# Patient Record
Sex: MALE | Race: WHITE | Employment: FULL TIME | URBAN - NONMETROPOLITAN AREA
[De-identification: names, ages, dates, MRNs, and addresses within clinical notes are randomized per-mention and may not be internally consistent; named-entity substitution may affect disease eponyms.]

---

## 2017-01-05 ENCOUNTER — TELEPHONE (OUTPATIENT)
Dept: PODIATRY | Age: 57
End: 2017-01-05

## 2017-01-06 ENCOUNTER — OFFICE VISIT (OUTPATIENT)
Dept: FAMILY MEDICINE CLINIC | Age: 57
End: 2017-01-06

## 2017-01-06 VITALS
OXYGEN SATURATION: 94 % | SYSTOLIC BLOOD PRESSURE: 110 MMHG | WEIGHT: 267 LBS | DIASTOLIC BLOOD PRESSURE: 60 MMHG | HEART RATE: 77 BPM | BODY MASS INDEX: 37.24 KG/M2

## 2017-01-06 DIAGNOSIS — D58.2 ELEVATED HEMOGLOBIN (HCC): Primary | ICD-10-CM

## 2017-01-06 DIAGNOSIS — I10 ESSENTIAL HYPERTENSION: ICD-10-CM

## 2017-01-06 DIAGNOSIS — E34.9 HYPOTESTOSTERONISM: ICD-10-CM

## 2017-01-06 DIAGNOSIS — G47.33 OSA (OBSTRUCTIVE SLEEP APNEA): ICD-10-CM

## 2017-01-06 PROCEDURE — 99214 OFFICE O/P EST MOD 30 MIN: CPT | Performed by: FAMILY MEDICINE

## 2017-01-06 RX ORDER — TESTOSTERONE GEL, 1% 10 MG/G
GEL TRANSDERMAL
Qty: 9 G | Refills: 3 | Status: SHIPPED | OUTPATIENT
Start: 2017-01-06 | End: 2017-03-30 | Stop reason: SDUPTHER

## 2017-01-09 ENCOUNTER — PROCEDURE VISIT (OUTPATIENT)
Dept: PODIATRY | Age: 57
End: 2017-01-09

## 2017-01-09 VITALS
WEIGHT: 272.2 LBS | BODY MASS INDEX: 38.11 KG/M2 | HEIGHT: 71 IN | SYSTOLIC BLOOD PRESSURE: 118 MMHG | DIASTOLIC BLOOD PRESSURE: 78 MMHG | HEART RATE: 70 BPM

## 2017-01-09 DIAGNOSIS — M20.61 DEFORMITY, TOE ACQUIRED, RIGHT: ICD-10-CM

## 2017-01-09 DIAGNOSIS — L84 CORNS AND CALLOSITIES: Primary | ICD-10-CM

## 2017-01-09 DIAGNOSIS — M79.674 PAIN IN TOE OF RIGHT FOOT: ICD-10-CM

## 2017-01-09 PROCEDURE — 99213 OFFICE O/P EST LOW 20 MIN: CPT | Performed by: PODIATRIST

## 2017-01-09 PROCEDURE — 11055 PARING/CUTG B9 HYPRKER LES 1: CPT | Performed by: PODIATRIST

## 2017-02-08 ENCOUNTER — OFFICE VISIT (OUTPATIENT)
Dept: PODIATRY | Age: 57
End: 2017-02-08

## 2017-02-08 VITALS
HEIGHT: 71 IN | BODY MASS INDEX: 38.64 KG/M2 | SYSTOLIC BLOOD PRESSURE: 132 MMHG | DIASTOLIC BLOOD PRESSURE: 74 MMHG | WEIGHT: 276 LBS | HEART RATE: 74 BPM

## 2017-02-08 DIAGNOSIS — B35.1 DERMATOPHYTOSIS OF NAIL: ICD-10-CM

## 2017-02-08 DIAGNOSIS — L84 CORNS AND CALLOSITIES: ICD-10-CM

## 2017-02-08 DIAGNOSIS — M20.41 HAMMER TOE OF RIGHT FOOT: Primary | ICD-10-CM

## 2017-02-08 PROCEDURE — 99213 OFFICE O/P EST LOW 20 MIN: CPT | Performed by: PODIATRIST

## 2017-02-23 DIAGNOSIS — M54.12 CERVICAL RADICULOPATHY: Primary | ICD-10-CM

## 2017-02-23 RX ORDER — GABAPENTIN 100 MG/1
CAPSULE ORAL
Qty: 270 CAPSULE | Refills: 1 | Status: SHIPPED | OUTPATIENT
Start: 2017-02-23 | End: 2018-02-02 | Stop reason: SINTOL

## 2017-02-28 ENCOUNTER — EVALUATION (OUTPATIENT)
Dept: PHYSICAL THERAPY | Age: 57
End: 2017-02-28

## 2017-02-28 DIAGNOSIS — G95.9 CERVICAL MYELOPATHY (HCC): Primary | ICD-10-CM

## 2017-02-28 PROCEDURE — 97110 THERAPEUTIC EXERCISES: CPT | Performed by: PHYSICAL THERAPIST

## 2017-02-28 PROCEDURE — 97014 ELECTRIC STIMULATION THERAPY: CPT | Performed by: PHYSICAL THERAPIST

## 2017-02-28 PROCEDURE — 97162 PT EVAL MOD COMPLEX 30 MIN: CPT | Performed by: PHYSICAL THERAPIST

## 2017-02-28 ASSESSMENT — PAIN DESCRIPTION - LOCATION: LOCATION: NECK;SHOULDER

## 2017-02-28 ASSESSMENT — PAIN DESCRIPTION - DESCRIPTORS: DESCRIPTORS: DULL;ACHING

## 2017-02-28 ASSESSMENT — PAIN DESCRIPTION - PROGRESSION: CLINICAL_PROGRESSION: GRADUALLY IMPROVING

## 2017-02-28 ASSESSMENT — PAIN DESCRIPTION - ORIENTATION: ORIENTATION: LEFT;RIGHT

## 2017-02-28 ASSESSMENT — PAIN DESCRIPTION - PAIN TYPE: TYPE: CHRONIC PAIN

## 2017-02-28 ASSESSMENT — PAIN DESCRIPTION - FREQUENCY: FREQUENCY: INTERMITTENT

## 2017-02-28 ASSESSMENT — PAIN SCALES - GENERAL: PAINLEVEL_OUTOF10: 3

## 2017-03-02 ENCOUNTER — TREATMENT (OUTPATIENT)
Dept: PHYSICAL THERAPY | Age: 57
End: 2017-03-02

## 2017-03-02 DIAGNOSIS — G95.9 CERVICAL MYELOPATHY (HCC): Primary | ICD-10-CM

## 2017-03-02 PROCEDURE — 97014 ELECTRIC STIMULATION THERAPY: CPT | Performed by: PHYSICAL THERAPIST

## 2017-03-02 PROCEDURE — 97012 MECHANICAL TRACTION THERAPY: CPT | Performed by: PHYSICAL THERAPIST

## 2017-03-07 ENCOUNTER — TREATMENT (OUTPATIENT)
Dept: PHYSICAL THERAPY | Age: 57
End: 2017-03-07

## 2017-03-07 DIAGNOSIS — G95.9 CERVICAL MYELOPATHY (HCC): Primary | ICD-10-CM

## 2017-03-07 PROCEDURE — 97110 THERAPEUTIC EXERCISES: CPT | Performed by: PHYSICAL THERAPIST

## 2017-03-07 PROCEDURE — 97012 MECHANICAL TRACTION THERAPY: CPT | Performed by: PHYSICAL THERAPIST

## 2017-03-07 PROCEDURE — 97014 ELECTRIC STIMULATION THERAPY: CPT | Performed by: PHYSICAL THERAPIST

## 2017-03-09 ENCOUNTER — TREATMENT (OUTPATIENT)
Dept: PHYSICAL THERAPY | Age: 57
End: 2017-03-09

## 2017-03-09 DIAGNOSIS — G95.9 CERVICAL MYELOPATHY (HCC): Primary | ICD-10-CM

## 2017-03-09 PROCEDURE — 97012 MECHANICAL TRACTION THERAPY: CPT | Performed by: PHYSICAL THERAPIST

## 2017-03-09 PROCEDURE — 97014 ELECTRIC STIMULATION THERAPY: CPT | Performed by: PHYSICAL THERAPIST

## 2017-03-09 PROCEDURE — 97110 THERAPEUTIC EXERCISES: CPT | Performed by: PHYSICAL THERAPIST

## 2017-03-14 ENCOUNTER — TREATMENT (OUTPATIENT)
Dept: PHYSICAL THERAPY | Age: 57
End: 2017-03-14

## 2017-03-14 DIAGNOSIS — G95.9 CERVICAL MYELOPATHY (HCC): Primary | ICD-10-CM

## 2017-03-16 ENCOUNTER — TREATMENT (OUTPATIENT)
Dept: PHYSICAL THERAPY | Age: 57
End: 2017-03-16
Payer: COMMERCIAL

## 2017-03-16 DIAGNOSIS — G95.9 CERVICAL MYELOPATHY (HCC): Primary | ICD-10-CM

## 2017-03-16 PROCEDURE — 97110 THERAPEUTIC EXERCISES: CPT | Performed by: PHYSICAL THERAPIST

## 2017-03-16 PROCEDURE — 97012 MECHANICAL TRACTION THERAPY: CPT | Performed by: PHYSICAL THERAPIST

## 2017-03-21 ENCOUNTER — TREATMENT (OUTPATIENT)
Dept: PHYSICAL THERAPY | Age: 57
End: 2017-03-21
Payer: COMMERCIAL

## 2017-03-21 DIAGNOSIS — G95.9 CERVICAL MYELOPATHY (HCC): Primary | ICD-10-CM

## 2017-03-21 PROCEDURE — 97110 THERAPEUTIC EXERCISES: CPT | Performed by: PHYSICAL THERAPIST

## 2017-03-30 DIAGNOSIS — E34.9 HYPOTESTOSTERONISM: ICD-10-CM

## 2017-03-31 ENCOUNTER — OFFICE VISIT (OUTPATIENT)
Dept: FAMILY MEDICINE CLINIC | Age: 57
End: 2017-03-31
Payer: COMMERCIAL

## 2017-03-31 VITALS
SYSTOLIC BLOOD PRESSURE: 136 MMHG | RESPIRATION RATE: 16 BRPM | DIASTOLIC BLOOD PRESSURE: 84 MMHG | OXYGEN SATURATION: 94 % | BODY MASS INDEX: 38.08 KG/M2 | HEART RATE: 74 BPM | HEIGHT: 71 IN | WEIGHT: 272 LBS

## 2017-03-31 DIAGNOSIS — H91.93 HEARING DECREASED, BILATERAL: Primary | ICD-10-CM

## 2017-03-31 DIAGNOSIS — G47.33 OSA (OBSTRUCTIVE SLEEP APNEA): ICD-10-CM

## 2017-03-31 PROCEDURE — 99214 OFFICE O/P EST MOD 30 MIN: CPT | Performed by: NURSE PRACTITIONER

## 2017-03-31 RX ORDER — TESTOSTERONE GEL, 1% 10 MG/G
GEL TRANSDERMAL
Qty: 9 G | Refills: 1 | Status: SHIPPED | OUTPATIENT
Start: 2017-03-31 | End: 2017-10-20 | Stop reason: SDUPTHER

## 2017-03-31 ASSESSMENT — ENCOUNTER SYMPTOMS
VOMITING: 0
ALLERGIC/IMMUNOLOGIC NEGATIVE: 1
DIARRHEA: 0
RESPIRATORY NEGATIVE: 1
ABDOMINAL PAIN: 0
SHORTNESS OF BREATH: 0
EYES NEGATIVE: 1
NAUSEA: 0
SINUS PRESSURE: 0
CHEST TIGHTNESS: 0
GASTROINTESTINAL NEGATIVE: 1
CONSTIPATION: 0
COUGH: 0
TROUBLE SWALLOWING: 0

## 2017-04-06 ENCOUNTER — OFFICE VISIT (OUTPATIENT)
Dept: NEUROSURGERY | Age: 57
End: 2017-04-06
Payer: COMMERCIAL

## 2017-04-06 VITALS
HEART RATE: 75 BPM | SYSTOLIC BLOOD PRESSURE: 120 MMHG | DIASTOLIC BLOOD PRESSURE: 76 MMHG | BODY MASS INDEX: 37.94 KG/M2 | HEIGHT: 71 IN | WEIGHT: 271 LBS

## 2017-04-06 DIAGNOSIS — G95.9 CERVICAL MYELOPATHY (HCC): Primary | ICD-10-CM

## 2017-04-06 PROCEDURE — 99214 OFFICE O/P EST MOD 30 MIN: CPT | Performed by: NEUROLOGICAL SURGERY

## 2017-04-06 ASSESSMENT — VISUAL ACUITY: VA_NORMAL: 1

## 2017-04-19 ENCOUNTER — TELEPHONE (OUTPATIENT)
Dept: NEUROSURGERY | Age: 57
End: 2017-04-19

## 2017-04-21 DIAGNOSIS — Z01.818 PRE-OP EXAM: Primary | ICD-10-CM

## 2017-04-21 DIAGNOSIS — Z01.818 PRE-OP EXAM: ICD-10-CM

## 2017-04-21 PROCEDURE — 93000 ELECTROCARDIOGRAM COMPLETE: CPT | Performed by: INTERNAL MEDICINE

## 2017-04-26 LAB
-: NORMAL
ABSOLUTE EOS #: 0.2 K/UL (ref 0–0.4)
ABSOLUTE LYMPH #: 2 K/UL (ref 1–4.8)
ABSOLUTE MONO #: 0.6 K/UL (ref 0.1–1.2)
AMORPHOUS: NORMAL
ANION GAP SERPL CALCULATED.3IONS-SCNC: 16 MMOL/L (ref 9–17)
BACTERIA: NORMAL
BASOPHILS # BLD: 1 %
BASOPHILS ABSOLUTE: 0.1 K/UL (ref 0–0.2)
BILIRUBIN URINE: NEGATIVE
BUN BLDV-MCNC: 14 MG/DL (ref 6–20)
BUN/CREAT BLD: 18 (ref 9–20)
CALCIUM SERPL-MCNC: 9.5 MG/DL (ref 8.6–10.4)
CASTS UA: NORMAL /LPF (ref 0–2)
CHLORIDE BLD-SCNC: 98 MMOL/L (ref 98–107)
CO2: 26 MMOL/L (ref 20–31)
COLLAGEN ADENOSINE-5'-DIPHOSPHATE (ADP) TIME: 102 SEC (ref 67–112)
COLLAGEN EPINEPHRINE TIME: >300 SEC (ref 85–172)
COLOR: NORMAL
COMMENT UA: NORMAL
CREAT SERPL-MCNC: 0.77 MG/DL (ref 0.7–1.2)
CRYSTALS, UA: NORMAL /HPF
DIFFERENTIAL TYPE: NORMAL
EOSINOPHILS RELATIVE PERCENT: 3 %
EPITHELIAL CELLS UA: NORMAL /HPF (ref 0–5)
GFR AFRICAN AMERICAN: >60 ML/MIN
GFR NON-AFRICAN AMERICAN: >60 ML/MIN
GFR SERPL CREATININE-BSD FRML MDRD: ABNORMAL ML/MIN/{1.73_M2}
GFR SERPL CREATININE-BSD FRML MDRD: ABNORMAL ML/MIN/{1.73_M2}
GLUCOSE BLD-MCNC: 122 MG/DL (ref 70–99)
GLUCOSE URINE: NEGATIVE
HCT VFR BLD CALC: 47.4 % (ref 41–53)
HEMOGLOBIN: 15.9 G/DL (ref 13.5–17.5)
INR BLD: 1
KETONES, URINE: NEGATIVE
LEUKOCYTE ESTERASE, URINE: NEGATIVE
LYMPHOCYTES # BLD: 28 %
MCH RBC QN AUTO: 28.8 PG (ref 26–34)
MCHC RBC AUTO-ENTMCNC: 33.6 G/DL (ref 31–37)
MCV RBC AUTO: 85.8 FL (ref 80–100)
MONOCYTES # BLD: 8 %
MUCUS: NORMAL
NITRITE, URINE: NEGATIVE
OTHER OBSERVATIONS UA: NORMAL
PARTIAL THROMBOPLASTIN TIME: 28.4 SEC (ref 27–35)
PDW BLD-RTO: 14.1 % (ref 11–14.5)
PH UA: 6 (ref 5–6)
PLATELET # BLD: 212 K/UL (ref 140–450)
PLATELET ESTIMATE: NORMAL
PLATELET FUNCTION INTERP: ABNORMAL
PMV BLD AUTO: 6.9 FL (ref 6–12)
POTASSIUM SERPL-SCNC: 4 MMOL/L (ref 3.7–5.3)
PROTEIN UA: NEGATIVE
PROTHROMBIN TIME: 10.7 SEC (ref 9.4–11.3)
RBC # BLD: 5.52 M/UL (ref 4.5–5.9)
RBC # BLD: NORMAL 10*6/UL
RBC UA: NORMAL /HPF (ref 0–4)
RENAL EPITHELIAL, UA: NORMAL /HPF
SEG NEUTROPHILS: 60 %
SEGMENTED NEUTROPHILS ABSOLUTE COUNT: 4.2 K/UL (ref 1.8–7.7)
SODIUM BLD-SCNC: 140 MMOL/L (ref 135–144)
SPECIFIC GRAVITY UA: 1.01 (ref 1.01–1.02)
TRICHOMONAS: NORMAL
TURBIDITY: NORMAL
URINE HGB: NEGATIVE
UROBILINOGEN, URINE: NORMAL
WBC # BLD: 7 K/UL (ref 3.5–11)
WBC # BLD: NORMAL 10*3/UL
WBC UA: NORMAL /HPF (ref 0–4)
YEAST: NORMAL

## 2017-05-08 ENCOUNTER — ANESTHESIA EVENT (OUTPATIENT)
Dept: OPERATING ROOM | Age: 57
DRG: 473 | End: 2017-05-08
Payer: COMMERCIAL

## 2017-05-09 ENCOUNTER — APPOINTMENT (OUTPATIENT)
Dept: GENERAL RADIOLOGY | Age: 57
DRG: 473 | End: 2017-05-09
Attending: NEUROLOGICAL SURGERY
Payer: COMMERCIAL

## 2017-05-09 ENCOUNTER — ANESTHESIA (OUTPATIENT)
Dept: OPERATING ROOM | Age: 57
DRG: 473 | End: 2017-05-09
Payer: COMMERCIAL

## 2017-05-09 ENCOUNTER — HOSPITAL ENCOUNTER (INPATIENT)
Age: 57
LOS: 2 days | Discharge: HOME | DRG: 473 | End: 2017-05-11
Attending: NEUROLOGICAL SURGERY | Admitting: NEUROLOGICAL SURGERY
Payer: COMMERCIAL

## 2017-05-09 VITALS — DIASTOLIC BLOOD PRESSURE: 74 MMHG | SYSTOLIC BLOOD PRESSURE: 148 MMHG | TEMPERATURE: 97.9 F | OXYGEN SATURATION: 98 %

## 2017-05-09 LAB
GLUCOSE BLD-MCNC: 100 MG/DL (ref 75–110)
GLUCOSE BLD-MCNC: 106 MG/DL (ref 75–110)
GLUCOSE BLD-MCNC: 117 MG/DL (ref 75–110)
POC POTASSIUM: 4.2 MMOL/L (ref 3.5–5.1)

## 2017-05-09 PROCEDURE — C1729 CATH, DRAINAGE: HCPCS | Performed by: NEUROLOGICAL SURGERY

## 2017-05-09 PROCEDURE — 3600000005 HC SURGERY LEVEL 5 BASE: Performed by: NEUROLOGICAL SURGERY

## 2017-05-09 PROCEDURE — L8699 PROSTHETIC IMPLANT NOS: HCPCS | Performed by: NEUROLOGICAL SURGERY

## 2017-05-09 PROCEDURE — 7100000000 HC PACU RECOVERY - FIRST 15 MIN: Performed by: NEUROLOGICAL SURGERY

## 2017-05-09 PROCEDURE — 6370000000 HC RX 637 (ALT 250 FOR IP): Performed by: NEUROLOGICAL SURGERY

## 2017-05-09 PROCEDURE — 3700000001 HC ADD 15 MINUTES (ANESTHESIA): Performed by: NEUROLOGICAL SURGERY

## 2017-05-09 PROCEDURE — 2580000003 HC RX 258: Performed by: ANESTHESIOLOGY

## 2017-05-09 PROCEDURE — 0RT30ZZ RESECTION OF CERVICAL VERTEBRAL DISC, OPEN APPROACH: ICD-10-PCS | Performed by: NEUROLOGICAL SURGERY

## 2017-05-09 PROCEDURE — 6360000002 HC RX W HCPCS: Performed by: ANESTHESIOLOGY

## 2017-05-09 PROCEDURE — 72040 X-RAY EXAM NECK SPINE 2-3 VW: CPT

## 2017-05-09 PROCEDURE — 2500000003 HC RX 250 WO HCPCS: Performed by: SPECIALIST

## 2017-05-09 PROCEDURE — 22552 ARTHRD ANT NTRBD CERVICAL EA: CPT | Performed by: NEUROLOGICAL SURGERY

## 2017-05-09 PROCEDURE — 2720000010 HC SURG SUPPLY STERILE: Performed by: NEUROLOGICAL SURGERY

## 2017-05-09 PROCEDURE — 7100000001 HC PACU RECOVERY - ADDTL 15 MIN: Performed by: NEUROLOGICAL SURGERY

## 2017-05-09 PROCEDURE — 1200000000 HC SEMI PRIVATE

## 2017-05-09 PROCEDURE — 2580000003 HC RX 258: Performed by: NEUROLOGICAL SURGERY

## 2017-05-09 PROCEDURE — C1713 ANCHOR/SCREW BN/BN,TIS/BN: HCPCS | Performed by: NEUROLOGICAL SURGERY

## 2017-05-09 PROCEDURE — 6370000000 HC RX 637 (ALT 250 FOR IP): Performed by: SPECIALIST

## 2017-05-09 PROCEDURE — 3700000000 HC ANESTHESIA ATTENDED CARE: Performed by: NEUROLOGICAL SURGERY

## 2017-05-09 PROCEDURE — 6360000002 HC RX W HCPCS: Performed by: SPECIALIST

## 2017-05-09 PROCEDURE — 2500000003 HC RX 250 WO HCPCS: Performed by: NEUROLOGICAL SURGERY

## 2017-05-09 PROCEDURE — 84132 ASSAY OF SERUM POTASSIUM: CPT

## 2017-05-09 PROCEDURE — 2580000003 HC RX 258: Performed by: SPECIALIST

## 2017-05-09 PROCEDURE — 3600000015 HC SURGERY LEVEL 5 ADDTL 15MIN: Performed by: NEUROLOGICAL SURGERY

## 2017-05-09 PROCEDURE — 6360000002 HC RX W HCPCS: Performed by: NEUROLOGICAL SURGERY

## 2017-05-09 PROCEDURE — 0RG20A0 FUSION OF 2 OR MORE CERVICAL VERTEBRAL JOINTS WITH INTERBODY FUSION DEVICE, ANTERIOR APPROACH, ANTERIOR COLUMN, OPEN APPROACH: ICD-10-PCS | Performed by: NEUROLOGICAL SURGERY

## 2017-05-09 PROCEDURE — 2500000003 HC RX 250 WO HCPCS: Performed by: ANESTHESIOLOGY

## 2017-05-09 PROCEDURE — 82947 ASSAY GLUCOSE BLOOD QUANT: CPT

## 2017-05-09 PROCEDURE — 87086 URINE CULTURE/COLONY COUNT: CPT

## 2017-05-09 PROCEDURE — 22551 ARTHRD ANT NTRBDY CERVICAL: CPT | Performed by: NEUROLOGICAL SURGERY

## 2017-05-09 PROCEDURE — 22853 INSJ BIOMECHANICAL DEVICE: CPT | Performed by: NEUROLOGICAL SURGERY

## 2017-05-09 DEVICE — IMPLANTABLE DEVICE: Type: IMPLANTABLE DEVICE | Site: SPINE CERVICAL | Status: FUNCTIONAL

## 2017-05-09 RX ORDER — CETIRIZINE HYDROCHLORIDE 10 MG/1
10 TABLET ORAL DAILY
Status: DISCONTINUED | OUTPATIENT
Start: 2017-05-09 | End: 2017-05-11 | Stop reason: HOSPADM

## 2017-05-09 RX ORDER — ACETAMINOPHEN 325 MG/1
650 TABLET ORAL EVERY 4 HOURS PRN
Status: DISCONTINUED | OUTPATIENT
Start: 2017-05-09 | End: 2017-05-11 | Stop reason: HOSPADM

## 2017-05-09 RX ORDER — ONDANSETRON 2 MG/ML
4 INJECTION INTRAMUSCULAR; INTRAVENOUS
Status: DISCONTINUED | OUTPATIENT
Start: 2017-05-09 | End: 2017-05-09 | Stop reason: HOSPADM

## 2017-05-09 RX ORDER — SODIUM CHLORIDE 9 MG/ML
INJECTION, SOLUTION INTRAVENOUS CONTINUOUS
Status: DISCONTINUED | OUTPATIENT
Start: 2017-05-09 | End: 2017-05-11

## 2017-05-09 RX ORDER — LISINOPRIL 20 MG/1
20 TABLET ORAL DAILY
Status: DISCONTINUED | OUTPATIENT
Start: 2017-05-09 | End: 2017-05-11 | Stop reason: HOSPADM

## 2017-05-09 RX ORDER — FAMOTIDINE 20 MG/1
20 TABLET, FILM COATED ORAL 2 TIMES DAILY
Status: DISCONTINUED | OUTPATIENT
Start: 2017-05-09 | End: 2017-05-11 | Stop reason: HOSPADM

## 2017-05-09 RX ORDER — SUCCINYLCHOLINE CHLORIDE 20 MG/ML
INJECTION INTRAMUSCULAR; INTRAVENOUS PRN
Status: DISCONTINUED | OUTPATIENT
Start: 2017-05-09 | End: 2017-05-09 | Stop reason: SDUPTHER

## 2017-05-09 RX ORDER — ONDANSETRON 2 MG/ML
4 INJECTION INTRAMUSCULAR; INTRAVENOUS EVERY 6 HOURS PRN
Status: DISCONTINUED | OUTPATIENT
Start: 2017-05-09 | End: 2017-05-11 | Stop reason: HOSPADM

## 2017-05-09 RX ORDER — LISINOPRIL AND HYDROCHLOROTHIAZIDE 25; 20 MG/1; MG/1
1 TABLET ORAL DAILY
Status: DISCONTINUED | OUTPATIENT
Start: 2017-05-09 | End: 2017-05-09 | Stop reason: CLARIF

## 2017-05-09 RX ORDER — NICOTINE POLACRILEX 4 MG
15 LOZENGE BUCCAL PRN
Status: DISCONTINUED | OUTPATIENT
Start: 2017-05-09 | End: 2017-05-11 | Stop reason: HOSPADM

## 2017-05-09 RX ORDER — GINSENG 100 MG
CAPSULE ORAL PRN
Status: DISCONTINUED | OUTPATIENT
Start: 2017-05-09 | End: 2017-05-09 | Stop reason: HOSPADM

## 2017-05-09 RX ORDER — FENTANYL CITRATE 50 UG/ML
50 INJECTION, SOLUTION INTRAMUSCULAR; INTRAVENOUS EVERY 5 MIN PRN
Status: COMPLETED | OUTPATIENT
Start: 2017-05-09 | End: 2017-05-09

## 2017-05-09 RX ORDER — MIDAZOLAM HYDROCHLORIDE 1 MG/ML
1 INJECTION INTRAMUSCULAR; INTRAVENOUS EVERY 5 MIN PRN
Status: DISCONTINUED | OUTPATIENT
Start: 2017-05-09 | End: 2017-05-11 | Stop reason: HOSPADM

## 2017-05-09 RX ORDER — SODIUM CHLORIDE 0.9 % (FLUSH) 0.9 %
10 SYRINGE (ML) INJECTION EVERY 12 HOURS SCHEDULED
Status: DISCONTINUED | OUTPATIENT
Start: 2017-05-09 | End: 2017-05-11 | Stop reason: HOSPADM

## 2017-05-09 RX ORDER — M-VIT,TX,IRON,MINS/CALC/FOLIC 27MG-0.4MG
1 TABLET ORAL DAILY
Status: DISCONTINUED | OUTPATIENT
Start: 2017-05-09 | End: 2017-05-11 | Stop reason: HOSPADM

## 2017-05-09 RX ORDER — ALBUTEROL SULFATE 90 UG/1
AEROSOL, METERED RESPIRATORY (INHALATION) PRN
Status: DISCONTINUED | OUTPATIENT
Start: 2017-05-09 | End: 2017-05-09 | Stop reason: SDUPTHER

## 2017-05-09 RX ORDER — LIDOCAINE HYDROCHLORIDE AND EPINEPHRINE 10; 10 MG/ML; UG/ML
INJECTION, SOLUTION INFILTRATION; PERINEURAL PRN
Status: DISCONTINUED | OUTPATIENT
Start: 2017-05-09 | End: 2017-05-09 | Stop reason: HOSPADM

## 2017-05-09 RX ORDER — TESTOSTERONE GEL, 1% 10 MG/G
5 GEL TRANSDERMAL DAILY
Status: DISCONTINUED | OUTPATIENT
Start: 2017-05-09 | End: 2017-05-09 | Stop reason: RX

## 2017-05-09 RX ORDER — OXYCODONE HYDROCHLORIDE AND ACETAMINOPHEN 5; 325 MG/1; MG/1
2 TABLET ORAL EVERY 4 HOURS PRN
Status: DISCONTINUED | OUTPATIENT
Start: 2017-05-09 | End: 2017-05-11 | Stop reason: HOSPADM

## 2017-05-09 RX ORDER — DOCUSATE SODIUM 100 MG/1
100 CAPSULE, LIQUID FILLED ORAL 2 TIMES DAILY
Status: DISCONTINUED | OUTPATIENT
Start: 2017-05-09 | End: 2017-05-11 | Stop reason: HOSPADM

## 2017-05-09 RX ORDER — OXYCODONE HYDROCHLORIDE AND ACETAMINOPHEN 5; 325 MG/1; MG/1
1 TABLET ORAL EVERY 4 HOURS PRN
Status: DISCONTINUED | OUTPATIENT
Start: 2017-05-09 | End: 2017-05-11 | Stop reason: HOSPADM

## 2017-05-09 RX ORDER — DEXTROSE MONOHYDRATE 25 G/50ML
12.5 INJECTION, SOLUTION INTRAVENOUS PRN
Status: DISCONTINUED | OUTPATIENT
Start: 2017-05-09 | End: 2017-05-11 | Stop reason: HOSPADM

## 2017-05-09 RX ORDER — DIPHENHYDRAMINE HYDROCHLORIDE 50 MG/ML
12.5 INJECTION INTRAMUSCULAR; INTRAVENOUS
Status: DISCONTINUED | OUTPATIENT
Start: 2017-05-09 | End: 2017-05-09 | Stop reason: HOSPADM

## 2017-05-09 RX ORDER — EPHEDRINE SULFATE 50 MG/ML
INJECTION, SOLUTION INTRAVENOUS PRN
Status: DISCONTINUED | OUTPATIENT
Start: 2017-05-09 | End: 2017-05-09 | Stop reason: SDUPTHER

## 2017-05-09 RX ORDER — SODIUM CHLORIDE 0.9 % (FLUSH) 0.9 %
10 SYRINGE (ML) INJECTION PRN
Status: DISCONTINUED | OUTPATIENT
Start: 2017-05-09 | End: 2017-05-11 | Stop reason: HOSPADM

## 2017-05-09 RX ORDER — DEXTROSE MONOHYDRATE 50 MG/ML
100 INJECTION, SOLUTION INTRAVENOUS PRN
Status: DISCONTINUED | OUTPATIENT
Start: 2017-05-09 | End: 2017-05-11 | Stop reason: HOSPADM

## 2017-05-09 RX ORDER — PROPOFOL 10 MG/ML
INJECTION, EMULSION INTRAVENOUS PRN
Status: DISCONTINUED | OUTPATIENT
Start: 2017-05-09 | End: 2017-05-09 | Stop reason: SDUPTHER

## 2017-05-09 RX ORDER — MAGNESIUM HYDROXIDE 1200 MG/15ML
LIQUID ORAL CONTINUOUS PRN
Status: DISCONTINUED | OUTPATIENT
Start: 2017-05-09 | End: 2017-05-09 | Stop reason: HOSPADM

## 2017-05-09 RX ORDER — CYCLOBENZAPRINE HCL 10 MG
10 TABLET ORAL 3 TIMES DAILY
Status: DISCONTINUED | OUTPATIENT
Start: 2017-05-09 | End: 2017-05-11 | Stop reason: HOSPADM

## 2017-05-09 RX ORDER — GABAPENTIN 100 MG/1
100 CAPSULE ORAL 3 TIMES DAILY
Status: DISCONTINUED | OUTPATIENT
Start: 2017-05-09 | End: 2017-05-11 | Stop reason: HOSPADM

## 2017-05-09 RX ORDER — HYDROCHLOROTHIAZIDE 25 MG/1
25 TABLET ORAL DAILY
Status: DISCONTINUED | OUTPATIENT
Start: 2017-05-09 | End: 2017-05-11 | Stop reason: HOSPADM

## 2017-05-09 RX ORDER — LIDOCAINE HYDROCHLORIDE 10 MG/ML
1 INJECTION, SOLUTION EPIDURAL; INFILTRATION; INTRACAUDAL; PERINEURAL
Status: COMPLETED | OUTPATIENT
Start: 2017-05-09 | End: 2017-05-09

## 2017-05-09 RX ORDER — MIDAZOLAM HYDROCHLORIDE 1 MG/ML
INJECTION INTRAMUSCULAR; INTRAVENOUS PRN
Status: DISCONTINUED | OUTPATIENT
Start: 2017-05-09 | End: 2017-05-09 | Stop reason: SDUPTHER

## 2017-05-09 RX ORDER — ALBUTEROL SULFATE 90 UG/1
2 AEROSOL, METERED RESPIRATORY (INHALATION) EVERY 4 HOURS PRN
Status: DISCONTINUED | OUTPATIENT
Start: 2017-05-09 | End: 2017-05-11 | Stop reason: HOSPADM

## 2017-05-09 RX ORDER — FENTANYL CITRATE 50 UG/ML
25 INJECTION, SOLUTION INTRAMUSCULAR; INTRAVENOUS EVERY 5 MIN PRN
Status: DISCONTINUED | OUTPATIENT
Start: 2017-05-09 | End: 2017-05-09 | Stop reason: HOSPADM

## 2017-05-09 RX ORDER — SODIUM CHLORIDE, SODIUM LACTATE, POTASSIUM CHLORIDE, CALCIUM CHLORIDE 600; 310; 30; 20 MG/100ML; MG/100ML; MG/100ML; MG/100ML
INJECTION, SOLUTION INTRAVENOUS CONTINUOUS
Status: DISCONTINUED | OUTPATIENT
Start: 2017-05-09 | End: 2017-05-09

## 2017-05-09 RX ORDER — PROPOFOL 10 MG/ML
INJECTION, EMULSION INTRAVENOUS CONTINUOUS PRN
Status: DISCONTINUED | OUTPATIENT
Start: 2017-05-09 | End: 2017-05-09 | Stop reason: SDUPTHER

## 2017-05-09 RX ORDER — KETAMINE HYDROCHLORIDE 10 MG/ML
INJECTION, SOLUTION INTRAMUSCULAR; INTRAVENOUS PRN
Status: DISCONTINUED | OUTPATIENT
Start: 2017-05-09 | End: 2017-05-09 | Stop reason: SDUPTHER

## 2017-05-09 RX ADMIN — MIDAZOLAM HYDROCHLORIDE 2 MG: 1 INJECTION, SOLUTION INTRAMUSCULAR; INTRAVENOUS at 07:28

## 2017-05-09 RX ADMIN — Medication 3 G: at 07:40

## 2017-05-09 RX ADMIN — PHENYLEPHRINE HYDROCHLORIDE 100 MCG/MIN: 10 INJECTION INTRAMUSCULAR; INTRAVENOUS; SUBCUTANEOUS at 07:50

## 2017-05-09 RX ADMIN — SUCCINYLCHOLINE CHLORIDE 140 MG: 20 INJECTION, SOLUTION INTRAMUSCULAR; INTRAVENOUS at 07:32

## 2017-05-09 RX ADMIN — HYDROMORPHONE HYDROCHLORIDE 0.5 MG: 1 INJECTION, SOLUTION INTRAMUSCULAR; INTRAVENOUS; SUBCUTANEOUS at 13:10

## 2017-05-09 RX ADMIN — LIDOCAINE HYDROCHLORIDE 50 MG: 10 INJECTION, SOLUTION EPIDURAL; INFILTRATION; INTRACAUDAL; PERINEURAL at 07:32

## 2017-05-09 RX ADMIN — HYDROMORPHONE HYDROCHLORIDE 0.5 MG: 1 INJECTION, SOLUTION INTRAMUSCULAR; INTRAVENOUS; SUBCUTANEOUS at 12:10

## 2017-05-09 RX ADMIN — GABAPENTIN 100 MG: 100 CAPSULE ORAL at 20:42

## 2017-05-09 RX ADMIN — CYCLOBENZAPRINE 10 MG: 10 TABLET, FILM COATED ORAL at 17:42

## 2017-05-09 RX ADMIN — OXYCODONE HYDROCHLORIDE AND ACETAMINOPHEN 2 TABLET: 5; 325 TABLET ORAL at 16:29

## 2017-05-09 RX ADMIN — REMIFENTANIL HYDROCHLORIDE 0.1 MCG/KG/MIN: 1 INJECTION, POWDER, LYOPHILIZED, FOR SOLUTION INTRAVENOUS at 07:35

## 2017-05-09 RX ADMIN — Medication 3 G: at 10:40

## 2017-05-09 RX ADMIN — PROPOFOL 150 MG: 10 INJECTION, EMULSION INTRAVENOUS at 08:25

## 2017-05-09 RX ADMIN — SODIUM CHLORIDE, POTASSIUM CHLORIDE, SODIUM LACTATE AND CALCIUM CHLORIDE: 600; 310; 30; 20 INJECTION, SOLUTION INTRAVENOUS at 15:17

## 2017-05-09 RX ADMIN — FENTANYL CITRATE 100 MCG: 50 INJECTION, SOLUTION INTRAMUSCULAR; INTRAVENOUS at 07:32

## 2017-05-09 RX ADMIN — SODIUM CHLORIDE: 9 INJECTION, SOLUTION INTRAVENOUS at 15:44

## 2017-05-09 RX ADMIN — PROPOFOL 50 MCG/KG/MIN: 10 INJECTION, EMULSION INTRAVENOUS at 07:35

## 2017-05-09 RX ADMIN — DEXTROSE MONOHYDRATE 3 G: 50 INJECTION, SOLUTION INTRAVENOUS at 16:44

## 2017-05-09 RX ADMIN — OXYCODONE HYDROCHLORIDE AND ACETAMINOPHEN 2 TABLET: 5; 325 TABLET ORAL at 21:05

## 2017-05-09 RX ADMIN — SODIUM CHLORIDE, POTASSIUM CHLORIDE, SODIUM LACTATE AND CALCIUM CHLORIDE: 600; 310; 30; 20 INJECTION, SOLUTION INTRAVENOUS at 06:52

## 2017-05-09 RX ADMIN — MIDAZOLAM HYDROCHLORIDE 1 MG: 1 INJECTION, SOLUTION INTRAMUSCULAR; INTRAVENOUS at 10:13

## 2017-05-09 RX ADMIN — DOCUSATE SODIUM 100 MG: 100 CAPSULE ORAL at 20:42

## 2017-05-09 RX ADMIN — ALBUTEROL SULFATE 6 PUFF: 90 AEROSOL, METERED RESPIRATORY (INHALATION) at 08:56

## 2017-05-09 RX ADMIN — EPHEDRINE SULFATE 10 MG: 50 INJECTION, SOLUTION INTRAMUSCULAR; INTRAVENOUS; SUBCUTANEOUS at 08:59

## 2017-05-09 RX ADMIN — PHENYLEPHRINE HYDROCHLORIDE 200 MCG: 10 INJECTION INTRAMUSCULAR; INTRAVENOUS; SUBCUTANEOUS at 07:50

## 2017-05-09 RX ADMIN — CYCLOBENZAPRINE 10 MG: 10 TABLET, FILM COATED ORAL at 20:42

## 2017-05-09 RX ADMIN — FENTANYL CITRATE 50 MCG: 50 INJECTION INTRAMUSCULAR; INTRAVENOUS at 11:44

## 2017-05-09 RX ADMIN — FENTANYL CITRATE 50 MCG: 50 INJECTION INTRAMUSCULAR; INTRAVENOUS at 12:05

## 2017-05-09 RX ADMIN — KETAMINE HYDROCHLORIDE 30 MG: 10 INJECTION INTRAMUSCULAR; INTRAVENOUS at 08:55

## 2017-05-09 RX ADMIN — FENTANYL CITRATE 50 MCG: 50 INJECTION INTRAMUSCULAR; INTRAVENOUS at 12:00

## 2017-05-09 RX ADMIN — Medication 10 ML: at 20:42

## 2017-05-09 RX ADMIN — FAMOTIDINE 20 MG: 20 TABLET, FILM COATED ORAL at 21:02

## 2017-05-09 RX ADMIN — EPHEDRINE SULFATE 10 MG: 50 INJECTION, SOLUTION INTRAMUSCULAR; INTRAVENOUS; SUBCUTANEOUS at 09:30

## 2017-05-09 RX ADMIN — EPHEDRINE SULFATE 15 MG: 50 INJECTION, SOLUTION INTRAMUSCULAR; INTRAVENOUS; SUBCUTANEOUS at 07:55

## 2017-05-09 RX ADMIN — FENTANYL CITRATE 50 MCG: 50 INJECTION INTRAMUSCULAR; INTRAVENOUS at 11:50

## 2017-05-09 RX ADMIN — MULTIPLE VITAMINS W/ MINERALS TAB 1 TABLET: TAB at 17:43

## 2017-05-09 RX ADMIN — PROPOFOL 200 MG: 10 INJECTION, EMULSION INTRAVENOUS at 07:32

## 2017-05-09 ASSESSMENT — PAIN SCALES - GENERAL
PAINLEVEL_OUTOF10: 6
PAINLEVEL_OUTOF10: 3
PAINLEVEL_OUTOF10: 5
PAINLEVEL_OUTOF10: 4
PAINLEVEL_OUTOF10: 8
PAINLEVEL_OUTOF10: 0
PAINLEVEL_OUTOF10: 7
PAINLEVEL_OUTOF10: 7
PAINLEVEL_OUTOF10: 5
PAINLEVEL_OUTOF10: 3
PAINLEVEL_OUTOF10: 7
PAINLEVEL_OUTOF10: 3
PAINLEVEL_OUTOF10: 7
PAINLEVEL_OUTOF10: 3
PAINLEVEL_OUTOF10: 4
PAINLEVEL_OUTOF10: 0
PAINLEVEL_OUTOF10: 7
PAINLEVEL_OUTOF10: 3
PAINLEVEL_OUTOF10: 5

## 2017-05-09 ASSESSMENT — PAIN DESCRIPTION - PROGRESSION
CLINICAL_PROGRESSION: GRADUALLY WORSENING
CLINICAL_PROGRESSION: GRADUALLY WORSENING
CLINICAL_PROGRESSION: NOT CHANGED
CLINICAL_PROGRESSION: GRADUALLY WORSENING
CLINICAL_PROGRESSION: NOT CHANGED
CLINICAL_PROGRESSION: GRADUALLY IMPROVING
CLINICAL_PROGRESSION: NOT CHANGED
CLINICAL_PROGRESSION: NOT CHANGED
CLINICAL_PROGRESSION: GRADUALLY IMPROVING
CLINICAL_PROGRESSION: RAPIDLY WORSENING
CLINICAL_PROGRESSION: NOT CHANGED
CLINICAL_PROGRESSION: GRADUALLY IMPROVING

## 2017-05-09 ASSESSMENT — PAIN DESCRIPTION - LOCATION
LOCATION: NECK
LOCATION: NECK

## 2017-05-09 ASSESSMENT — PAIN DESCRIPTION - ORIENTATION: ORIENTATION: POSTERIOR

## 2017-05-09 ASSESSMENT — ENCOUNTER SYMPTOMS
STRIDOR: 0
SHORTNESS OF BREATH: 0

## 2017-05-09 ASSESSMENT — PAIN DESCRIPTION - DESCRIPTORS
DESCRIPTORS: DISCOMFORT
DESCRIPTORS: PRESSURE

## 2017-05-09 ASSESSMENT — PAIN DESCRIPTION - PAIN TYPE
TYPE: SURGICAL PAIN
TYPE: SURGICAL PAIN

## 2017-05-09 ASSESSMENT — PAIN DESCRIPTION - ONSET
ONSET: GRADUAL
ONSET: ON-GOING

## 2017-05-09 ASSESSMENT — PAIN DESCRIPTION - FREQUENCY: FREQUENCY: CONTINUOUS

## 2017-05-10 ENCOUNTER — APPOINTMENT (OUTPATIENT)
Dept: CT IMAGING | Age: 57
DRG: 473 | End: 2017-05-10
Attending: NEUROLOGICAL SURGERY
Payer: COMMERCIAL

## 2017-05-10 PROBLEM — R73.03 PREDIABETES: Chronic | Status: ACTIVE | Noted: 2017-05-10

## 2017-05-10 PROBLEM — G47.33 SLEEP APNEA, OBSTRUCTIVE: Chronic | Status: ACTIVE | Noted: 2017-05-10

## 2017-05-10 LAB
ABSOLUTE EOS #: 0.1 K/UL (ref 0–0.4)
ABSOLUTE LYMPH #: 1.6 K/UL (ref 1–4.8)
ABSOLUTE MONO #: 0.7 K/UL (ref 0.1–1.2)
ANION GAP SERPL CALCULATED.3IONS-SCNC: 11 MMOL/L (ref 9–17)
BASOPHILS # BLD: 1 %
BASOPHILS ABSOLUTE: 0.1 K/UL (ref 0–0.2)
BUN BLDV-MCNC: 11 MG/DL (ref 6–20)
BUN/CREAT BLD: ABNORMAL (ref 9–20)
CALCIUM SERPL-MCNC: 8.7 MG/DL (ref 8.6–10.4)
CHLORIDE BLD-SCNC: 98 MMOL/L (ref 98–107)
CO2: 27 MMOL/L (ref 20–31)
CREAT SERPL-MCNC: 0.78 MG/DL (ref 0.7–1.2)
CULTURE: NO GROWTH
CULTURE: NORMAL
DIFFERENTIAL TYPE: NORMAL
EOSINOPHILS RELATIVE PERCENT: 1 %
ESTIMATED AVERAGE GLUCOSE: 117 MG/DL
GFR AFRICAN AMERICAN: >60 ML/MIN
GFR NON-AFRICAN AMERICAN: >60 ML/MIN
GFR SERPL CREATININE-BSD FRML MDRD: ABNORMAL ML/MIN/{1.73_M2}
GFR SERPL CREATININE-BSD FRML MDRD: ABNORMAL ML/MIN/{1.73_M2}
GLUCOSE BLD-MCNC: 117 MG/DL (ref 75–110)
GLUCOSE BLD-MCNC: 130 MG/DL (ref 70–99)
GLUCOSE BLD-MCNC: 130 MG/DL (ref 75–110)
GLUCOSE BLD-MCNC: 130 MG/DL (ref 75–110)
GLUCOSE BLD-MCNC: 132 MG/DL (ref 75–110)
GLUCOSE BLD-MCNC: 134 MG/DL (ref 75–110)
GLUCOSE BLD-MCNC: 153 MG/DL (ref 75–110)
GLUCOSE BLD-MCNC: 172 MG/DL (ref 75–110)
HBA1C MFR BLD: 5.7 % (ref 4–6)
HCT VFR BLD CALC: 45.1 % (ref 41–53)
HEMOGLOBIN: 15.4 G/DL (ref 13.5–17.5)
LYMPHOCYTES # BLD: 18 %
Lab: NORMAL
MCH RBC QN AUTO: 29.4 PG (ref 26–34)
MCHC RBC AUTO-ENTMCNC: 34.1 G/DL (ref 31–37)
MCV RBC AUTO: 86 FL (ref 80–100)
MONOCYTES # BLD: 8 %
PDW BLD-RTO: 14.2 % (ref 12.5–15.4)
PLATELET # BLD: 195 K/UL (ref 140–450)
PLATELET ESTIMATE: NORMAL
PMV BLD AUTO: 6.7 FL (ref 6–12)
POTASSIUM SERPL-SCNC: 4.3 MMOL/L (ref 3.7–5.3)
RBC # BLD: 5.24 M/UL (ref 4.5–5.9)
RBC # BLD: NORMAL 10*6/UL
SEG NEUTROPHILS: 72 %
SEGMENTED NEUTROPHILS ABSOLUTE COUNT: 6.5 K/UL (ref 1.8–7.7)
SODIUM BLD-SCNC: 136 MMOL/L (ref 135–144)
SPECIMEN DESCRIPTION: NORMAL
STATUS: NORMAL
WBC # BLD: 9 K/UL (ref 3.5–11)
WBC # BLD: NORMAL 10*3/UL

## 2017-05-10 PROCEDURE — 97110 THERAPEUTIC EXERCISES: CPT

## 2017-05-10 PROCEDURE — 83036 HEMOGLOBIN GLYCOSYLATED A1C: CPT

## 2017-05-10 PROCEDURE — G8988 SELF CARE GOAL STATUS: HCPCS

## 2017-05-10 PROCEDURE — G8979 MOBILITY GOAL STATUS: HCPCS

## 2017-05-10 PROCEDURE — 72125 CT NECK SPINE W/O DYE: CPT

## 2017-05-10 PROCEDURE — 80048 BASIC METABOLIC PNL TOTAL CA: CPT

## 2017-05-10 PROCEDURE — 97162 PT EVAL MOD COMPLEX 30 MIN: CPT

## 2017-05-10 PROCEDURE — 97165 OT EVAL LOW COMPLEX 30 MIN: CPT

## 2017-05-10 PROCEDURE — 97535 SELF CARE MNGMENT TRAINING: CPT

## 2017-05-10 PROCEDURE — 82947 ASSAY GLUCOSE BLOOD QUANT: CPT

## 2017-05-10 PROCEDURE — G8989 SELF CARE D/C STATUS: HCPCS

## 2017-05-10 PROCEDURE — 6360000002 HC RX W HCPCS: Performed by: NEUROLOGICAL SURGERY

## 2017-05-10 PROCEDURE — 6370000000 HC RX 637 (ALT 250 FOR IP): Performed by: NEUROLOGICAL SURGERY

## 2017-05-10 PROCEDURE — 36415 COLL VENOUS BLD VENIPUNCTURE: CPT

## 2017-05-10 PROCEDURE — 85025 COMPLETE CBC W/AUTO DIFF WBC: CPT

## 2017-05-10 PROCEDURE — G8987 SELF CARE CURRENT STATUS: HCPCS

## 2017-05-10 PROCEDURE — 2580000003 HC RX 258: Performed by: NEUROLOGICAL SURGERY

## 2017-05-10 PROCEDURE — G8978 MOBILITY CURRENT STATUS: HCPCS

## 2017-05-10 PROCEDURE — 1200000000 HC SEMI PRIVATE

## 2017-05-10 PROCEDURE — 99253 IP/OBS CNSLTJ NEW/EST LOW 45: CPT | Performed by: FAMILY MEDICINE

## 2017-05-10 RX ORDER — DIAZEPAM 5 MG/1
5 TABLET ORAL ONCE
Status: DISCONTINUED | OUTPATIENT
Start: 2017-05-10 | End: 2017-05-11 | Stop reason: HOSPADM

## 2017-05-10 RX ADMIN — HYDROCHLOROTHIAZIDE 25 MG: 25 TABLET ORAL at 08:05

## 2017-05-10 RX ADMIN — DEXTROSE MONOHYDRATE 3 G: 50 INJECTION, SOLUTION INTRAVENOUS at 02:21

## 2017-05-10 RX ADMIN — Medication 10 ML: at 20:18

## 2017-05-10 RX ADMIN — DOCUSATE SODIUM 100 MG: 100 CAPSULE ORAL at 08:05

## 2017-05-10 RX ADMIN — OXYCODONE HYDROCHLORIDE AND ACETAMINOPHEN 2 TABLET: 5; 325 TABLET ORAL at 21:20

## 2017-05-10 RX ADMIN — DOCUSATE SODIUM 100 MG: 100 CAPSULE ORAL at 20:18

## 2017-05-10 RX ADMIN — OXYCODONE HYDROCHLORIDE AND ACETAMINOPHEN 2 TABLET: 5; 325 TABLET ORAL at 01:58

## 2017-05-10 RX ADMIN — GABAPENTIN 100 MG: 100 CAPSULE ORAL at 20:18

## 2017-05-10 RX ADMIN — MULTIPLE VITAMINS W/ MINERALS TAB 1 TABLET: TAB at 08:05

## 2017-05-10 RX ADMIN — CYCLOBENZAPRINE 10 MG: 10 TABLET, FILM COATED ORAL at 13:42

## 2017-05-10 RX ADMIN — CYCLOBENZAPRINE 10 MG: 10 TABLET, FILM COATED ORAL at 20:18

## 2017-05-10 RX ADMIN — OXYCODONE HYDROCHLORIDE AND ACETAMINOPHEN 1 TABLET: 5; 325 TABLET ORAL at 11:18

## 2017-05-10 RX ADMIN — CETIRIZINE HYDROCHLORIDE 10 MG: 10 TABLET ORAL at 08:05

## 2017-05-10 RX ADMIN — FAMOTIDINE 20 MG: 20 TABLET, FILM COATED ORAL at 08:05

## 2017-05-10 RX ADMIN — OXYCODONE HYDROCHLORIDE AND ACETAMINOPHEN 1 TABLET: 5; 325 TABLET ORAL at 16:59

## 2017-05-10 RX ADMIN — GABAPENTIN 100 MG: 100 CAPSULE ORAL at 13:42

## 2017-05-10 RX ADMIN — ACETAMINOPHEN 650 MG: 325 TABLET ORAL at 20:18

## 2017-05-10 RX ADMIN — GABAPENTIN 100 MG: 100 CAPSULE ORAL at 08:05

## 2017-05-10 RX ADMIN — FAMOTIDINE 20 MG: 20 TABLET, FILM COATED ORAL at 20:18

## 2017-05-10 RX ADMIN — INSULIN LISPRO 2 UNITS: 100 INJECTION, SOLUTION INTRAVENOUS; SUBCUTANEOUS at 22:04

## 2017-05-10 RX ADMIN — CYCLOBENZAPRINE 10 MG: 10 TABLET, FILM COATED ORAL at 08:05

## 2017-05-10 RX ADMIN — LISINOPRIL 20 MG: 20 TABLET ORAL at 08:05

## 2017-05-10 RX ADMIN — OXYCODONE HYDROCHLORIDE AND ACETAMINOPHEN 2 TABLET: 5; 325 TABLET ORAL at 06:34

## 2017-05-10 ASSESSMENT — PAIN SCALES - GENERAL
PAINLEVEL_OUTOF10: 7
PAINLEVEL_OUTOF10: 2
PAINLEVEL_OUTOF10: 7
PAINLEVEL_OUTOF10: 7
PAINLEVEL_OUTOF10: 3
PAINLEVEL_OUTOF10: 2
PAINLEVEL_OUTOF10: 5
PAINLEVEL_OUTOF10: 5
PAINLEVEL_OUTOF10: 7
PAINLEVEL_OUTOF10: 3
PAINLEVEL_OUTOF10: 4
PAINLEVEL_OUTOF10: 5

## 2017-05-10 ASSESSMENT — PAIN DESCRIPTION - PROGRESSION
CLINICAL_PROGRESSION: NOT CHANGED
CLINICAL_PROGRESSION: GRADUALLY IMPROVING
CLINICAL_PROGRESSION: NOT CHANGED

## 2017-05-10 ASSESSMENT — ENCOUNTER SYMPTOMS
ABDOMINAL PAIN: 0
RECTAL PAIN: 0
DIARRHEA: 0
SORE THROAT: 0
EYE PAIN: 0
COUGH: 0
CONSTIPATION: 0
BLOOD IN STOOL: 0
NAUSEA: 0
BACK PAIN: 0
VOMITING: 0
CHEST TIGHTNESS: 0
SHORTNESS OF BREATH: 0
WHEEZING: 0
SINUS PRESSURE: 0

## 2017-05-10 ASSESSMENT — PAIN DESCRIPTION - DESCRIPTORS
DESCRIPTORS: DISCOMFORT
DESCRIPTORS: DISCOMFORT
DESCRIPTORS: DISCOMFORT;DULL

## 2017-05-10 ASSESSMENT — PAIN DESCRIPTION - ORIENTATION
ORIENTATION: POSTERIOR

## 2017-05-10 ASSESSMENT — PAIN DESCRIPTION - ONSET
ONSET: ON-GOING
ONSET: ON-GOING

## 2017-05-10 ASSESSMENT — PAIN DESCRIPTION - LOCATION
LOCATION: NECK

## 2017-05-10 ASSESSMENT — PAIN DESCRIPTION - PAIN TYPE
TYPE: SURGICAL PAIN

## 2017-05-10 ASSESSMENT — PAIN DESCRIPTION - FREQUENCY
FREQUENCY: CONTINUOUS
FREQUENCY: CONTINUOUS

## 2017-05-11 VITALS
SYSTOLIC BLOOD PRESSURE: 120 MMHG | BODY MASS INDEX: 37.8 KG/M2 | HEIGHT: 71 IN | HEART RATE: 80 BPM | DIASTOLIC BLOOD PRESSURE: 71 MMHG | TEMPERATURE: 98.5 F | RESPIRATION RATE: 18 BRPM | OXYGEN SATURATION: 92 % | WEIGHT: 270 LBS

## 2017-05-11 LAB
GLUCOSE BLD-MCNC: 111 MG/DL (ref 75–110)
GLUCOSE BLD-MCNC: 137 MG/DL (ref 75–110)

## 2017-05-11 PROCEDURE — 2580000003 HC RX 258: Performed by: NEUROLOGICAL SURGERY

## 2017-05-11 PROCEDURE — 6370000000 HC RX 637 (ALT 250 FOR IP): Performed by: NEUROLOGICAL SURGERY

## 2017-05-11 PROCEDURE — 97116 GAIT TRAINING THERAPY: CPT

## 2017-05-11 PROCEDURE — 99232 SBSQ HOSP IP/OBS MODERATE 35: CPT | Performed by: FAMILY MEDICINE

## 2017-05-11 PROCEDURE — 82947 ASSAY GLUCOSE BLOOD QUANT: CPT

## 2017-05-11 PROCEDURE — 97110 THERAPEUTIC EXERCISES: CPT

## 2017-05-11 RX ORDER — OXYCODONE HYDROCHLORIDE AND ACETAMINOPHEN 5; 325 MG/1; MG/1
TABLET ORAL
Qty: 60 TABLET | Refills: 0 | Status: SHIPPED | OUTPATIENT
Start: 2017-05-11 | End: 2017-08-31 | Stop reason: ALTCHOICE

## 2017-05-11 RX ORDER — CYCLOBENZAPRINE HCL 10 MG
10 TABLET ORAL 3 TIMES DAILY
Qty: 30 TABLET | Refills: 0 | Status: SHIPPED | OUTPATIENT
Start: 2017-05-11 | End: 2017-05-21

## 2017-05-11 RX ORDER — PSEUDOEPHEDRINE HCL 30 MG
100 TABLET ORAL 2 TIMES DAILY
Qty: 60 CAPSULE | Refills: 2 | Status: SHIPPED | OUTPATIENT
Start: 2017-05-11 | End: 2018-02-02 | Stop reason: ALTCHOICE

## 2017-05-11 RX ADMIN — LISINOPRIL 20 MG: 20 TABLET ORAL at 08:23

## 2017-05-11 RX ADMIN — CETIRIZINE HYDROCHLORIDE 10 MG: 10 TABLET ORAL at 08:23

## 2017-05-11 RX ADMIN — OXYCODONE HYDROCHLORIDE AND ACETAMINOPHEN 2 TABLET: 5; 325 TABLET ORAL at 01:55

## 2017-05-11 RX ADMIN — CYCLOBENZAPRINE 10 MG: 10 TABLET, FILM COATED ORAL at 14:52

## 2017-05-11 RX ADMIN — GABAPENTIN 100 MG: 100 CAPSULE ORAL at 14:52

## 2017-05-11 RX ADMIN — CYCLOBENZAPRINE 10 MG: 10 TABLET, FILM COATED ORAL at 08:23

## 2017-05-11 RX ADMIN — GABAPENTIN 100 MG: 100 CAPSULE ORAL at 08:23

## 2017-05-11 RX ADMIN — FAMOTIDINE 20 MG: 20 TABLET, FILM COATED ORAL at 08:23

## 2017-05-11 RX ADMIN — DOCUSATE SODIUM 100 MG: 100 CAPSULE ORAL at 08:23

## 2017-05-11 RX ADMIN — Medication 10 ML: at 08:25

## 2017-05-11 RX ADMIN — OXYCODONE HYDROCHLORIDE AND ACETAMINOPHEN 2 TABLET: 5; 325 TABLET ORAL at 12:50

## 2017-05-11 RX ADMIN — OXYCODONE HYDROCHLORIDE AND ACETAMINOPHEN 2 TABLET: 5; 325 TABLET ORAL at 08:23

## 2017-05-11 RX ADMIN — MULTIPLE VITAMINS W/ MINERALS TAB 1 TABLET: TAB at 08:23

## 2017-05-11 RX ADMIN — HYDROCHLOROTHIAZIDE 25 MG: 25 TABLET ORAL at 08:23

## 2017-05-11 ASSESSMENT — PAIN DESCRIPTION - PROGRESSION

## 2017-05-11 ASSESSMENT — PAIN SCALES - GENERAL
PAINLEVEL_OUTOF10: 4
PAINLEVEL_OUTOF10: 7
PAINLEVEL_OUTOF10: 8
PAINLEVEL_OUTOF10: 6
PAINLEVEL_OUTOF10: 4
PAINLEVEL_OUTOF10: 6
PAINLEVEL_OUTOF10: 7
PAINLEVEL_OUTOF10: 7

## 2017-05-11 ASSESSMENT — ENCOUNTER SYMPTOMS
VOMITING: 0
NAUSEA: 0
ABDOMINAL PAIN: 0
WHEEZING: 0
DIARRHEA: 0
CONSTIPATION: 0
SHORTNESS OF BREATH: 0

## 2017-05-23 ENCOUNTER — TELEPHONE (OUTPATIENT)
Dept: NEUROSURGERY | Age: 57
End: 2017-05-23

## 2017-05-23 DIAGNOSIS — Z98.1 S/P CERVICAL SPINAL FUSION: Primary | ICD-10-CM

## 2017-05-25 ENCOUNTER — OFFICE VISIT (OUTPATIENT)
Dept: NEUROSURGERY | Age: 57
End: 2017-05-25

## 2017-05-25 ENCOUNTER — HOSPITAL ENCOUNTER (OUTPATIENT)
Facility: CLINIC | Age: 57
Discharge: HOME | End: 2017-05-25
Payer: COMMERCIAL

## 2017-05-25 ENCOUNTER — HOSPITAL ENCOUNTER (OUTPATIENT)
Dept: GENERAL RADIOLOGY | Facility: CLINIC | Age: 57
Discharge: HOME | End: 2017-05-25
Payer: COMMERCIAL

## 2017-05-25 VITALS
TEMPERATURE: 98.4 F | WEIGHT: 267 LBS | SYSTOLIC BLOOD PRESSURE: 132 MMHG | BODY MASS INDEX: 37.38 KG/M2 | HEIGHT: 71 IN | DIASTOLIC BLOOD PRESSURE: 80 MMHG | HEART RATE: 87 BPM

## 2017-05-25 DIAGNOSIS — Z98.1 S/P CERVICAL SPINAL FUSION: ICD-10-CM

## 2017-05-25 DIAGNOSIS — Z98.1 S/P CERVICAL SPINAL FUSION: Primary | ICD-10-CM

## 2017-05-25 PROCEDURE — 99024 POSTOP FOLLOW-UP VISIT: CPT | Performed by: NEUROLOGICAL SURGERY

## 2017-05-25 PROCEDURE — 72040 X-RAY EXAM NECK SPINE 2-3 VW: CPT

## 2017-05-25 RX ORDER — CYCLOBENZAPRINE HCL 10 MG
10 TABLET ORAL EVERY 8 HOURS PRN
Qty: 30 TABLET | Refills: 3 | Status: SHIPPED | OUTPATIENT
Start: 2017-05-25 | End: 2017-06-04

## 2017-05-25 ASSESSMENT — VISUAL ACUITY: VA_NORMAL: 1

## 2017-07-06 ENCOUNTER — OFFICE VISIT (OUTPATIENT)
Dept: OPTOMETRY | Age: 57
End: 2017-07-06
Payer: COMMERCIAL

## 2017-07-06 DIAGNOSIS — H52.4 MYOPIA OF BOTH EYES WITH ASTIGMATISM AND PRESBYOPIA: Primary | ICD-10-CM

## 2017-07-06 DIAGNOSIS — H52.203 MYOPIA OF BOTH EYES WITH ASTIGMATISM AND PRESBYOPIA: Primary | ICD-10-CM

## 2017-07-06 DIAGNOSIS — E11.9 NON-INSULIN DEPENDENT TYPE 2 DIABETES MELLITUS (HCC): ICD-10-CM

## 2017-07-06 DIAGNOSIS — H52.13 MYOPIA OF BOTH EYES WITH ASTIGMATISM AND PRESBYOPIA: Primary | ICD-10-CM

## 2017-07-06 PROCEDURE — 92004 COMPRE OPH EXAM NEW PT 1/>: CPT | Performed by: OPTOMETRIST

## 2017-07-06 RX ORDER — PHENYLEPHRINE HCL 2.5 %
1 DROPS OPHTHALMIC (EYE) ONCE
Status: COMPLETED | OUTPATIENT
Start: 2017-07-06 | End: 2017-07-06

## 2017-07-06 RX ORDER — BENOXINATE HCL/FLUORESCEIN SOD 0.4%-0.25%
1 DROPS OPHTHALMIC (EYE) ONCE
Status: COMPLETED | OUTPATIENT
Start: 2017-07-06 | End: 2017-07-06

## 2017-07-06 RX ORDER — TROPICAMIDE 10 MG/ML
1 SOLUTION/ DROPS OPHTHALMIC ONCE
Status: COMPLETED | OUTPATIENT
Start: 2017-07-06 | End: 2017-07-06

## 2017-07-06 RX ADMIN — TROPICAMIDE 1 DROP: 10 SOLUTION/ DROPS OPHTHALMIC at 14:01

## 2017-07-06 RX ADMIN — Medication 1 DROP: at 14:01

## 2017-07-06 ASSESSMENT — REFRACTION_WEARINGRX
OD_SPHERE: -1.50
OS_ADD: +2.25
OS_SPHERE: -1.25
OD_AXIS: 098
OS_CYLINDER: -1.50
OS_AXIS: 067
OD_ADD: +2.25
OD_CYLINDER: -1.50
SPECS_TYPE: PAL

## 2017-07-06 ASSESSMENT — TONOMETRY
OD_IOP_MMHG: 16
IOP_METHOD: APPLANATION W FLURESS DROP
OS_IOP_MMHG: 16

## 2017-07-06 ASSESSMENT — REFRACTION_MANIFEST
OS_CYLINDER: -1.50
OD_AXIS: 099
OD_CYLINDER: -1.75
OS_AXIS: 062
OS_ADD: +2.25
OD_SPHERE: -1.00
OS_SPHERE: -1.25
OD_ADD: +2.25

## 2017-07-06 ASSESSMENT — VISUAL ACUITY
OS_CC: 20/20
METHOD: SNELLEN - LINEAR
CORRECTION_TYPE: GLASSES
OD_CC: 20/25 OU

## 2017-07-06 ASSESSMENT — SLIT LAMP EXAM - LIDS
COMMENTS: NORMAL
COMMENTS: NORMAL

## 2017-07-14 ENCOUNTER — OFFICE VISIT (OUTPATIENT)
Dept: NEUROSURGERY | Age: 57
End: 2017-07-14

## 2017-07-14 VITALS
HEIGHT: 71 IN | WEIGHT: 270 LBS | DIASTOLIC BLOOD PRESSURE: 77 MMHG | BODY MASS INDEX: 37.8 KG/M2 | SYSTOLIC BLOOD PRESSURE: 130 MMHG | HEART RATE: 75 BPM

## 2017-07-14 DIAGNOSIS — G56.02 CARPAL TUNNEL SYNDROME OF LEFT WRIST: ICD-10-CM

## 2017-07-14 DIAGNOSIS — Z98.1 S/P CERVICAL SPINAL FUSION: Primary | ICD-10-CM

## 2017-07-14 PROCEDURE — 99024 POSTOP FOLLOW-UP VISIT: CPT | Performed by: NEUROLOGICAL SURGERY

## 2017-07-14 ASSESSMENT — VISUAL ACUITY: VA_NORMAL: 1

## 2017-07-20 ENCOUNTER — TELEPHONE (OUTPATIENT)
Dept: NEUROSURGERY | Age: 57
End: 2017-07-20

## 2017-08-31 ENCOUNTER — OFFICE VISIT (OUTPATIENT)
Dept: FAMILY MEDICINE CLINIC | Age: 57
End: 2017-08-31
Payer: COMMERCIAL

## 2017-08-31 ENCOUNTER — NURSE ONLY (OUTPATIENT)
Dept: LAB | Age: 57
End: 2017-08-31
Payer: COMMERCIAL

## 2017-08-31 VITALS
HEART RATE: 73 BPM | BODY MASS INDEX: 39.37 KG/M2 | OXYGEN SATURATION: 94 % | RESPIRATION RATE: 12 BRPM | TEMPERATURE: 98.1 F | WEIGHT: 281.2 LBS | HEIGHT: 71 IN

## 2017-08-31 DIAGNOSIS — K52.1 DIARRHEA DUE TO DRUG: ICD-10-CM

## 2017-08-31 DIAGNOSIS — Z23 NEED FOR INFLUENZA VACCINATION: ICD-10-CM

## 2017-08-31 DIAGNOSIS — Z23 NEED FOR PNEUMOCOCCAL VACCINATION: ICD-10-CM

## 2017-08-31 DIAGNOSIS — K58.0 IRRITABLE BOWEL SYNDROME WITH DIARRHEA: Primary | ICD-10-CM

## 2017-08-31 PROCEDURE — 99213 OFFICE O/P EST LOW 20 MIN: CPT | Performed by: NURSE PRACTITIONER

## 2017-08-31 PROCEDURE — 90686 IIV4 VACC NO PRSV 0.5 ML IM: CPT | Performed by: NURSE PRACTITIONER

## 2017-08-31 PROCEDURE — 90471 IMMUNIZATION ADMIN: CPT | Performed by: NURSE PRACTITIONER

## 2017-08-31 PROCEDURE — 90732 PPSV23 VACC 2 YRS+ SUBQ/IM: CPT | Performed by: NURSE PRACTITIONER

## 2017-08-31 PROCEDURE — 90472 IMMUNIZATION ADMIN EACH ADD: CPT | Performed by: NURSE PRACTITIONER

## 2017-08-31 RX ORDER — HYOSCYAMINE SULFATE EXTENDED-RELEASE 0.38 MG/1
375 TABLET ORAL EVERY 12 HOURS PRN
Qty: 60 TABLET | Refills: 3 | Status: SHIPPED | OUTPATIENT
Start: 2017-08-31 | End: 2018-02-02 | Stop reason: ALTCHOICE

## 2017-08-31 RX ORDER — OMEPRAZOLE 20 MG/1
20 CAPSULE, DELAYED RELEASE ORAL DAILY
Qty: 30 CAPSULE | Refills: 3 | Status: SHIPPED | OUTPATIENT
Start: 2017-08-31 | End: 2018-02-02 | Stop reason: ALTCHOICE

## 2017-08-31 ASSESSMENT — ENCOUNTER SYMPTOMS
SINUS PRESSURE: 0
EYES NEGATIVE: 1
DIARRHEA: 1
ABDOMINAL PAIN: 0
NAUSEA: 0
COUGH: 0
ALLERGIC/IMMUNOLOGIC NEGATIVE: 1
CONSTIPATION: 0
TROUBLE SWALLOWING: 0
VOMITING: 0
SHORTNESS OF BREATH: 0
RESPIRATORY NEGATIVE: 1
CHEST TIGHTNESS: 0

## 2017-08-31 ASSESSMENT — PATIENT HEALTH QUESTIONNAIRE - PHQ9
2. FEELING DOWN, DEPRESSED OR HOPELESS: 0
1. LITTLE INTEREST OR PLEASURE IN DOING THINGS: 0
SUM OF ALL RESPONSES TO PHQ9 QUESTIONS 1 & 2: 0
SUM OF ALL RESPONSES TO PHQ QUESTIONS 1-9: 0

## 2017-09-22 DIAGNOSIS — N52.9 ERECTILE DYSFUNCTION, UNSPECIFIED ERECTILE DYSFUNCTION TYPE: ICD-10-CM

## 2017-09-22 RX ORDER — TADALAFIL 20 MG/1
TABLET ORAL
Qty: 12 TABLET | Refills: 0 | Status: SHIPPED | OUTPATIENT
Start: 2017-09-22 | End: 2018-05-20 | Stop reason: ALTCHOICE

## 2017-10-03 DIAGNOSIS — N52.1 NEUROGENIC ERECTILE DYSFUNCTION DUE TO TYPE 2 DIABETES MELLITUS (HCC): Primary | ICD-10-CM

## 2017-10-03 DIAGNOSIS — E11.69 NEUROGENIC ERECTILE DYSFUNCTION DUE TO TYPE 2 DIABETES MELLITUS (HCC): Primary | ICD-10-CM

## 2017-10-03 DIAGNOSIS — R73.01 IMPAIRED FASTING GLUCOSE: Primary | ICD-10-CM

## 2017-10-03 RX ORDER — TADALAFIL 20 MG/1
TABLET ORAL
Qty: 12 TABLET | Status: CANCELLED | OUTPATIENT
Start: 2017-10-03

## 2017-10-03 NOTE — TELEPHONE ENCOUNTER
Pt requesting paper script for price checking purposes. Pt also requesting refills be added to this script. Please call pt when script is ready.

## 2017-10-03 NOTE — TELEPHONE ENCOUNTER
Med refilled. Is pt moving out of the area? If not, please have him make appt for DM follow-up in the next 1-2 months.

## 2017-10-16 ENCOUNTER — TELEPHONE (OUTPATIENT)
Dept: FAMILY MEDICINE CLINIC | Age: 57
End: 2017-10-16

## 2017-10-16 NOTE — TELEPHONE ENCOUNTER
Pt calling stating his employer requires him to get the hep B series, but pt questions since he was treated for Hep C, if you recommend he has the hep B series, please advise pt at above number.

## 2017-10-20 DIAGNOSIS — E34.9 HYPOTESTOSTERONISM: ICD-10-CM

## 2017-10-24 RX ORDER — TESTOSTERONE GEL, 1% 10 MG/G
GEL TRANSDERMAL
Qty: 180 PACKAGE | Refills: 0 | Status: SHIPPED | OUTPATIENT
Start: 2017-10-24 | End: 2018-03-08 | Stop reason: SDUPTHER

## 2017-10-24 NOTE — TELEPHONE ENCOUNTER
2nd fax request from [facility] for testosterone gel pkt. OARRS reviewed. Testosterone last sent out 7-18-17 for #180 pkts.

## 2017-11-04 DIAGNOSIS — R73.01 IMPAIRED FASTING GLUCOSE: ICD-10-CM

## 2017-11-07 ENCOUNTER — TELEPHONE (OUTPATIENT)
Dept: FAMILY MEDICINE CLINIC | Age: 57
End: 2017-11-07

## 2017-11-07 NOTE — TELEPHONE ENCOUNTER
JEWEL note- Incoming fax from [address] stating the maximum daily dose for testosterone 1% 50 mg gel is 1 packet, not 2. Options are 1) to pursue a \"PAULA\" prior auth, 2) to change the Rx directions to 1 packet daily, or 3) to not process Rx at this time. Called Arie Allankeley to see if he thought he could suffice with 1 packet per day. States he actually lost his [employer] insurance at the end of October due to a job change. He won't be eligible for insurance though his new job until Jan 1st but stocked up on the Testosterone gel so it should last till his next appt Jan 12 if he uses 1 per day. He is requesting [facility] not process the Rx because he has no insurance coverage.

## 2017-12-14 DIAGNOSIS — I10 ESSENTIAL HYPERTENSION: Primary | ICD-10-CM

## 2017-12-15 RX ORDER — LISINOPRIL AND HYDROCHLOROTHIAZIDE 25; 20 MG/1; MG/1
1 TABLET ORAL DAILY
Qty: 90 TABLET | Refills: 3 | Status: SHIPPED | OUTPATIENT
Start: 2017-12-15 | End: 2018-02-02 | Stop reason: SDUPTHER

## 2018-02-02 ENCOUNTER — OFFICE VISIT (OUTPATIENT)
Dept: FAMILY MEDICINE CLINIC | Age: 58
End: 2018-02-02
Payer: COMMERCIAL

## 2018-02-02 VITALS
DIASTOLIC BLOOD PRESSURE: 80 MMHG | OXYGEN SATURATION: 96 % | WEIGHT: 268 LBS | SYSTOLIC BLOOD PRESSURE: 130 MMHG | BODY MASS INDEX: 37.52 KG/M2 | HEART RATE: 78 BPM

## 2018-02-02 DIAGNOSIS — E78.2 MIXED HYPERLIPIDEMIA: ICD-10-CM

## 2018-02-02 DIAGNOSIS — E34.9 HYPOTESTOSTERONISM: ICD-10-CM

## 2018-02-02 DIAGNOSIS — R73.01 IMPAIRED FASTING GLUCOSE: ICD-10-CM

## 2018-02-02 DIAGNOSIS — I10 ESSENTIAL HYPERTENSION: Primary | ICD-10-CM

## 2018-02-02 DIAGNOSIS — L82.1 SEBORRHEIC KERATOSES: ICD-10-CM

## 2018-02-02 PROCEDURE — 99214 OFFICE O/P EST MOD 30 MIN: CPT | Performed by: FAMILY MEDICINE

## 2018-02-02 RX ORDER — LISINOPRIL AND HYDROCHLOROTHIAZIDE 25; 20 MG/1; MG/1
1 TABLET ORAL DAILY
Qty: 30 TABLET | Refills: 0 | Status: SHIPPED | OUTPATIENT
Start: 2018-02-02 | End: 2018-03-08 | Stop reason: SDUPTHER

## 2018-02-02 NOTE — PROGRESS NOTES
metFORMIN (GLUCOPHAGE) 500 MG tablet    Hemoglobin A1C   3. Hypotestosteronism  Testosterone, Free   4. Mixed hyperlipidemia  Lipid Panel    Comprehensive Metabolic Panel         Plan:      Reassured pt of benign appearance of skin lesions on back - appear to be SK's. Will continue to monitor. Return in about 6 months (around 8/2/2018) for Follow-up. Orders Placed This Encounter   Procedures    Lipid Panel     Standing Status:   Future     Number of Occurrences:   1     Standing Expiration Date:   2/2/2019     Order Specific Question:   Is Patient Fasting?/# of Hours     Answer:   12    Hemoglobin A1C     Standing Status:   Future     Number of Occurrences:   1     Standing Expiration Date:   2/2/2019    Comprehensive Metabolic Panel     Standing Status:   Future     Number of Occurrences:   1     Standing Expiration Date:   2/2/2019    Testosterone, Free     Standing Status:   Future     Number of Occurrences:   1     Standing Expiration Date:   2/2/2019     Orders Placed This Encounter   Medications    lisinopril-hydrochlorothiazide (PRINZIDE;ZESTORETIC) 20-25 MG per tablet     Sig: Take 1 tablet by mouth daily     Dispense:  30 tablet     Refill:  0    metFORMIN (GLUCOPHAGE) 500 MG tablet     Sig: Take 1 tablet by mouth daily With a meal     Dispense:  30 tablet     Refill:  2       Patient given educational materials - see patient instructions. Discussed use, benefit, and side effects of prescribed medications. All patient questions answered. Pt voiced understanding. Reviewed health maintenance.               Electronically signed by Ronald Puente DO on 2/11/2018 at 11:17 PM

## 2018-02-09 ENCOUNTER — OFFICE VISIT (OUTPATIENT)
Dept: PRIMARY CARE CLINIC | Age: 58
End: 2018-02-09
Payer: COMMERCIAL

## 2018-02-09 VITALS
OXYGEN SATURATION: 98 % | TEMPERATURE: 98.3 F | DIASTOLIC BLOOD PRESSURE: 78 MMHG | WEIGHT: 267 LBS | HEART RATE: 69 BPM | HEIGHT: 71 IN | SYSTOLIC BLOOD PRESSURE: 136 MMHG | BODY MASS INDEX: 37.38 KG/M2

## 2018-02-09 DIAGNOSIS — J40 BRONCHITIS: Primary | ICD-10-CM

## 2018-02-09 PROCEDURE — 99213 OFFICE O/P EST LOW 20 MIN: CPT | Performed by: NURSE PRACTITIONER

## 2018-02-09 RX ORDER — AZITHROMYCIN 250 MG/1
TABLET, FILM COATED ORAL
Qty: 1 PACKET | Refills: 0 | Status: SHIPPED | OUTPATIENT
Start: 2018-02-09 | End: 2018-02-14

## 2018-02-09 ASSESSMENT — ENCOUNTER SYMPTOMS
EYES NEGATIVE: 1
GASTROINTESTINAL NEGATIVE: 1
RHINORRHEA: 1
ALLERGIC/IMMUNOLOGIC NEGATIVE: 1
COUGH: 1
SORE THROAT: 1
SHORTNESS OF BREATH: 1
SINUS PRESSURE: 0

## 2018-02-09 NOTE — PROGRESS NOTES
He exhibits no distension. There is no tenderness. There is no rebound. Musculoskeletal: Normal range of motion. He exhibits no edema or tenderness. Neurological: He is alert and oriented to person, place, and time. Skin: Skin is warm and dry. No rash noted. No erythema. Psychiatric: He has a normal mood and affect. His behavior is normal. Judgment and thought content normal.     /78 (Site: Right Arm, Position: Sitting, Cuff Size: Large Adult)   Pulse 69   Temp 98.3 °F (36.8 °C) (Tympanic)   Ht 5' 11\" (1.803 m)   Wt 267 lb (121.1 kg)   SpO2 98%   BMI 37.24 kg/m²     Assessment:       bronchitis  Symptoms        Plan:      zpak as directed  Discussed typical course, supportive care, and complications  Increase humidity at night.

## 2018-02-10 ENCOUNTER — HOSPITAL ENCOUNTER (OUTPATIENT)
Dept: LAB | Age: 58
Setting detail: SPECIMEN
Discharge: HOME | End: 2018-02-10
Payer: COMMERCIAL

## 2018-02-10 DIAGNOSIS — E78.2 MIXED HYPERLIPIDEMIA: ICD-10-CM

## 2018-02-10 DIAGNOSIS — E34.9 HYPOTESTOSTERONISM: ICD-10-CM

## 2018-02-10 DIAGNOSIS — R73.01 IMPAIRED FASTING GLUCOSE: ICD-10-CM

## 2018-02-10 DIAGNOSIS — I10 ESSENTIAL HYPERTENSION: ICD-10-CM

## 2018-02-10 LAB
ALBUMIN SERPL-MCNC: 4.5 G/DL (ref 3.5–5.2)
ALBUMIN/GLOBULIN RATIO: 1.4 (ref 1–2.5)
ALP BLD-CCNC: 73 U/L (ref 40–129)
ALT SERPL-CCNC: 53 U/L (ref 5–41)
ANION GAP SERPL CALCULATED.3IONS-SCNC: 19 MMOL/L (ref 9–17)
AST SERPL-CCNC: 35 U/L
BILIRUB SERPL-MCNC: 0.91 MG/DL (ref 0.3–1.2)
BUN BLDV-MCNC: 17 MG/DL (ref 6–20)
BUN/CREAT BLD: 25 (ref 9–20)
CALCIUM SERPL-MCNC: 9.6 MG/DL (ref 8.6–10.4)
CHLORIDE BLD-SCNC: 97 MMOL/L (ref 98–107)
CHOLESTEROL/HDL RATIO: 5.8
CHOLESTEROL: 151 MG/DL
CO2: 21 MMOL/L (ref 20–31)
CREAT SERPL-MCNC: 0.68 MG/DL (ref 0.7–1.2)
ESTIMATED AVERAGE GLUCOSE: 126 MG/DL
GFR AFRICAN AMERICAN: >60 ML/MIN
GFR NON-AFRICAN AMERICAN: >60 ML/MIN
GFR SERPL CREATININE-BSD FRML MDRD: ABNORMAL ML/MIN/{1.73_M2}
GFR SERPL CREATININE-BSD FRML MDRD: ABNORMAL ML/MIN/{1.73_M2}
GLUCOSE BLD-MCNC: 103 MG/DL (ref 70–99)
HBA1C MFR BLD: 6 % (ref 4.8–5.9)
HDLC SERPL-MCNC: 26 MG/DL
LDL CHOLESTEROL: 101 MG/DL (ref 0–130)
POTASSIUM SERPL-SCNC: 4.2 MMOL/L (ref 3.7–5.3)
SEX HORMONE BINDING GLOBULIN: 37 NMOL/L (ref 11–80)
SODIUM BLD-SCNC: 137 MMOL/L (ref 135–144)
TESTOSTERONE FREE-NONMALE: 131.7 PG/ML (ref 47–244)
TESTOSTERONE TOTAL: 643 NG/DL (ref 220–1000)
TOTAL PROTEIN: 7.7 G/DL (ref 6.4–8.3)
TRIGL SERPL-MCNC: 120 MG/DL
VLDLC SERPL CALC-MCNC: ABNORMAL MG/DL (ref 1–30)

## 2018-02-10 PROCEDURE — 83036 HEMOGLOBIN GLYCOSYLATED A1C: CPT

## 2018-02-10 PROCEDURE — 80053 COMPREHEN METABOLIC PANEL: CPT

## 2018-02-10 PROCEDURE — 84403 ASSAY OF TOTAL TESTOSTERONE: CPT

## 2018-02-10 PROCEDURE — 84270 ASSAY OF SEX HORMONE GLOBUL: CPT

## 2018-02-10 PROCEDURE — 36415 COLL VENOUS BLD VENIPUNCTURE: CPT

## 2018-02-10 PROCEDURE — 80061 LIPID PANEL: CPT

## 2018-02-11 ASSESSMENT — ENCOUNTER SYMPTOMS
APNEA: 0
ROS SKIN COMMENTS: SKIN LESIONS ON BACK

## 2018-02-27 ENCOUNTER — TELEPHONE (OUTPATIENT)
Dept: FAMILY MEDICINE CLINIC | Age: 58
End: 2018-02-27

## 2018-03-08 DIAGNOSIS — R73.01 IMPAIRED FASTING GLUCOSE: ICD-10-CM

## 2018-03-08 DIAGNOSIS — E34.9 HYPOTESTOSTERONISM: ICD-10-CM

## 2018-03-08 DIAGNOSIS — I10 ESSENTIAL HYPERTENSION: ICD-10-CM

## 2018-03-09 RX ORDER — TESTOSTERONE GEL, 1% 10 MG/G
GEL TRANSDERMAL
Qty: 180 PACKAGE | Refills: 0 | Status: SHIPPED | OUTPATIENT
Start: 2018-03-09 | End: 2018-04-04 | Stop reason: CLARIF

## 2018-03-09 RX ORDER — LISINOPRIL AND HYDROCHLOROTHIAZIDE 25; 20 MG/1; MG/1
1 TABLET ORAL DAILY
Qty: 30 TABLET | Refills: 5 | Status: SHIPPED | OUTPATIENT
Start: 2018-03-09 | End: 2018-08-02 | Stop reason: SDUPTHER

## 2018-03-10 NOTE — TELEPHONE ENCOUNTER
Controlled Substances Monitoring: The Prescription Monitoring Report for this patient was reviewed today.  (Christel Dupree, DO)    No signs of potential drug abuse or diversion identified. (Christel Dupree, DO)

## 2018-03-28 DIAGNOSIS — N52.9 ERECTILE DYSFUNCTION, UNSPECIFIED ERECTILE DYSFUNCTION TYPE: Primary | ICD-10-CM

## 2018-03-29 RX ORDER — SILDENAFIL 50 MG/1
TABLET, FILM COATED ORAL
Qty: 30 TABLET | Refills: 3 | Status: SHIPPED | OUTPATIENT
Start: 2018-03-29 | End: 2018-03-30 | Stop reason: SDUPTHER

## 2018-03-30 DIAGNOSIS — N52.9 ERECTILE DYSFUNCTION, UNSPECIFIED ERECTILE DYSFUNCTION TYPE: Primary | ICD-10-CM

## 2018-03-30 RX ORDER — SILDENAFIL 50 MG/1
TABLET, FILM COATED ORAL
Qty: 30 TABLET | Refills: 0 | Status: SHIPPED | OUTPATIENT
Start: 2018-03-30 | End: 2018-12-17 | Stop reason: SDUPTHER

## 2018-04-02 ENCOUNTER — TELEPHONE (OUTPATIENT)
Dept: FAMILY MEDICINE CLINIC | Age: 58
End: 2018-04-02

## 2018-04-02 DIAGNOSIS — E34.9 HYPOTESTOSTERONISM: ICD-10-CM

## 2018-04-04 RX ORDER — TESTOSTERONE 16.2 MG/G
4 GEL TRANSDERMAL DAILY
Qty: 7.2 G | Refills: 0 | Status: SHIPPED | OUTPATIENT
Start: 2018-04-04 | End: 2018-04-30 | Stop reason: SDUPTHER

## 2018-04-19 ENCOUNTER — TELEPHONE (OUTPATIENT)
Dept: FAMILY MEDICINE CLINIC | Age: 58
End: 2018-04-19

## 2018-04-30 ENCOUNTER — TELEPHONE (OUTPATIENT)
Dept: FAMILY MEDICINE CLINIC | Age: 58
End: 2018-04-30

## 2018-04-30 DIAGNOSIS — E34.9 HYPOTESTOSTERONISM: ICD-10-CM

## 2018-04-30 RX ORDER — TESTOSTERONE 16.2 MG/G
4 GEL TRANSDERMAL DAILY
Qty: 6 BOTTLE | Refills: 0 | Status: SHIPPED | OUTPATIENT
Start: 2018-04-30 | End: 2018-11-26 | Stop reason: SDUPTHER

## 2018-05-20 ENCOUNTER — OFFICE VISIT (OUTPATIENT)
Dept: PRIMARY CARE CLINIC | Age: 58
End: 2018-05-20
Payer: COMMERCIAL

## 2018-05-20 VITALS
HEIGHT: 71 IN | WEIGHT: 245.2 LBS | BODY MASS INDEX: 34.33 KG/M2 | HEART RATE: 86 BPM | RESPIRATION RATE: 12 BRPM | OXYGEN SATURATION: 98 % | SYSTOLIC BLOOD PRESSURE: 152 MMHG | TEMPERATURE: 98.7 F | DIASTOLIC BLOOD PRESSURE: 72 MMHG

## 2018-05-20 DIAGNOSIS — H66.002 ACUTE SUPPURATIVE OTITIS MEDIA OF LEFT EAR WITHOUT SPONTANEOUS RUPTURE OF TYMPANIC MEMBRANE, RECURRENCE NOT SPECIFIED: Primary | ICD-10-CM

## 2018-05-20 PROCEDURE — 99213 OFFICE O/P EST LOW 20 MIN: CPT | Performed by: NURSE PRACTITIONER

## 2018-05-20 RX ORDER — CEFUROXIME AXETIL 500 MG/1
500 TABLET ORAL 2 TIMES DAILY
Qty: 20 TABLET | Refills: 0 | Status: SHIPPED | OUTPATIENT
Start: 2018-05-20 | End: 2018-05-20 | Stop reason: CLARIF

## 2018-05-20 RX ORDER — CEFUROXIME AXETIL 500 MG/1
500 TABLET ORAL 2 TIMES DAILY
Qty: 20 TABLET | Refills: 0 | Status: SHIPPED | OUTPATIENT
Start: 2018-05-20 | End: 2018-05-30

## 2018-05-20 RX ORDER — AMOXICILLIN 875 MG/1
TABLET, COATED ORAL
Refills: 0 | COMMUNITY
Start: 2018-04-19 | End: 2018-12-17 | Stop reason: ALTCHOICE

## 2018-05-20 ASSESSMENT — PATIENT HEALTH QUESTIONNAIRE - PHQ9
SUM OF ALL RESPONSES TO PHQ9 QUESTIONS 1 & 2: 0
2. FEELING DOWN, DEPRESSED OR HOPELESS: 0
SUM OF ALL RESPONSES TO PHQ QUESTIONS 1-9: 0
1. LITTLE INTEREST OR PLEASURE IN DOING THINGS: 0

## 2018-05-20 ASSESSMENT — ENCOUNTER SYMPTOMS
RHINORRHEA: 0
WHEEZING: 0
SINUS PRESSURE: 0
SORE THROAT: 1
COUGH: 0
SHORTNESS OF BREATH: 0

## 2018-05-30 ENCOUNTER — OFFICE VISIT (OUTPATIENT)
Dept: PRIMARY CARE CLINIC | Age: 58
End: 2018-05-30
Payer: COMMERCIAL

## 2018-05-30 VITALS
OXYGEN SATURATION: 98 % | SYSTOLIC BLOOD PRESSURE: 158 MMHG | BODY MASS INDEX: 34.58 KG/M2 | HEIGHT: 71 IN | DIASTOLIC BLOOD PRESSURE: 88 MMHG | WEIGHT: 247 LBS | TEMPERATURE: 98.9 F | HEART RATE: 82 BPM

## 2018-05-30 DIAGNOSIS — K04.7 DENTAL ABSCESS: Primary | ICD-10-CM

## 2018-05-30 PROCEDURE — 99214 OFFICE O/P EST MOD 30 MIN: CPT | Performed by: FAMILY MEDICINE

## 2018-05-30 RX ORDER — AMOXICILLIN AND CLAVULANATE POTASSIUM 875; 125 MG/1; MG/1
1 TABLET, FILM COATED ORAL 2 TIMES DAILY
Qty: 20 TABLET | Refills: 0 | Status: SHIPPED | OUTPATIENT
Start: 2018-05-30 | End: 2018-06-09

## 2018-05-30 RX ORDER — PREDNISONE 20 MG/1
TABLET ORAL
Qty: 7 TABLET | Refills: 0 | Status: SHIPPED | OUTPATIENT
Start: 2018-05-30 | End: 2018-12-17 | Stop reason: ALTCHOICE

## 2018-05-30 RX ORDER — CLINDAMYCIN HYDROCHLORIDE 150 MG/1
CAPSULE ORAL
Refills: 0 | COMMUNITY
Start: 2018-05-22 | End: 2018-12-17 | Stop reason: ALTCHOICE

## 2018-06-03 ASSESSMENT — ENCOUNTER SYMPTOMS
SORE THROAT: 0
GASTROINTESTINAL NEGATIVE: 1
TROUBLE SWALLOWING: 0
EYES NEGATIVE: 1
FACIAL SWELLING: 1
SINUS PAIN: 0
SINUS PRESSURE: 0
RESPIRATORY NEGATIVE: 1
RHINORRHEA: 0

## 2018-08-02 DIAGNOSIS — I10 ESSENTIAL HYPERTENSION: ICD-10-CM

## 2018-08-02 DIAGNOSIS — R73.01 IMPAIRED FASTING GLUCOSE: ICD-10-CM

## 2018-08-02 RX ORDER — LISINOPRIL AND HYDROCHLOROTHIAZIDE 25; 20 MG/1; MG/1
1 TABLET ORAL DAILY
Qty: 30 TABLET | Refills: 2 | Status: SHIPPED | OUTPATIENT
Start: 2018-08-02 | End: 2018-11-26 | Stop reason: SDUPTHER

## 2018-11-26 DIAGNOSIS — I10 ESSENTIAL HYPERTENSION: ICD-10-CM

## 2018-11-26 DIAGNOSIS — R73.01 IMPAIRED FASTING GLUCOSE: ICD-10-CM

## 2018-11-26 DIAGNOSIS — E34.9 HYPOTESTOSTERONISM: ICD-10-CM

## 2018-11-26 RX ORDER — LISINOPRIL AND HYDROCHLOROTHIAZIDE 25; 20 MG/1; MG/1
1 TABLET ORAL DAILY
Qty: 30 TABLET | Refills: 2 | Status: CANCELLED | OUTPATIENT
Start: 2018-11-26

## 2018-11-26 RX ORDER — TESTOSTERONE 16.2 MG/G
4 GEL TRANSDERMAL DAILY
Qty: 6 BOTTLE | Refills: 0 | Status: CANCELLED | OUTPATIENT
Start: 2018-11-26 | End: 2019-02-24

## 2018-11-28 RX ORDER — LISINOPRIL AND HYDROCHLOROTHIAZIDE 25; 20 MG/1; MG/1
1 TABLET ORAL DAILY
Qty: 90 TABLET | Refills: 3 | Status: SHIPPED | OUTPATIENT
Start: 2018-11-28 | End: 2019-07-10 | Stop reason: SDUPTHER

## 2018-11-28 NOTE — TELEPHONE ENCOUNTER
Per OARRS, the past 2 testosterone Rx's were prescribed by another provider - can you please get more information from pt?

## 2018-12-01 RX ORDER — TESTOSTERONE 16.2 MG/G
4 GEL TRANSDERMAL DAILY
Qty: 6 BOTTLE | Refills: 0 | Status: SHIPPED | OUTPATIENT
Start: 2018-12-01 | End: 2018-12-12 | Stop reason: SDUPTHER

## 2018-12-12 DIAGNOSIS — E34.9 HYPOTESTOSTERONISM: ICD-10-CM

## 2018-12-12 RX ORDER — TESTOSTERONE 16.2 MG/G
4 GEL TRANSDERMAL DAILY
Qty: 6 BOTTLE | Refills: 0 | Status: SHIPPED | OUTPATIENT
Start: 2018-12-12 | End: 2018-12-24

## 2018-12-13 NOTE — TELEPHONE ENCOUNTER
Controlled Substances Monitoring:     RX Monitoring 12/12/2018   Attestation The Prescription Monitoring Report for this patient was reviewed today. Documentation No signs of potential drug abuse or diversion identified.

## 2018-12-17 ENCOUNTER — OFFICE VISIT (OUTPATIENT)
Dept: FAMILY MEDICINE CLINIC | Age: 58
End: 2018-12-17
Payer: COMMERCIAL

## 2018-12-17 VITALS
WEIGHT: 282.8 LBS | BODY MASS INDEX: 37.48 KG/M2 | OXYGEN SATURATION: 98 % | HEART RATE: 78 BPM | SYSTOLIC BLOOD PRESSURE: 120 MMHG | DIASTOLIC BLOOD PRESSURE: 86 MMHG | HEIGHT: 73 IN

## 2018-12-17 DIAGNOSIS — H91.93 BILATERAL HEARING LOSS, UNSPECIFIED HEARING LOSS TYPE: ICD-10-CM

## 2018-12-17 DIAGNOSIS — N52.9 ERECTILE DYSFUNCTION, UNSPECIFIED ERECTILE DYSFUNCTION TYPE: ICD-10-CM

## 2018-12-17 DIAGNOSIS — G47.33 SLEEP APNEA, OBSTRUCTIVE: Chronic | ICD-10-CM

## 2018-12-17 DIAGNOSIS — R20.2 PARESTHESIA OF LEFT UPPER EXTREMITY: ICD-10-CM

## 2018-12-17 DIAGNOSIS — E34.9 HYPOTESTOSTERONISM: ICD-10-CM

## 2018-12-17 DIAGNOSIS — R73.01 IMPAIRED FASTING GLUCOSE: ICD-10-CM

## 2018-12-17 DIAGNOSIS — I10 ESSENTIAL HYPERTENSION: Primary | Chronic | ICD-10-CM

## 2018-12-17 PROCEDURE — 99214 OFFICE O/P EST MOD 30 MIN: CPT | Performed by: FAMILY MEDICINE

## 2018-12-17 RX ORDER — GABAPENTIN 100 MG/1
100 CAPSULE ORAL 3 TIMES DAILY
Qty: 90 CAPSULE | Refills: 0 | Status: SHIPPED | OUTPATIENT
Start: 2018-12-17 | End: 2019-01-07 | Stop reason: SINTOL

## 2018-12-17 RX ORDER — SILDENAFIL 50 MG/1
TABLET, FILM COATED ORAL
Qty: 30 TABLET | Refills: 0 | Status: SHIPPED | OUTPATIENT
Start: 2018-12-17 | End: 2019-06-20 | Stop reason: SDUPTHER

## 2018-12-17 ASSESSMENT — ENCOUNTER SYMPTOMS
BLOOD IN STOOL: 0
SHORTNESS OF BREATH: 0

## 2018-12-17 NOTE — PROGRESS NOTES
[facility] [address]  [address], [address] [place]  [phone]      Del Winkler is a 66 y.o. male who presents today for his medical conditions/complaintsas noted below. Del Winkler is c/o of Hypertension (follow) and Hyperlipidemia (follow up)      HPI:     Pt here today for follow-up of HTN and HLD. Has been taking lower dose of testosterone over the summer, as he had to get this filled through [facility] due to temporary change in insurance. When he had testosterone level drawn for [employer], he had taken the med that morning, so level was normal.  He states they would only refill a lower dose because of this. Has now gotten a refill sent in from me on 12/12 - new prior auth being sent from [employer]. When he has this, he applies once daily, sometimes twice daily (maybe 3x's per week).     BP well-controlled today - 120/86. Taking Lisinopril-HCTZ 20-25 mg daily for HTN. Taking this compliantly.     Taking Metformin 500 mg daily for IFG - stable on this. Went off of this for a month over the summer to see if it was causing diarrhea; the diarrhea was improved off the med, but he did re-start the Metformin and is still diarrhea-free at this time. Would like to re-start Gabapentin for numbness/tingling in his LUE - was taking this for a short time, but didn't think it was helping, so he stopped it. Now, looking back, he feels that this was likely helping him, and would like to re-start it. Taking daily multi-vitamin and Calcium-Magnesium-Zinc supplement.     Needs refill of Viagra - taking as needed, which works well for him. No longer has CPAP machine; used it for a couple months, but wasn't using it as often as was required, so insurance company took it back in late 2017.         Past Medical History:   Diagnosis Date    Cervical disc disease     Depression     Diabetes mellitus (HCC)     PREDIABETIC    Erectile dysfunction

## 2018-12-24 ENCOUNTER — OFFICE VISIT (OUTPATIENT)
Dept: PRIMARY CARE CLINIC | Age: 58
End: 2018-12-24
Payer: COMMERCIAL

## 2018-12-24 ENCOUNTER — HOSPITAL ENCOUNTER (OUTPATIENT)
Dept: LAB | Age: 58
Discharge: HOME | End: 2018-12-24
Payer: COMMERCIAL

## 2018-12-24 VITALS
SYSTOLIC BLOOD PRESSURE: 126 MMHG | OXYGEN SATURATION: 98 % | HEART RATE: 74 BPM | WEIGHT: 283.8 LBS | DIASTOLIC BLOOD PRESSURE: 74 MMHG | TEMPERATURE: 98 F | BODY MASS INDEX: 37.96 KG/M2

## 2018-12-24 DIAGNOSIS — J40 BRONCHITIS: Primary | ICD-10-CM

## 2018-12-24 DIAGNOSIS — E34.9 HYPOTESTOSTERONISM: ICD-10-CM

## 2018-12-24 LAB
SEX HORMONE BINDING GLOBULIN: 18 NMOL/L (ref 11–80)
TESTOSTERONE FREE-NONMALE: 22.2 PG/ML (ref 47–244)
TESTOSTERONE TOTAL: 89 NG/DL (ref 220–1000)

## 2018-12-24 PROCEDURE — 84270 ASSAY OF SEX HORMONE GLOBUL: CPT

## 2018-12-24 PROCEDURE — 36415 COLL VENOUS BLD VENIPUNCTURE: CPT

## 2018-12-24 PROCEDURE — 99214 OFFICE O/P EST MOD 30 MIN: CPT | Performed by: FAMILY MEDICINE

## 2018-12-24 PROCEDURE — 84403 ASSAY OF TOTAL TESTOSTERONE: CPT

## 2018-12-24 RX ORDER — PREDNISONE 20 MG/1
40 TABLET ORAL DAILY
Qty: 10 TABLET | Refills: 0 | Status: SHIPPED | OUTPATIENT
Start: 2018-12-24 | End: 2018-12-29

## 2018-12-24 RX ORDER — AZITHROMYCIN 250 MG/1
TABLET, FILM COATED ORAL
Qty: 1 PACKET | Refills: 0 | Status: SHIPPED | OUTPATIENT
Start: 2018-12-24 | End: 2018-12-29

## 2018-12-24 ASSESSMENT — ENCOUNTER SYMPTOMS
WHEEZING: 1
SHORTNESS OF BREATH: 1
NAUSEA: 0
SINUS PRESSURE: 1
COUGH: 1
SORE THROAT: 1
RHINORRHEA: 1
VOMITING: 0
DIARRHEA: 0

## 2018-12-24 NOTE — PATIENT INSTRUCTIONS
good.  When should you call for help? Call 911 anytime you think you may need emergency care. For example, call if:    · You have severe trouble breathing.    Call your doctor now or seek immediate medical care if:    · You have new or worse trouble breathing.     · You cough up dark brown or bloody mucus (sputum).     · You have a new or higher fever.     · You have a new rash.    Watch closely for changes in your health, and be sure to contact your doctor if:    · You cough more deeply or more often, especially if you notice more mucus or a change in the color of your mucus.     · You are not getting better as expected. Where can you learn more? Go to https://[email or web address].gocarshare.com. org and sign in to your Allmyapps account. Enter H333 in the statusboom box to learn more about \"Bronchitis: Care Instructions. \"     If you do not have an account, please click on the \"Sign Up Now\" link. Current as of: December 6, 2017  Content Version: 11.8  © 7534-7770 Healthwise, Incorporated. Care instructions adapted under license by [facility] ([facility]). If you have questions about a medical condition or this instruction, always ask your healthcare professional. [place] [address] any warranty or liability for your use of this information.

## 2018-12-24 NOTE — PROGRESS NOTES
[address]  [address], [address] [place]  [phone]      Kervin Mcmahon is a 66 y.o. male who is c/o of Cough (chest pain )      HPI:     Cough   This is a new problem. The current episode started in the past 7 days (4-5 days ago). The problem has been gradually worsening. The cough is productive of sputum (only minimally productive of sputum). Associated symptoms include chest pain (states his \"lungs hurt\"), chills, a fever (unmeasured, but pt felt feverish), rhinorrhea, a sore throat, shortness of breath and wheezing. Pertinent negatives include no ear pain. Treatments tried: Mucinex cough syrup, Nyquil, heating pad.        Subjective:      Past Medical History:   Diagnosis Date    Cervical disc disease     Depression     Diabetes mellitus ([address]     PREDIABETIC    Erectile dysfunction     Hepatitis C     tx 6mon completed 8/913 Dr Ronak Soto TREATMENT    History of gastroesophageal reflux (GERD)     Hypertension     Hypotestosteronism     Impaired fasting glucose     Laceration 05/06/2017    TOP OF HEAD    Neck pain     Obesity     Sleep apnea     CPAP MACHINE    Substance abuse in remission ([address]     IV DRUG USER 30 YEARS AGO    Substance abuse in remission ([address]     ALCOHOLIC 20 YEARS    Testicular hypofunction 2015    Wears glasses       Past Surgical History:   Procedure Laterality Date    CERVICAL FUSION  05/09/2017    C5-7    CERVICAL FUSION N/A 5/9/2017     ANTERIOR CERVICAL DECOMPRESSION FUSION C5-C7 performed by Ghazal Ayala MD at [address] [place] [address] COLONOSCOPY  06/18/2014    polyps    COLONOSCOPY  9/2015    sigmoid diverticulosis    HAMMER TOE SURGERY Left     2ND  AND 3RD TOE DR [facility] 12/20/12    LIVER BIOPSY      TYMPANIC MEMBRANE REPAIR      MULTIPLE AS A CHILD       Social History   Substance Use Topics    Smoking status: Former Smoker     Packs/day: 1.50     Years: 9.00     Types: Cigarettes     Quit date: 8/14/1992    Smokeless tobacco: Former User     Quit date: 8/14/1990    Alcohol use No      Comment: HISTORY OF ALCOHOL ABUSE 30 YEARS AGO quit 1989      Current Outpatient Prescriptions   Medication Sig Dispense Refill    predniSONE (DELTASONE) 20 MG tablet Take 2 tablets by mouth daily for 5 days 10 tablet 0    azithromycin (ZITHROMAX Z-ROCCO) 250 MG tablet Two pills daily first day, then one pill daily for 4 days. Take with food. 1 packet 0    gabapentin (NEURONTIN) 100 MG capsule Take 1 capsule by mouth 3 times daily for 90 days. Intended supply: 90 days. 90 capsule 0    sildenafil (VIAGRA) 50 MG tablet TAKE 1 TABLET BY MOUTH AS NEEDED FOR  ERECTILE DYSFUNCTION 30 tablet 0    lisinopril-hydrochlorothiazide (PRINZIDE;ZESTORETIC) 20-25 MG per tablet Take 1 tablet by mouth daily 90 tablet 3    metFORMIN (GLUCOPHAGE) 500 MG tablet Take 1 tablet by mouth daily With a meal 90 tablet 3    therapeutic multivitamin-minerals (THERAGRAN-M) tablet Take 1 tablet by mouth daily. No current facility-administered medications for this visit. Allergies   Allergen Reactions    Seasonal Other (See Comments)     RUNNY NOSE, WATERY EYES       Review of Systems   Constitutional: Positive for chills and fever (unmeasured, but pt felt feverish). HENT: Positive for congestion, rhinorrhea, sinus pressure and sore throat. Negative for ear pain. Respiratory: Positive for cough, shortness of breath and wheezing. Cardiovascular: Positive for chest pain (states his \"lungs hurt\"). Gastrointestinal: Negative for diarrhea, nausea and vomiting. Objective:     Vitals:    12/24/18 0858   BP: 126/74   Site: Left Upper Arm   Position: Sitting   Cuff Size: Large Adult   Pulse: 74   Temp: 98 °F (36.7 °C)   TempSrc: Tympanic   SpO2: 98%   Weight: 283 lb 12.8 oz (128.7 kg)     Physical Exam   Constitutional: He is oriented to person, place, and time. He appears well-developed and well-nourished. No distress.    HENT:

## 2019-01-07 ENCOUNTER — OFFICE VISIT (OUTPATIENT)
Dept: OTOLARYNGOLOGY | Age: 59
End: 2019-01-07
Payer: COMMERCIAL

## 2019-01-07 VITALS
DIASTOLIC BLOOD PRESSURE: 78 MMHG | RESPIRATION RATE: 14 BRPM | WEIGHT: 283 LBS | HEART RATE: 70 BPM | BODY MASS INDEX: 39.62 KG/M2 | HEIGHT: 71 IN | SYSTOLIC BLOOD PRESSURE: 138 MMHG

## 2019-01-07 DIAGNOSIS — H90.6 MIXED CONDUCTIVE AND SENSORINEURAL HEARING LOSS OF BOTH EARS: Primary | ICD-10-CM

## 2019-01-07 PROCEDURE — 99203 OFFICE O/P NEW LOW 30 MIN: CPT | Performed by: OTOLARYNGOLOGY

## 2019-01-12 DIAGNOSIS — R20.2 PARESTHESIA OF LEFT UPPER EXTREMITY: ICD-10-CM

## 2019-01-14 RX ORDER — TESTOSTERONE 12.5 MG/1.25G
GEL TOPICAL
COMMUNITY
Start: 2018-10-20 | End: 2019-01-30 | Stop reason: SDUPTHER

## 2019-01-16 RX ORDER — GABAPENTIN 100 MG/1
CAPSULE ORAL
Qty: 90 CAPSULE | Refills: 0 | OUTPATIENT
Start: 2019-01-16 | End: 2019-02-15

## 2019-01-23 DIAGNOSIS — E34.9 HYPOTESTOSTERONISM: ICD-10-CM

## 2019-01-30 RX ORDER — TESTOSTERONE 16.2 MG/G
4 GEL TRANSDERMAL DAILY
Qty: 120 ACTUATION | Refills: 2 | Status: SHIPPED | OUTPATIENT
Start: 2019-01-30 | End: 2019-07-22 | Stop reason: SDUPTHER

## 2019-06-20 ENCOUNTER — OFFICE VISIT (OUTPATIENT)
Dept: FAMILY MEDICINE CLINIC | Age: 59
End: 2019-06-20
Payer: COMMERCIAL

## 2019-06-20 VITALS
BODY MASS INDEX: 40.88 KG/M2 | HEIGHT: 71 IN | SYSTOLIC BLOOD PRESSURE: 130 MMHG | WEIGHT: 292 LBS | HEART RATE: 90 BPM | DIASTOLIC BLOOD PRESSURE: 80 MMHG | OXYGEN SATURATION: 96 %

## 2019-06-20 DIAGNOSIS — G47.33 SLEEP APNEA, OBSTRUCTIVE: Chronic | ICD-10-CM

## 2019-06-20 DIAGNOSIS — F32.A DEPRESSION, UNSPECIFIED DEPRESSION TYPE: ICD-10-CM

## 2019-06-20 DIAGNOSIS — R73.01 IMPAIRED FASTING GLUCOSE: ICD-10-CM

## 2019-06-20 DIAGNOSIS — I10 ESSENTIAL HYPERTENSION: Primary | Chronic | ICD-10-CM

## 2019-06-20 DIAGNOSIS — N52.9 ERECTILE DYSFUNCTION, UNSPECIFIED ERECTILE DYSFUNCTION TYPE: ICD-10-CM

## 2019-06-20 DIAGNOSIS — E34.9 HYPOTESTOSTERONISM: ICD-10-CM

## 2019-06-20 PROCEDURE — 99214 OFFICE O/P EST MOD 30 MIN: CPT | Performed by: NURSE PRACTITIONER

## 2019-06-20 RX ORDER — SILDENAFIL 50 MG/1
TABLET, FILM COATED ORAL
Qty: 30 TABLET | Refills: 1 | Status: ON HOLD | OUTPATIENT
Start: 2019-06-20 | End: 2019-07-03 | Stop reason: HOSPADM

## 2019-06-20 RX ORDER — BUPROPION HYDROCHLORIDE 150 MG/1
150 TABLET ORAL EVERY MORNING
Qty: 30 TABLET | Refills: 3 | Status: SHIPPED | OUTPATIENT
Start: 2019-06-20 | End: 2019-09-26 | Stop reason: SDUPTHER

## 2019-06-20 ASSESSMENT — PATIENT HEALTH QUESTIONNAIRE - PHQ9
SUM OF ALL RESPONSES TO PHQ QUESTIONS 1-9: 2
SUM OF ALL RESPONSES TO PHQ9 QUESTIONS 1 & 2: 2
SUM OF ALL RESPONSES TO PHQ QUESTIONS 1-9: 2
2. FEELING DOWN, DEPRESSED OR HOPELESS: 1
1. LITTLE INTEREST OR PLEASURE IN DOING THINGS: 1

## 2019-06-20 NOTE — PROGRESS NOTES
2019     Randolph Lizama (:  1960) is a 66 y.o. male, here for evaluation of the following medical concerns:    HPI  Pt presents to the clinic for a 6 month follow up of chronic medical conditions. Pt has a history of HTN which is stable at this time on his current medication. He has a history of IFG. He does not check his blood sugar on a routine basis. Pt has a history of hypotestosteronism for which he uses testosterone gel and viagra. He does need a refill of his viagra. He is due for labs. He states that he has a long history of depression and anxiety. Pt has not been on any medication for a period of time but thinks he would be improved with medication. Pt denies thoughts of suicide or homicide. Pt states that he has been on wellbutrin and has done well with this. He would like to restart the medication. He has a history of sleep apnea and uses a cpap with improvement of symptoms.    Past Medical History:   Diagnosis Date    Cervical disc disease     Depression     Diabetes mellitus ([address]     PREDIABETIC    Erectile dysfunction     Hepatitis C     tx 6mon completed  Dr David George TREATMENT    History of gastroesophageal reflux (GERD)     Hypertension     Hypotestosteronism     Impaired fasting glucose     Laceration 2017    TOP OF HEAD    Neck pain     Obesity     Sleep apnea     CPAP MACHINE    Substance abuse in remission ([address]     IV DRUG USER 30 YEARS AGO    Substance abuse in remission ([address]     ALCOHOLIC 20 YEARS    Testicular hypofunction     Wears glasses        Past Surgical History:   Procedure Laterality Date    CERVICAL FUSION  2017    C5-7    CERVICAL FUSION N/A 2017     ANTERIOR CERVICAL DECOMPRESSION FUSION C5-C7 performed by Alicja Gifford MD at [address] [place] [address]  2014    polyps    COLONOSCOPY  2015    sigmoid diverticulosis    HAMMER TOE SURGERY Left     2ND  AND 3RD TOE DR [facility]    LIVER BIOPSY      TYMPANIC MEMBRANE REPAIR      MULTIPLE AS A CHILD       Social History     Socioeconomic History    Marital status:      Spouse name: None    Number of children: None    Years of education: None    Highest education level: None   Occupational History    None   Social Needs    Financial resource strain: None    Food insecurity:     Worry: None     Inability: None    Transportation needs:     Medical: None     Non-medical: None   Tobacco Use    Smoking status: Former Smoker     Packs/day: 1.50     Years: 9.00     Pack years: 13.50     Types: Cigarettes     Last attempt to quit: 1992     Years since quittin.8    Smokeless tobacco: Former User     Quit date: 1990   Substance and Sexual Activity    Alcohol use: No     Alcohol/week: 0.0 oz     Comment: HISTORY OF ALCOHOL ABUSE 30 YEARS AGO quit     Drug use: No     Types: IV     Comment: HISTORY OF IV DRUG USE 30 YEARS AGO    Sexual activity: None   Lifestyle    Physical activity:     Days per week: None     Minutes per session: None    Stress: None   Relationships    Social connections:     Talks on phone: None     Gets together: None     Attends Jain service: None     Active member of club or organization: None     Attends meetings of clubs or organizations: None     Relationship status: None    Intimate partner violence:     Fear of current or ex partner: None     Emotionally abused: None     Physically abused: None     Forced sexual activity: None   Other Topics Concern    None   Social History Narrative    None       Family History   Problem Relation Age of Onset    High Blood Pressure Mother     Diabetes Mother     Cataracts Mother     Cancer Father     Stroke Father     Other Brother         HEPATITIS C    Diabetes Sister     Diabetes Brother     Glaucoma Neg Hx        Allergies   Allergen Reactions    Gabapentin      GI side effects    Seasonal Other (See Comments)     RUNNY NOSE, WATERY EYES Current Outpatient Medications   Medication Sig Dispense Refill    sildenafil (VIAGRA) 50 MG tablet TAKE 1 TABLET BY MOUTH AS NEEDED FOR  ERECTILE DYSFUNCTION 30 tablet 1    buPROPion (WELLBUTRIN XL) 150 MG extended release tablet Take 1 tablet by mouth every morning 30 tablet 3    Testosterone (ANDROGEL PUMP) 20.25 MG/ACT (1.62%) GEL gel Place 4 actuation onto the skin daily for 30 days. . 120 actuation 2    lisinopril-hydrochlorothiazide (PRINZIDE;ZESTORETIC) 20-25 MG per tablet Take 1 tablet by mouth daily 90 tablet 3    metFORMIN (GLUCOPHAGE) 500 MG tablet Take 1 tablet by mouth daily With a meal 90 tablet 3    therapeutic multivitamin-minerals (THERAGRAN-M) tablet Take 1 tablet by mouth daily. No current facility-administered medications for this visit. Review of Systems   Constitutional: Negative for activity change, appetite change and fever. Respiratory: Negative for cough, shortness of breath and wheezing. Cardiovascular: Negative for chest pain, palpitations and leg swelling. Gastrointestinal: Negative. Neurological: Negative for dizziness, light-headedness and headaches. Psychiatric/Behavioral:        Depression        Prior to Visit Medications    Medication Sig Taking? Authorizing Provider   sildenafil (VIAGRA) 50 MG tablet TAKE 1 TABLET BY MOUTH AS NEEDED FOR  ERECTILE DYSFUNCTION Yes SHAKIRA Byrd CNP   buPROPion (WELLBUTRIN XL) 150 MG extended release tablet Take 1 tablet by mouth every morning Yes SHAKIRA Byrd CNP   Testosterone (ANDROGEL PUMP) 20.25 MG/ACT (1.62%) GEL gel Place 4 actuation onto the skin daily for 30 days. Coral Walsh, DO   lisinopril-hydrochlorothiazide (PRINZIDE;ZESTORETIC) 20-25 MG per tablet Take 1 tablet by mouth daily  Earl Walsh, DO   metFORMIN (GLUCOPHAGE) 500 MG tablet Take 1 tablet by mouth daily With a meal  Lamont Morales, DO   therapeutic multivitamin-minerals (THERAGRAN-M) tablet Take 1 tablet by mouth daily. Historical Provider, MD        Social History     Tobacco Use    Smoking status: Former Smoker     Packs/day: 1.50     Years: 9.00     Pack years: 13.50     Types: Cigarettes     Last attempt to quit: 1992     Years since quittin.8    Smokeless tobacco: Former User     Quit date: 1990   Substance Use Topics    Alcohol use: No     Alcohol/week: 0.0 oz     Comment: HISTORY OF ALCOHOL ABUSE 30 YEARS AGO quit         Vitals:    19 1605   BP: 130/80   Site: Right Upper Arm   Position: Sitting   Cuff Size: Large Adult   Pulse: 90   SpO2: 96%   Weight: 292 lb (132.5 kg)   Height: 5' 11\" (1.803 m)     Estimated body mass index is 40.73 kg/m² as calculated from the following:    Height as of this encounter: 5' 11\" (1.803 m). Weight as of this encounter: 292 lb (132.5 kg). Physical Exam   Constitutional: He is oriented to person, place, and time. He appears well-developed and well-nourished. No distress. HENT:   Head: Normocephalic and atraumatic. Neck: Neck supple. Cardiovascular: Normal rate, regular rhythm and normal heart sounds. Pulmonary/Chest: Effort normal and breath sounds normal.   Neurological: He is alert and oriented to person, place, and time. Skin: Skin is warm and dry. Psychiatric: His mood appears not anxious. Cognition and memory are normal. He exhibits a depressed mood (mildly). He expresses no homicidal and no suicidal ideation. He expresses no suicidal plans and no homicidal plans. Nursing note and vitals reviewed. ASSESSMENT/PLAN:  1. Essential hypertension  Stable continue current medication  - Hemoglobin A1C; Future  - Comprehensive Metabolic Panel; Future  - Lipid Panel; Future    2. Erectile dysfunction, unspecified erectile dysfunction type    - sildenafil (VIAGRA) 50 MG tablet; TAKE 1 TABLET BY MOUTH AS NEEDED FOR  ERECTILE DYSFUNCTION  Dispense: 30 tablet; Refill: 1    3.  Hypotestosteronism    - sildenafil (VIAGRA) 50 MG

## 2019-06-23 ASSESSMENT — ENCOUNTER SYMPTOMS
GASTROINTESTINAL NEGATIVE: 1
COUGH: 0
WHEEZING: 0
SHORTNESS OF BREATH: 0

## 2019-06-30 ENCOUNTER — OFFICE VISIT (OUTPATIENT)
Dept: PRIMARY CARE CLINIC | Age: 59
End: 2019-06-30

## 2019-06-30 ENCOUNTER — HOSPITAL ENCOUNTER (OUTPATIENT)
Age: 59
Discharge: HOME | End: 2019-07-02
Payer: COMMERCIAL

## 2019-06-30 VITALS
OXYGEN SATURATION: 96 % | BODY MASS INDEX: 41 KG/M2 | DIASTOLIC BLOOD PRESSURE: 80 MMHG | WEIGHT: 294 LBS | HEART RATE: 80 BPM | SYSTOLIC BLOOD PRESSURE: 138 MMHG | TEMPERATURE: 98.4 F

## 2019-06-30 DIAGNOSIS — M79.89 PAIN AND SWELLING OF RIGHT LOWER LEG: Primary | ICD-10-CM

## 2019-06-30 DIAGNOSIS — M79.661 PAIN AND SWELLING OF RIGHT LOWER LEG: Primary | ICD-10-CM

## 2019-06-30 ASSESSMENT — ENCOUNTER SYMPTOMS: RESPIRATORY NEGATIVE: 1

## 2019-06-30 NOTE — PROGRESS NOTES
[facility]             [address] [facility], [address]                        Telephone [phone]             Fax [phone]     Fabiola Stroud  1960  UYK:[id number]   Date of visit:  6/30/2019    Subjective:    Fabiola Stroud is a 66 y.o.  male who presents to [facility] today (6/30/2019) for evaluation of:    Chief Complaint   Patient presents with    Leg Pain     R calf cramping. no SOB. Leg Pain    The incident occurred more than 1 week ago (X 3-4 weeks). There was no injury mechanism. The pain is present in the right leg (right calf). The quality of the pain is described as cramping (tight pressure). The pain is at a severity of 8/10. The pain has been constant since onset. Associated symptoms include an inability to bear weight. Pertinent negatives include no numbness or tingling. The symptoms are aggravated by weight bearing, palpation and movement. He has tried ice, elevation and NSAIDs (ibuprofen) for the symptoms. The treatment provided mild relief.        He has the following problem list:  Patient Active Problem List   Diagnosis    Essential hypertension    Depression    Hepatitis C    Erectile dysfunction    Morbid obesity ([address]    Hypotestosteronism    Cervical myelopathy (HCC)    Prediabetes    Sleep apnea, obstructive        Current medications are:  Current Outpatient Medications   Medication Sig Dispense Refill    sildenafil (VIAGRA) 50 MG tablet TAKE 1 TABLET BY MOUTH AS NEEDED FOR  ERECTILE DYSFUNCTION 30 tablet 1    buPROPion (WELLBUTRIN XL) 150 MG extended release tablet Take 1 tablet by mouth every morning 30 tablet 3    lisinopril-hydrochlorothiazide (PRINZIDE;ZESTORETIC) 20-25 MG per tablet Take 1 tablet by mouth daily 90 tablet 3    metFORMIN (GLUCOPHAGE) 500 MG tablet Take 1 tablet by mouth daily With a meal 90 tablet 3    therapeutic multivitamin-minerals [facility]) tablet Take 1 tablet by mouth daily.  Testosterone (ANDROGEL PUMP) 20.25 MG/ACT (1.62%) GEL gel Place 4 actuation onto the skin daily for 30 days. . 120 actuation 2     No current facility-administered medications for this visit. He is allergic to gabapentin and seasonal.. He  reports that he quit smoking about 26 years ago. His smoking use included cigarettes. He has a 13.50 pack-year smoking history. He quit smokeless tobacco use about 28 years ago. Objective:    Vitals:    06/30/19 1318   BP: 138/80   Pulse: 80   Temp: 98.4 °F (36.9 °C)   SpO2: 96%     Body mass index is 41 kg/m². Review of Systems   Constitutional: Negative. Respiratory: Negative. Cardiovascular: Positive for leg swelling (right foot, ankle, and lower leg swelling and pain). Neurological: Negative for tingling and numbness. Physical Exam   Constitutional: He is oriented to person, place, and time. He appears well-developed and well-nourished. HENT:   Head: Normocephalic. Eyes: Pupils are equal, round, and reactive to light. Neck: Normal range of motion. Neck supple. Cardiovascular: Normal rate, regular rhythm and normal heart sounds. Pulmonary/Chest: Effort normal and breath sounds normal.   Musculoskeletal:        Right lower leg: He exhibits tenderness and swelling. Legs:  Neurological: He is alert and oriented to person, place, and time. Skin: Skin is warm and dry. Psychiatric: He has a normal mood and affect. His behavior is normal. Thought content normal.   Nursing note and vitals reviewed. Assessment and Plan:    No results found for this visit on 06/30/19. Diagnosis Orders   1. Pain and swelling of right lower leg  VL DUP LOWER EXTREMITY VENOUS RIGHT     Due to ultra sound unavailable today, I instructed patient to go to [facility] ER and have someone drive him. No orders of the defined types were placed in this encounter.         Electronically signed by Toya Ruvalcaba Clovis Alvarez, SHAKIRA - CNP on 6/30/19 at 1:22 PM

## 2019-06-30 NOTE — PROGRESS NOTES
Pt was seen for a possible DVT due to positive Maninder's sign of R calf. On call sonographer is currently unable to be here for US. Advised that pt be taken to [facility] for further work up/US scan and that pt does not drive. Pt refused to have anybody come and pick him up and stated that he will drive to [facility] himself. Informed TANNER of process.

## 2019-07-01 ENCOUNTER — APPOINTMENT (OUTPATIENT)
Dept: CT IMAGING | Age: 59
DRG: 176 | End: 2019-07-01
Payer: COMMERCIAL

## 2019-07-01 ENCOUNTER — HOSPITAL ENCOUNTER (OUTPATIENT)
Dept: ULTRASOUND IMAGING | Age: 59
Discharge: HOME | DRG: 176 | End: 2019-07-03
Payer: COMMERCIAL

## 2019-07-01 ENCOUNTER — HOSPITAL ENCOUNTER (OUTPATIENT)
Dept: LAB | Age: 59
Discharge: HOME | DRG: 176 | End: 2019-07-01
Payer: COMMERCIAL

## 2019-07-01 ENCOUNTER — HOSPITAL ENCOUNTER (INPATIENT)
Age: 59
LOS: 2 days | Discharge: HOME | DRG: 176 | End: 2019-07-03
Attending: EMERGENCY MEDICINE | Admitting: INTERNAL MEDICINE
Payer: COMMERCIAL

## 2019-07-01 ENCOUNTER — TELEPHONE (OUTPATIENT)
Dept: PRIMARY CARE CLINIC | Age: 59
End: 2019-07-01

## 2019-07-01 DIAGNOSIS — M79.661 PAIN AND SWELLING OF RIGHT LOWER LEG: ICD-10-CM

## 2019-07-01 DIAGNOSIS — M79.89 PAIN AND SWELLING OF RIGHT LOWER LEG: ICD-10-CM

## 2019-07-01 DIAGNOSIS — I10 ESSENTIAL HYPERTENSION: Chronic | ICD-10-CM

## 2019-07-01 DIAGNOSIS — I26.99 ACUTE PULMONARY EMBOLISM WITHOUT ACUTE COR PULMONALE, UNSPECIFIED PULMONARY EMBOLISM TYPE (HCC): Primary | ICD-10-CM

## 2019-07-01 DIAGNOSIS — I82.411 ACUTE DEEP VEIN THROMBOSIS (DVT) OF FEMORAL VEIN OF RIGHT LOWER EXTREMITY (HCC): ICD-10-CM

## 2019-07-01 DIAGNOSIS — E34.9 HYPOTESTOSTERONISM: ICD-10-CM

## 2019-07-01 DIAGNOSIS — I82.411 ACUTE DEEP VEIN THROMBOSIS (DVT) OF FEMORAL VEIN OF RIGHT LOWER EXTREMITY (HCC): Primary | ICD-10-CM

## 2019-07-01 LAB
ABSOLUTE EOS #: 0.2 K/UL (ref 0–0.4)
ABSOLUTE IMMATURE GRANULOCYTE: ABNORMAL K/UL (ref 0–0.3)
ABSOLUTE LYMPH #: 1.5 K/UL (ref 1–4.8)
ABSOLUTE MONO #: 0.5 K/UL (ref 0.1–1.2)
ALBUMIN SERPL-MCNC: 4.2 G/DL (ref 3.5–5.2)
ALBUMIN/GLOBULIN RATIO: 1.3 (ref 1–2.5)
ALP BLD-CCNC: 77 U/L (ref 40–129)
ALT SERPL-CCNC: 63 U/L (ref 5–41)
ANION GAP SERPL CALCULATED.3IONS-SCNC: 12 MMOL/L (ref 9–17)
AST SERPL-CCNC: 43 U/L
BASOPHILS # BLD: 1 % (ref 0–2)
BASOPHILS ABSOLUTE: 0.1 K/UL (ref 0–0.2)
BILIRUB SERPL-MCNC: 0.62 MG/DL (ref 0.3–1.2)
BUN BLDV-MCNC: 11 MG/DL (ref 6–20)
BUN/CREAT BLD: 13 (ref 9–20)
CALCIUM SERPL-MCNC: 9.5 MG/DL (ref 8.6–10.4)
CHLORIDE BLD-SCNC: 102 MMOL/L (ref 98–107)
CHOLESTEROL/HDL RATIO: 5.9
CHOLESTEROL: 154 MG/DL
CO2: 28 MMOL/L (ref 20–31)
CREAT SERPL-MCNC: 0.88 MG/DL (ref 0.7–1.2)
DIFFERENTIAL TYPE: ABNORMAL
EKG ATRIAL RATE: 78 BPM
EKG P AXIS: 31 DEGREES
EKG P-R INTERVAL: 164 MS
EKG Q-T INTERVAL: 380 MS
EKG QRS DURATION: 112 MS
EKG QTC CALCULATION (BAZETT): 433 MS
EKG R AXIS: 38 DEGREES
EKG T AXIS: 12 DEGREES
EKG VENTRICULAR RATE: 78 BPM
EOSINOPHILS RELATIVE PERCENT: 3 % (ref 1–8)
ESTIMATED AVERAGE GLUCOSE: 148 MG/DL
GFR AFRICAN AMERICAN: >60 ML/MIN
GFR NON-AFRICAN AMERICAN: >60 ML/MIN
GFR SERPL CREATININE-BSD FRML MDRD: ABNORMAL ML/MIN/{1.73_M2}
GFR SERPL CREATININE-BSD FRML MDRD: ABNORMAL ML/MIN/{1.73_M2}
GLUCOSE BLD-MCNC: 115 MG/DL (ref 75–110)
GLUCOSE BLD-MCNC: 128 MG/DL (ref 75–110)
GLUCOSE BLD-MCNC: 155 MG/DL (ref 70–99)
GLUCOSE BLD-MCNC: 93 MG/DL (ref 75–110)
HBA1C MFR BLD: 6.8 % (ref 4.8–5.9)
HCT VFR BLD CALC: 48.7 % (ref 41–53)
HDLC SERPL-MCNC: 26 MG/DL
HEMOGLOBIN: 16.3 G/DL (ref 13.5–17.5)
IMMATURE GRANULOCYTES: ABNORMAL %
INR BLD: 1
LDL CHOLESTEROL: 91 MG/DL (ref 0–130)
LYMPHOCYTES # BLD: 22 % (ref 15–43)
MCH RBC QN AUTO: 29 PG (ref 26–34)
MCHC RBC AUTO-ENTMCNC: 33.5 G/DL (ref 31–37)
MCV RBC AUTO: 86.6 FL (ref 80–100)
MONOCYTES # BLD: 7 % (ref 6–14)
NRBC AUTOMATED: ABNORMAL PER 100 WBC
PARTIAL THROMBOPLASTIN TIME: 28.6 SEC (ref 27–35)
PDW BLD-RTO: 15.3 % (ref 11–14.5)
PLATELET # BLD: 158 K/UL (ref 140–450)
PLATELET ESTIMATE: ABNORMAL
PMV BLD AUTO: 7 FL (ref 6–12)
POTASSIUM SERPL-SCNC: 4 MMOL/L (ref 3.7–5.3)
PROTHROMBIN TIME: 10.2 SEC (ref 9.4–11.3)
RBC # BLD: 5.62 M/UL (ref 4.5–5.9)
RBC # BLD: ABNORMAL 10*6/UL
SEG NEUTROPHILS: 67 % (ref 44–74)
SEGMENTED NEUTROPHILS ABSOLUTE COUNT: 4.7 K/UL (ref 1.8–7.7)
SEX HORMONE BINDING GLOBULIN: 26 NMOL/L (ref 11–80)
SODIUM BLD-SCNC: 142 MMOL/L (ref 135–144)
TESTOSTERONE FREE-NONMALE: 167.2 PG/ML (ref 47–244)
TESTOSTERONE TOTAL: 667 NG/DL (ref 220–1000)
TOTAL PROTEIN: 7.4 G/DL (ref 6.4–8.3)
TRIGL SERPL-MCNC: 184 MG/DL
TROPONIN INTERP: NORMAL
TROPONIN T: NORMAL NG/ML
TROPONIN, HIGH SENSITIVITY: 7 NG/L (ref 0–22)
TROPONIN, HIGH SENSITIVITY: 8 NG/L (ref 0–22)
TROPONIN, HIGH SENSITIVITY: 9 NG/L (ref 0–22)
VLDLC SERPL CALC-MCNC: ABNORMAL MG/DL (ref 1–30)
WBC # BLD: 7 K/UL (ref 3.5–11)
WBC # BLD: ABNORMAL 10*3/UL

## 2019-07-01 PROCEDURE — 99223 1ST HOSP IP/OBS HIGH 75: CPT | Performed by: INTERNAL MEDICINE

## 2019-07-01 PROCEDURE — 84484 ASSAY OF TROPONIN QUANT: CPT

## 2019-07-01 PROCEDURE — 85610 PROTHROMBIN TIME: CPT

## 2019-07-01 PROCEDURE — 80061 LIPID PANEL: CPT

## 2019-07-01 PROCEDURE — 6360000004 HC RX CONTRAST MEDICATION: Performed by: EMERGENCY MEDICINE

## 2019-07-01 PROCEDURE — 80053 COMPREHEN METABOLIC PANEL: CPT

## 2019-07-01 PROCEDURE — 6370000000 HC RX 637 (ALT 250 FOR IP): Performed by: INTERNAL MEDICINE

## 2019-07-01 PROCEDURE — 84403 ASSAY OF TOTAL TESTOSTERONE: CPT

## 2019-07-01 PROCEDURE — 71260 CT THORAX DX C+: CPT

## 2019-07-01 PROCEDURE — 99285 EMERGENCY DEPT VISIT HI MDM: CPT

## 2019-07-01 PROCEDURE — 85025 COMPLETE CBC W/AUTO DIFF WBC: CPT

## 2019-07-01 PROCEDURE — 93005 ELECTROCARDIOGRAM TRACING: CPT | Performed by: EMERGENCY MEDICINE

## 2019-07-01 PROCEDURE — 2060000000 HC ICU INTERMEDIATE R&B

## 2019-07-01 PROCEDURE — 85730 THROMBOPLASTIN TIME PARTIAL: CPT

## 2019-07-01 PROCEDURE — 83036 HEMOGLOBIN GLYCOSYLATED A1C: CPT

## 2019-07-01 PROCEDURE — 6360000002 HC RX W HCPCS: Performed by: EMERGENCY MEDICINE

## 2019-07-01 PROCEDURE — 36415 COLL VENOUS BLD VENIPUNCTURE: CPT

## 2019-07-01 PROCEDURE — 2580000003 HC RX 258: Performed by: INTERNAL MEDICINE

## 2019-07-01 PROCEDURE — 94760 N-INVAS EAR/PLS OXIMETRY 1: CPT

## 2019-07-01 PROCEDURE — 82947 ASSAY GLUCOSE BLOOD QUANT: CPT

## 2019-07-01 PROCEDURE — 93971 EXTREMITY STUDY: CPT

## 2019-07-01 PROCEDURE — 6360000002 HC RX W HCPCS: Performed by: INTERNAL MEDICINE

## 2019-07-01 PROCEDURE — 84270 ASSAY OF SEX HORMONE GLOBUL: CPT

## 2019-07-01 RX ORDER — LISINOPRIL 20 MG/1
20 TABLET ORAL DAILY
Status: DISCONTINUED | OUTPATIENT
Start: 2019-07-01 | End: 2019-07-03 | Stop reason: HOSPADM

## 2019-07-01 RX ORDER — SODIUM CHLORIDE 0.9 % (FLUSH) 0.9 %
10 SYRINGE (ML) INJECTION EVERY 12 HOURS SCHEDULED
Status: DISCONTINUED | OUTPATIENT
Start: 2019-07-01 | End: 2019-07-03 | Stop reason: HOSPADM

## 2019-07-01 RX ORDER — SODIUM CHLORIDE 0.9 % (FLUSH) 0.9 %
10 SYRINGE (ML) INJECTION PRN
Status: DISCONTINUED | OUTPATIENT
Start: 2019-07-01 | End: 2019-07-03 | Stop reason: HOSPADM

## 2019-07-01 RX ORDER — HYDROCHLOROTHIAZIDE 25 MG/1
25 TABLET ORAL DAILY
Status: DISCONTINUED | OUTPATIENT
Start: 2019-07-01 | End: 2019-07-03 | Stop reason: HOSPADM

## 2019-07-01 RX ORDER — DEXTROSE MONOHYDRATE 50 MG/ML
100 INJECTION, SOLUTION INTRAVENOUS PRN
Status: DISCONTINUED | OUTPATIENT
Start: 2019-07-01 | End: 2019-07-03 | Stop reason: HOSPADM

## 2019-07-01 RX ORDER — DEXTROSE MONOHYDRATE 25 G/50ML
12.5 INJECTION, SOLUTION INTRAVENOUS PRN
Status: DISCONTINUED | OUTPATIENT
Start: 2019-07-01 | End: 2019-07-03 | Stop reason: HOSPADM

## 2019-07-01 RX ORDER — NICOTINE POLACRILEX 4 MG
15 LOZENGE BUCCAL PRN
Status: DISCONTINUED | OUTPATIENT
Start: 2019-07-01 | End: 2019-07-03 | Stop reason: HOSPADM

## 2019-07-01 RX ORDER — ONDANSETRON 2 MG/ML
4 INJECTION INTRAMUSCULAR; INTRAVENOUS EVERY 6 HOURS PRN
Status: DISCONTINUED | OUTPATIENT
Start: 2019-07-01 | End: 2019-07-03 | Stop reason: HOSPADM

## 2019-07-01 RX ORDER — BUPROPION HYDROCHLORIDE 150 MG/1
150 TABLET ORAL EVERY MORNING
Status: DISCONTINUED | OUTPATIENT
Start: 2019-07-01 | End: 2019-07-03 | Stop reason: HOSPADM

## 2019-07-01 RX ORDER — ACETAMINOPHEN 325 MG/1
650 TABLET ORAL EVERY 4 HOURS PRN
Status: DISCONTINUED | OUTPATIENT
Start: 2019-07-01 | End: 2019-07-03 | Stop reason: HOSPADM

## 2019-07-01 RX ORDER — LISINOPRIL AND HYDROCHLOROTHIAZIDE 25; 20 MG/1; MG/1
1 TABLET ORAL DAILY
Status: DISCONTINUED | OUTPATIENT
Start: 2019-07-01 | End: 2019-07-01 | Stop reason: CLARIF

## 2019-07-01 RX ORDER — HYDROCODONE BITARTRATE AND ACETAMINOPHEN 5; 325 MG/1; MG/1
1 TABLET ORAL EVERY 4 HOURS PRN
Status: DISCONTINUED | OUTPATIENT
Start: 2019-07-01 | End: 2019-07-03 | Stop reason: HOSPADM

## 2019-07-01 RX ORDER — MULTIVITAMIN WITH FOLIC ACID 400 MCG
1 TABLET ORAL DAILY
Status: DISCONTINUED | OUTPATIENT
Start: 2019-07-01 | End: 2019-07-03 | Stop reason: HOSPADM

## 2019-07-01 RX ADMIN — SODIUM CHLORIDE, PRESERVATIVE FREE 10 ML: 5 INJECTION INTRAVENOUS at 21:26

## 2019-07-01 RX ADMIN — HYDROCHLOROTHIAZIDE 25 MG: 25 TABLET ORAL at 12:30

## 2019-07-01 RX ADMIN — IOPAMIDOL 100 ML: 755 INJECTION, SOLUTION INTRAVENOUS at 10:09

## 2019-07-01 RX ADMIN — ACETAMINOPHEN 650 MG: 325 TABLET ORAL at 19:37

## 2019-07-01 RX ADMIN — ENOXAPARIN SODIUM 100 MG: 100 INJECTION SUBCUTANEOUS at 10:20

## 2019-07-01 RX ADMIN — ENOXAPARIN SODIUM 135 MG: 150 INJECTION SUBCUTANEOUS at 21:25

## 2019-07-01 RX ADMIN — LISINOPRIL 20 MG: 20 TABLET ORAL at 12:30

## 2019-07-01 RX ADMIN — BUPROPION HYDROCHLORIDE 150 MG: 150 TABLET, EXTENDED RELEASE ORAL at 12:30

## 2019-07-01 ASSESSMENT — PAIN DESCRIPTION - FREQUENCY
FREQUENCY: CONTINUOUS

## 2019-07-01 ASSESSMENT — PAIN DESCRIPTION - ONSET
ONSET: ON-GOING

## 2019-07-01 ASSESSMENT — PAIN DESCRIPTION - PAIN TYPE
TYPE: ACUTE PAIN

## 2019-07-01 ASSESSMENT — PAIN DESCRIPTION - ORIENTATION
ORIENTATION: RIGHT
ORIENTATION: RIGHT
ORIENTATION: RIGHT;LOWER

## 2019-07-01 ASSESSMENT — PAIN DESCRIPTION - PROGRESSION: CLINICAL_PROGRESSION: NOT CHANGED

## 2019-07-01 ASSESSMENT — PAIN DESCRIPTION - LOCATION
LOCATION: LEG

## 2019-07-01 ASSESSMENT — PAIN SCALES - GENERAL
PAINLEVEL_OUTOF10: 5
PAINLEVEL_OUTOF10: 3
PAINLEVEL_OUTOF10: 4

## 2019-07-01 ASSESSMENT — PAIN - FUNCTIONAL ASSESSMENT: PAIN_FUNCTIONAL_ASSESSMENT: PREVENTS OR INTERFERES SOME ACTIVE ACTIVITIES AND ADLS

## 2019-07-01 ASSESSMENT — PAIN DESCRIPTION - DESCRIPTORS
DESCRIPTORS: ACHING;TIGHTNESS;CRAMPING
DESCRIPTORS: TIGHTNESS
DESCRIPTORS: ACHING

## 2019-07-01 NOTE — TELEPHONE ENCOUNTER
I called patient's cell number and wife answered. She verbalized that he was currently in [facility] and being seen and treated for the DVT.

## 2019-07-01 NOTE — ED PROVIDER NOTES
[address] [facility] ED  [place] [address]  Phone: [phone]      Pt Name: Fadumo Patricio  C  Leanne 1960  Date of evaluation: 2019      CHIEF COMPLAINT       Chief Complaint   Patient presents with    Other     DVT, right leg, had out patient ultrasound this morning and was brought to ED from there       HISTORY OF PRESENT ILLNESS   65-year old male presents to the emergency department today concerned of right leg calf pain and swelling which she describes as a tightness over the past 3 to 4 weeks. He went to urgent care yesterday and they wanted to get an ultrasound but because of issues, that could not be obtained. They written for an ultrasound to be done this morning. In the meantime patient went to [facility] and had a Lovenox injection. He presented this morning had his ultrasound which was positive for DVT and was immediately brought here. Does report a little bit of shortness of breath minor rating and exertional.  There is an extensive family history of thromboembolic events. He denies chest pain. Pain in his leg on a scale 0 times a 5. It does radiate up sometimes more proximally and sometimes it bruised remains more distally. No recent long car rides or plane trips anywhere. No cancer history. Is been no other contemporaneous evaluation or management of the symptoms prior to arrival.    REVIEW OF SYSTEMS     Review of Systems   All other systems reviewed and are negative. PAST MEDICAL HISTORY    has a past medical history of Cervical disc disease, Depression, Diabetes mellitus ([address] Erectile dysfunction, Hepatitis C, History of gastroesophageal reflux (GERD), Hypertension, Hypotestosteronism, Impaired fasting glucose, Laceration, Neck pain, Obesity, Sleep apnea, Substance abuse in remission [facility]), Substance abuse in remission [facility]), Testicular hypofunction, and Wears glasses.     SURGICAL HISTORY      has a past surgical history that includes DUPLEX VENOUS ULTRASOUND OF THE RIGHT LOWER EXTREMITY, 7/1/2019 8:08 am TECHNIQUE: Duplex ultrasound and Doppler images were obtained of the right lower extremity. COMPARISON: None. HISTORY: ORDERING SYSTEM PROVIDED HISTORY: Pain and swelling of right lower leg TECHNOLOGIST PROVIDED HISTORY: r/o DVT right lower leg swelling and pain for 3-4 weeks Ordering Physician Provided Reason for Exam: right leg swelling and pain Acuity: Acute Type of Exam: Initial FINDINGS: The common femoral vein is widely patent. Acute deep venous thrombosis within the right femoral vein, popliteal vein, as well as calf veins. There is thrombus within the right gastrocnemius vein. Acute deep venous thrombosis within the right femoral, popliteal, and gastrocnemius veins. Critical results were called by Dr. Linda Kerr MD to [facility] on 7/1/2019 at 9:01 a.m..          LABS:  Results for orders placed or performed during the hospital encounter of 07/01/19   CBC Auto Differential   Result Value Ref Range    WBC 7.0 3.5 - 11.0 k/uL    RBC 5.62 4.5 - 5.9 m/uL    Hemoglobin 16.3 13.5 - 17.5 g/dL    Hematocrit 48.7 41 - 53 %    MCV 86.6 80 - 100 fL    MCH 29.0 26 - 34 pg    MCHC 33.5 31 - 37 g/dL    RDW 15.3 (H) 11.0 - 14.5 %    Platelets 494 306 - 341 k/uL    MPV 7.0 6.0 - 12.0 fL    NRBC Automated NOT REPORTED per 100 WBC    Differential Type NOT REPORTED     Immature Granulocytes NOT REPORTED 0 %    Absolute Immature Granulocyte NOT REPORTED 0.00 - 0.30 k/uL    WBC Morphology NOT REPORTED     RBC Morphology NOT REPORTED     Platelet Estimate NOT REPORTED     Seg Neutrophils 67 44 - 74 %    Lymphocytes 22 15 - 43 %    Monocytes 7 6 - 14 %    Eosinophils % 3 1 - 8 %    Basophils 1 0 - 2 %    Segs Absolute 4.70 1.8 - 7.7 k/uL    Absolute Lymph # 1.50 1.0 - 4.8 k/uL    Absolute Mono # 0.50 0.1 - 1.2 k/uL    Absolute Eos # 0.20 0.0 - 0.4 k/uL    Basophils # 0.10 0.0 - 0.2 k/uL   Protime-INR   Result Value Ref Range    Protime

## 2019-07-01 NOTE — H&P
3RD TOE DR ROLLING [facility] 12/20/12    LIVER BIOPSY      TYMPANIC MEMBRANE REPAIR      MULTIPLE AS A CHILD       Medications Prior to Admission:    Medications Prior to Admission: buPROPion (WELLBUTRIN XL) 150 MG extended release tablet, Take 1 tablet by mouth every morning  Testosterone (ANDROGEL PUMP) 20.25 MG/ACT (1.62%) GEL gel, Place 4 actuation onto the skin daily for 30 days. Veronia Nathen lisinopril-hydrochlorothiazide (PRINZIDE;ZESTORETIC) 20-25 MG per tablet, Take 1 tablet by mouth daily  metFORMIN (GLUCOPHAGE) 500 MG tablet, Take 1 tablet by mouth daily With a meal  therapeutic multivitamin-minerals (THERAGRAN-M) tablet, Take 1 tablet by mouth daily. sildenafil (VIAGRA) 50 MG tablet, TAKE 1 TABLET BY MOUTH AS NEEDED FOR  ERECTILE DYSFUNCTION    Allergies:    Gabapentin and Seasonal    Social History:    reports that he quit smoking about 26 years ago. His smoking use included cigarettes. He has a 13.50 pack-year smoking history. He quit smokeless tobacco use about 28 years ago. He reports that he does not drink alcohol or use drugs. Family History:   family history includes Cancer in his father; Cataracts in his mother; Diabetes in his brother, mother, and sister; High Blood Pressure in his mother; Other in his brother; Stroke in his father. REVIEW OF SYSTEMS:  See HPI and problem list; otherwise no other new complaints with respect to HEENT, neck, pulmonary, coronary, GI, , endocrine, musculoskeletal, immune system/connective tissue disease, hematologic, neuropsych, skin, lymphatics, or malignancies.      PHYSICAL EXAM:  Vitals:  BP (!) 161/90   Pulse 75   Temp 97.5 °F (36.4 °C) (Oral)   Resp 16   Ht 5' 11\" (1.803 m)   Wt 291 lb 6.4 oz (132.2 kg)   SpO2 98%   BMI 40.64 kg/m²     HEENT: Normocephalic and Atraumatic  Neck: Supple, No Bruits, No Masses, Tenderness, Nodularity and No Lymphadenopathy  Chest/Lungs: Clear to Auscultation without Rales, Rhonchi, or Wheezes  Cardiac: Regular Rate and

## 2019-07-02 LAB
ABSOLUTE EOS #: 0.2 K/UL (ref 0–0.4)
ABSOLUTE IMMATURE GRANULOCYTE: ABNORMAL K/UL (ref 0–0.3)
ABSOLUTE LYMPH #: 1.9 K/UL (ref 1–4.8)
ABSOLUTE MONO #: 0.5 K/UL (ref 0.1–1.2)
ANION GAP SERPL CALCULATED.3IONS-SCNC: 13 MMOL/L (ref 9–17)
BASOPHILS # BLD: 1 % (ref 0–2)
BASOPHILS ABSOLUTE: 0.1 K/UL (ref 0–0.2)
BUN BLDV-MCNC: 12 MG/DL (ref 6–20)
BUN/CREAT BLD: 15 (ref 9–20)
CALCIUM SERPL-MCNC: 9.4 MG/DL (ref 8.6–10.4)
CHLORIDE BLD-SCNC: 100 MMOL/L (ref 98–107)
CO2: 27 MMOL/L (ref 20–31)
CREAT SERPL-MCNC: 0.79 MG/DL (ref 0.7–1.2)
DIFFERENTIAL TYPE: ABNORMAL
EOSINOPHILS RELATIVE PERCENT: 3 % (ref 1–8)
GFR AFRICAN AMERICAN: >60 ML/MIN
GFR NON-AFRICAN AMERICAN: >60 ML/MIN
GFR SERPL CREATININE-BSD FRML MDRD: ABNORMAL ML/MIN/{1.73_M2}
GFR SERPL CREATININE-BSD FRML MDRD: ABNORMAL ML/MIN/{1.73_M2}
GLUCOSE BLD-MCNC: 130 MG/DL (ref 75–110)
GLUCOSE BLD-MCNC: 137 MG/DL (ref 70–99)
GLUCOSE BLD-MCNC: 180 MG/DL (ref 75–110)
GLUCOSE BLD-MCNC: 183 MG/DL (ref 75–110)
HCT VFR BLD CALC: 48.7 % (ref 41–53)
HEMOGLOBIN: 16.3 G/DL (ref 13.5–17.5)
IMMATURE GRANULOCYTES: ABNORMAL %
LYMPHOCYTES # BLD: 26 % (ref 15–43)
MCH RBC QN AUTO: 29 PG (ref 26–34)
MCHC RBC AUTO-ENTMCNC: 33.5 G/DL (ref 31–37)
MCV RBC AUTO: 86.6 FL (ref 80–100)
MONOCYTES # BLD: 8 % (ref 6–14)
NRBC AUTOMATED: ABNORMAL PER 100 WBC
PDW BLD-RTO: 14.8 % (ref 11–14.5)
PLATELET # BLD: 173 K/UL (ref 140–450)
PLATELET ESTIMATE: ABNORMAL
PMV BLD AUTO: 7 FL (ref 6–12)
POTASSIUM SERPL-SCNC: 4 MMOL/L (ref 3.7–5.3)
RBC # BLD: 5.63 M/UL (ref 4.5–5.9)
RBC # BLD: ABNORMAL 10*6/UL
SEG NEUTROPHILS: 62 % (ref 44–74)
SEGMENTED NEUTROPHILS ABSOLUTE COUNT: 4.5 K/UL (ref 1.8–7.7)
SODIUM BLD-SCNC: 140 MMOL/L (ref 135–144)
TROPONIN INTERP: NORMAL
TROPONIN T: NORMAL NG/ML
TROPONIN, HIGH SENSITIVITY: 8 NG/L (ref 0–22)
WBC # BLD: 7.2 K/UL (ref 3.5–11)
WBC # BLD: ABNORMAL 10*3/UL

## 2019-07-02 PROCEDURE — 2580000003 HC RX 258: Performed by: INTERNAL MEDICINE

## 2019-07-02 PROCEDURE — 85025 COMPLETE CBC W/AUTO DIFF WBC: CPT

## 2019-07-02 PROCEDURE — 94760 N-INVAS EAR/PLS OXIMETRY 1: CPT

## 2019-07-02 PROCEDURE — 82947 ASSAY GLUCOSE BLOOD QUANT: CPT

## 2019-07-02 PROCEDURE — 80048 BASIC METABOLIC PNL TOTAL CA: CPT

## 2019-07-02 PROCEDURE — 2060000000 HC ICU INTERMEDIATE R&B

## 2019-07-02 PROCEDURE — 36415 COLL VENOUS BLD VENIPUNCTURE: CPT

## 2019-07-02 PROCEDURE — 6370000000 HC RX 637 (ALT 250 FOR IP): Performed by: INTERNAL MEDICINE

## 2019-07-02 PROCEDURE — 6360000002 HC RX W HCPCS: Performed by: INTERNAL MEDICINE

## 2019-07-02 PROCEDURE — 84484 ASSAY OF TROPONIN QUANT: CPT

## 2019-07-02 PROCEDURE — 99232 SBSQ HOSP IP/OBS MODERATE 35: CPT | Performed by: INTERNAL MEDICINE

## 2019-07-02 RX ADMIN — THERA TABS 1 TABLET: TAB at 11:07

## 2019-07-02 RX ADMIN — BUPROPION HYDROCHLORIDE 150 MG: 150 TABLET, EXTENDED RELEASE ORAL at 07:56

## 2019-07-02 RX ADMIN — HYDROCHLOROTHIAZIDE 25 MG: 25 TABLET ORAL at 07:56

## 2019-07-02 RX ADMIN — SODIUM CHLORIDE, PRESERVATIVE FREE 10 ML: 5 INJECTION INTRAVENOUS at 20:29

## 2019-07-02 RX ADMIN — SODIUM CHLORIDE, PRESERVATIVE FREE 10 ML: 5 INJECTION INTRAVENOUS at 10:08

## 2019-07-02 RX ADMIN — HYDROCODONE BITARTRATE AND ACETAMINOPHEN 1 TABLET: 5; 325 TABLET ORAL at 10:07

## 2019-07-02 RX ADMIN — LISINOPRIL 20 MG: 20 TABLET ORAL at 07:56

## 2019-07-02 RX ADMIN — ENOXAPARIN SODIUM 135 MG: 150 INJECTION SUBCUTANEOUS at 07:56

## 2019-07-02 RX ADMIN — ACETAMINOPHEN 650 MG: 325 TABLET ORAL at 03:14

## 2019-07-02 RX ADMIN — ENOXAPARIN SODIUM 135 MG: 150 INJECTION SUBCUTANEOUS at 20:29

## 2019-07-02 RX ADMIN — ACETAMINOPHEN 650 MG: 325 TABLET ORAL at 20:29

## 2019-07-02 ASSESSMENT — PAIN SCALES - GENERAL
PAINLEVEL_OUTOF10: 6
PAINLEVEL_OUTOF10: 3

## 2019-07-02 ASSESSMENT — PAIN DESCRIPTION - LOCATION: LOCATION: LEG

## 2019-07-02 ASSESSMENT — PAIN DESCRIPTION - ORIENTATION: ORIENTATION: RIGHT;LOWER

## 2019-07-02 ASSESSMENT — PAIN DESCRIPTION - PAIN TYPE: TYPE: ACUTE PAIN

## 2019-07-02 NOTE — PROGRESS NOTES
Hospitalist Progress Note    Patient:  Shree Atkins     YOB: 1960    MRN: [id number]   Admit date: 7/1/2019     Acct: [de-identified]     PCP: Ford Jimenez DO    CC--Interval History: Bilateral pulmonary embolus----RLE DVT extensive---on Lovenox---decreased swelling and tenderness RLE, but still markedly greater diameter than LLE---no increased shortness of breath---no hemoptysus    HTN---/80    Prediabetes---BG = 137    See below    All other ROS negative except noted in HPI    Diet:  DIET CARB CONTROL;    Medications:  Scheduled Meds:   enoxaparin  1 mg/kg Subcutaneous BID    buPROPion  150 mg Oral QAM    multivitamin  1 tablet Oral Daily    insulin lispro  0-6 Units Subcutaneous TID WC    insulin lispro  0-3 Units Subcutaneous Nightly    sodium chloride flush  10 mL Intravenous 2 times per day    lisinopril  20 mg Oral Daily    And    hydrochlorothiazide  25 mg Oral Daily     Continuous Infusions:   dextrose       PRN Meds:glucose, dextrose, glucagon (rDNA), dextrose, sodium chloride flush, magnesium hydroxide, ondansetron, acetaminophen, HYDROcodone 5 mg - acetaminophen    Objective:  Labs:  CBC with Differential:    Lab Results   Component Value Date    WBC 7.2 07/02/2019    RBC 5.63 07/02/2019    HGB 16.3 07/02/2019    HCT 48.7 07/02/2019     07/02/2019    MCV 86.6 07/02/2019    MCH 29.0 07/02/2019    MCHC 33.5 07/02/2019    RDW 14.8 07/02/2019    LYMPHOPCT 26 07/02/2019    MONOPCT 8 07/02/2019    BASOPCT 1 07/02/2019    MONOSABS 0.50 07/02/2019    LYMPHSABS 1.90 07/02/2019    EOSABS 0.20 07/02/2019    BASOSABS 0.10 07/02/2019    DIFFTYPE NOT REPORTED 07/02/2019     BMP:    Lab Results   Component Value Date     07/02/2019    K 4.0 07/02/2019     07/02/2019    CO2 27 07/02/2019    BUN 12 07/02/2019    LABALBU 4.2 07/01/2019    CREATININE 0.79 07/02/2019    CALCIUM 9.4 07/02/2019    GFRAA >60 07/02/2019    LABGLOM >60 07/02/2019    GLUCOSE 137 07/02/2019

## 2019-07-03 ENCOUNTER — TELEPHONE (OUTPATIENT)
Dept: FAMILY MEDICINE CLINIC | Age: 59
End: 2019-07-03

## 2019-07-03 VITALS
SYSTOLIC BLOOD PRESSURE: 137 MMHG | TEMPERATURE: 97.9 F | WEIGHT: 287 LBS | OXYGEN SATURATION: 93 % | RESPIRATION RATE: 18 BRPM | HEIGHT: 71 IN | HEART RATE: 67 BPM | BODY MASS INDEX: 40.18 KG/M2 | DIASTOLIC BLOOD PRESSURE: 67 MMHG

## 2019-07-03 LAB
ABSOLUTE EOS #: 0.1 K/UL (ref 0–0.4)
ABSOLUTE IMMATURE GRANULOCYTE: ABNORMAL K/UL (ref 0–0.3)
ABSOLUTE LYMPH #: 1.5 K/UL (ref 1–4.8)
ABSOLUTE MONO #: 0.6 K/UL (ref 0.1–1.2)
ANION GAP SERPL CALCULATED.3IONS-SCNC: 12 MMOL/L (ref 9–17)
BASOPHILS # BLD: 1 % (ref 0–2)
BASOPHILS ABSOLUTE: 0.1 K/UL (ref 0–0.2)
BUN BLDV-MCNC: 14 MG/DL (ref 6–20)
BUN/CREAT BLD: 16 (ref 9–20)
CALCIUM SERPL-MCNC: 9.6 MG/DL (ref 8.6–10.4)
CHLORIDE BLD-SCNC: 98 MMOL/L (ref 98–107)
CO2: 28 MMOL/L (ref 20–31)
CREAT SERPL-MCNC: 0.89 MG/DL (ref 0.7–1.2)
DIFFERENTIAL TYPE: ABNORMAL
EOSINOPHILS RELATIVE PERCENT: 2 % (ref 1–8)
GFR AFRICAN AMERICAN: >60 ML/MIN
GFR NON-AFRICAN AMERICAN: >60 ML/MIN
GFR SERPL CREATININE-BSD FRML MDRD: ABNORMAL ML/MIN/{1.73_M2}
GFR SERPL CREATININE-BSD FRML MDRD: ABNORMAL ML/MIN/{1.73_M2}
GLUCOSE BLD-MCNC: 140 MG/DL (ref 70–99)
GLUCOSE BLD-MCNC: 149 MG/DL (ref 75–110)
HCT VFR BLD CALC: 48.9 % (ref 41–53)
HEMOGLOBIN: 16.4 G/DL (ref 13.5–17.5)
IMMATURE GRANULOCYTES: ABNORMAL %
LYMPHOCYTES # BLD: 24 % (ref 15–43)
MCH RBC QN AUTO: 29.1 PG (ref 26–34)
MCHC RBC AUTO-ENTMCNC: 33.5 G/DL (ref 31–37)
MCV RBC AUTO: 86.8 FL (ref 80–100)
MONOCYTES # BLD: 9 % (ref 6–14)
NRBC AUTOMATED: ABNORMAL PER 100 WBC
PDW BLD-RTO: 14.7 % (ref 11–14.5)
PLATELET # BLD: 162 K/UL (ref 140–450)
PLATELET ESTIMATE: ABNORMAL
PMV BLD AUTO: 8 FL (ref 6–12)
POTASSIUM SERPL-SCNC: 4 MMOL/L (ref 3.7–5.3)
RBC # BLD: 5.64 M/UL (ref 4.5–5.9)
RBC # BLD: ABNORMAL 10*6/UL
SEG NEUTROPHILS: 64 % (ref 44–74)
SEGMENTED NEUTROPHILS ABSOLUTE COUNT: 4.2 K/UL (ref 1.8–7.7)
SODIUM BLD-SCNC: 138 MMOL/L (ref 135–144)
WBC # BLD: 6.5 K/UL (ref 3.5–11)
WBC # BLD: ABNORMAL 10*3/UL

## 2019-07-03 PROCEDURE — 99238 HOSP IP/OBS DSCHRG MGMT 30/<: CPT | Performed by: INTERNAL MEDICINE

## 2019-07-03 PROCEDURE — 6370000000 HC RX 637 (ALT 250 FOR IP): Performed by: INTERNAL MEDICINE

## 2019-07-03 PROCEDURE — 94760 N-INVAS EAR/PLS OXIMETRY 1: CPT

## 2019-07-03 PROCEDURE — 82947 ASSAY GLUCOSE BLOOD QUANT: CPT

## 2019-07-03 PROCEDURE — 80048 BASIC METABOLIC PNL TOTAL CA: CPT

## 2019-07-03 PROCEDURE — 6370000000 HC RX 637 (ALT 250 FOR IP)

## 2019-07-03 PROCEDURE — 36415 COLL VENOUS BLD VENIPUNCTURE: CPT

## 2019-07-03 PROCEDURE — 6360000002 HC RX W HCPCS: Performed by: INTERNAL MEDICINE

## 2019-07-03 PROCEDURE — 2580000003 HC RX 258: Performed by: INTERNAL MEDICINE

## 2019-07-03 PROCEDURE — 85025 COMPLETE CBC W/AUTO DIFF WBC: CPT

## 2019-07-03 RX ORDER — MULTIVITAMIN WITH FOLIC ACID 400 MCG
TABLET ORAL
Status: DISCONTINUED
Start: 2019-07-03 | End: 2019-07-03 | Stop reason: WASHOUT

## 2019-07-03 RX ADMIN — ACETAMINOPHEN 650 MG: 325 TABLET ORAL at 03:39

## 2019-07-03 RX ADMIN — LISINOPRIL 20 MG: 20 TABLET ORAL at 08:16

## 2019-07-03 RX ADMIN — INSULIN LISPRO 1 UNITS: 100 INJECTION, SOLUTION INTRAVENOUS; SUBCUTANEOUS at 08:13

## 2019-07-03 RX ADMIN — INSULIN LISPRO 1 UNITS: 100 INJECTION, SOLUTION INTRAVENOUS; SUBCUTANEOUS at 12:23

## 2019-07-03 RX ADMIN — THERA TABS 1 TABLET: TAB at 12:24

## 2019-07-03 RX ADMIN — ENOXAPARIN SODIUM 135 MG: 150 INJECTION SUBCUTANEOUS at 08:13

## 2019-07-03 RX ADMIN — SODIUM CHLORIDE, PRESERVATIVE FREE 10 ML: 5 INJECTION INTRAVENOUS at 08:13

## 2019-07-03 RX ADMIN — BUPROPION HYDROCHLORIDE 150 MG: 150 TABLET, EXTENDED RELEASE ORAL at 08:14

## 2019-07-03 RX ADMIN — HYDROCHLOROTHIAZIDE 25 MG: 25 TABLET ORAL at 08:14

## 2019-07-03 ASSESSMENT — PAIN DESCRIPTION - LOCATION: LOCATION: LEG

## 2019-07-03 ASSESSMENT — PAIN DESCRIPTION - ORIENTATION: ORIENTATION: RIGHT;LOWER

## 2019-07-03 ASSESSMENT — PAIN DESCRIPTION - PAIN TYPE: TYPE: ACUTE PAIN

## 2019-07-03 ASSESSMENT — PAIN SCALES - GENERAL
PAINLEVEL_OUTOF10: 5
PAINLEVEL_OUTOF10: 5
PAINLEVEL_OUTOF10: 2

## 2019-07-03 ASSESSMENT — PAIN DESCRIPTION - DIRECTION: RADIATING_TOWARDS: BELOW KNEE

## 2019-07-03 NOTE — PROGRESS NOTES
Hospitalist Progress Note    Patient:  Yohannes Ann     YOB: 1960    MRN: [id number]   Admit date: 2019     Acct: [de-identified]     PCP: Celestina Wesley DO    CC--Interval History:  Bilateral PE---RLE DVT---improved---breathing without difficulty--O2 sat RA = 92%---RLE  swelling and tenderness----home----7. 3.2019--on Xarelto    HTN---137/    IFG--variable blood glucose    See note below     All other ROS negative except noted in HPI    Diet:  DIET CARB CONTROL;    Medications:  Scheduled Meds:   rivaroxaban  15 mg Oral BID    buPROPion  150 mg Oral QAM    multivitamin  1 tablet Oral Daily    insulin lispro  0-6 Units Subcutaneous TID WC    insulin lispro  0-3 Units Subcutaneous Nightly    sodium chloride flush  10 mL Intravenous 2 times per day    lisinopril  20 mg Oral Daily    And    hydrochlorothiazide  25 mg Oral Daily     Continuous Infusions:   dextrose       PRN Meds:glucose, dextrose, glucagon (rDNA), dextrose, sodium chloride flush, magnesium hydroxide, ondansetron, acetaminophen, HYDROcodone 5 mg - acetaminophen    Objective:  Labs:  CBC with Differential:    Lab Results   Component Value Date    WBC 6.5 2019    RBC 5.64 2019    HGB 16.4 2019    HCT 48.9 2019     2019    MCV 86.8 2019    MCH 29.1 2019    MCHC 33.5 2019    RDW 14.7 2019    LYMPHOPCT 24 2019    MONOPCT 9 2019    BASOPCT 1 2019    MONOSABS 0.60 2019    LYMPHSABS 1.50 2019    EOSABS 0.10 2019    BASOSABS 0.10 2019    DIFFTYPE NOT REPORTED 2019     BMP:    Lab Results   Component Value Date     2019    K 4.0 2019    CL 98 2019    CO2 28 2019    BUN 14 2019    LABALBU 4.2 2019    CREATININE 0.89 2019    CALCIUM 9.6 2019    GFRAA >60 2019    LABGLOM >60 2019    GLUCOSE 140 2019           Physical Exam:  Vitals: /67   Pulse 67

## 2019-07-04 NOTE — DISCHARGE SUMMARY
the discharge orders are specifically discontinued. The patient has been advised of the financial relationship and ownership  interests between [address] [place] [address] physicians and  employees of [facility] and [address] [place] [address].         Pearl Cody    D: 07/03/2019 13:39:50       T: 07/03/2019 13:47:21     [email or web address]  Job#: [id number]     Doc#: [id number]    CC:

## 2019-07-05 ENCOUNTER — CARE COORDINATION (OUTPATIENT)
Dept: CASE MANAGEMENT | Age: 59
End: 2019-07-05

## 2019-07-05 DIAGNOSIS — I10 ESSENTIAL HYPERTENSION: Primary | Chronic | ICD-10-CM

## 2019-07-05 NOTE — CARE COORDINATION
[facility] Transitions Initial Follow Up Call    Call within 2 business days of discharge: Yes    Patient: Gissel Rojo Patient : 1960   MRN: <[id number]>  Reason for Admission: Pulmonary Embolism  Discharge Date: 7/3/19 RARS: Readmission Risk Score: 9      Last Discharge [facility]       Complaint Diagnosis Description Type Department Provider    19 Other Acute pulmonary embolism without acute cor pulmonale, unspecified pulmonary embolism type ([address] . .. ED to Hosp-Admission (Discharged) (ADMITTED) [facility] KENDAL Li MD           Spoke with: Gissel Rojo, patient    Facility: [place] [facility]    Non-face-to-face services provided:      Care Transitions 24 Hour Call    Care Transitions Interventions         Follow Up:  Spoke with patient for initial transition call. He states that he is doing about the same. States pain and swelling in right calf has reduced. Denies having any shortness of breath or chest pain/discomfort. He does not have home health services. Reviewed medication list.  1111F completed. Patient has follow up appointment on 7/10/19. He is aware to contact his MD with any changes or questions. No questions or concerns at this time. He is agreeable to follow up calls.       Future Appointments   Date Time Provider Thao Hobbs   7/10/2019  9:20 AM SHAKIRA Cobos CNP [facility]   2019  4:00 PM SHAKIRA Ureña CNP [facility]       Tere Aragon RN

## 2019-07-08 DIAGNOSIS — R73.01 IMPAIRED FASTING GLUCOSE: Primary | ICD-10-CM

## 2019-07-09 ENCOUNTER — CARE COORDINATION (OUTPATIENT)
Dept: CASE MANAGEMENT | Age: 59
End: 2019-07-09

## 2019-07-10 ENCOUNTER — OFFICE VISIT (OUTPATIENT)
Dept: FAMILY MEDICINE CLINIC | Age: 59
End: 2019-07-10
Payer: COMMERCIAL

## 2019-07-10 VITALS
WEIGHT: 275.8 LBS | BODY MASS INDEX: 38.61 KG/M2 | TEMPERATURE: 97.3 F | HEIGHT: 71 IN | OXYGEN SATURATION: 94 % | DIASTOLIC BLOOD PRESSURE: 89 MMHG | HEART RATE: 73 BPM | SYSTOLIC BLOOD PRESSURE: 134 MMHG

## 2019-07-10 DIAGNOSIS — I10 ESSENTIAL HYPERTENSION: ICD-10-CM

## 2019-07-10 DIAGNOSIS — I82.501 CHRONIC DEEP VEIN THROMBOSIS (DVT) OF RIGHT LOWER EXTREMITY, UNSPECIFIED VEIN (HCC): Primary | ICD-10-CM

## 2019-07-10 PROCEDURE — 1111F DSCHRG MED/CURRENT MED MERGE: CPT | Performed by: NURSE PRACTITIONER

## 2019-07-10 PROCEDURE — 99495 TRANSJ CARE MGMT MOD F2F 14D: CPT | Performed by: NURSE PRACTITIONER

## 2019-07-10 RX ORDER — LISINOPRIL AND HYDROCHLOROTHIAZIDE 25; 20 MG/1; MG/1
1 TABLET ORAL DAILY
Qty: 90 TABLET | Refills: 3 | Status: SHIPPED | OUTPATIENT
Start: 2019-07-10 | End: 2020-08-20

## 2019-07-10 NOTE — PROGRESS NOTES
Weight: 275 lb 12.8 oz (125.1 kg)   Height: 5' 11\" (1.803 m)     Body mass index is 38.47 kg/m². Wt Readings from Last 3 Encounters:   07/10/19 275 lb 12.8 oz (125.1 kg)   07/03/19 287 lb (130.2 kg)   06/30/19 294 lb (133.4 kg)     BP Readings from Last 3 Encounters:   07/10/19 134/89   07/03/19 137/67   06/30/19 138/80        Physical Exam:  General Appearance: alert and oriented to person, place and time, well developed and well- nourished, in no acute distress  Skin: warm and dry, no rash or erythema  Head: normocephalic and atraumatic  Eyes: pupils equal, round, and reactive to light, extraocular eye movements intact, conjunctivae normal  ENT: tympanic membrane, external ear and ear canal normal bilaterally, nose without deformity, nasal mucosa and turbinates normal without polyps  Neck: supple and non-tender without mass, no thyromegaly or thyroid nodules, no cervical lymphadenopathy  Pulmonary/Chest: clear to auscultation bilaterally- no wheezes, rales or rhonchi, normal air movement, no respiratory distress  Cardiovascular: normal rate, regular rhythm, normal S1 and S2, no murmurs, rubs, clicks, or gallops, distal pulses intact, no carotid bruits  Abdomen: soft, non-tender, non-distended, normal bowel sounds, no masses or organomegaly  Extremities: no cyanosis or clubbing. Right calf remains larger than left. Musculoskeletal: normal range of motion, no joint swelling, deformity or tenderness  Neurologic: reflexes normal and symmetric, no cranial nerve deficit, gait, coordination and speech normal    Assessment/Plan:  1. Chronic deep vein thrombosis (DVT) of right lower extremity, unspecified vein (HCC)  - continue Xarelto twice daily for a full 21 days then  - rivaroxaban (XARELTO) 20 MG TABS tablet; Take 1 tablet by mouth daily (with breakfast)  Dispense: 90 tablet; Refill: 1  - [facility] DISCHARGE MEDS RECONCILED W/ CURRENT OUTPATIENT MED LIST    2.  Essential hypertension  - lisinopril-hydrochlorothiazide

## 2019-07-19 ENCOUNTER — CARE COORDINATION (OUTPATIENT)
Dept: CASE MANAGEMENT | Age: 59
End: 2019-07-19

## 2019-07-22 DIAGNOSIS — E34.9 HYPOTESTOSTERONISM: ICD-10-CM

## 2019-07-23 RX ORDER — TESTOSTERONE 16.2 MG/G
GEL TRANSDERMAL
Qty: 150 G | Refills: 1 | Status: SHIPPED | OUTPATIENT
Start: 2019-07-23 | End: 2019-11-24 | Stop reason: SDUPTHER

## 2019-07-30 ENCOUNTER — OFFICE VISIT (OUTPATIENT)
Dept: FAMILY MEDICINE CLINIC | Age: 59
End: 2019-07-30
Payer: COMMERCIAL

## 2019-07-30 VITALS
DIASTOLIC BLOOD PRESSURE: 82 MMHG | WEIGHT: 278 LBS | BODY MASS INDEX: 38.92 KG/M2 | SYSTOLIC BLOOD PRESSURE: 122 MMHG | HEIGHT: 71 IN | OXYGEN SATURATION: 96 % | HEART RATE: 86 BPM

## 2019-07-30 DIAGNOSIS — F41.9 ANXIETY: ICD-10-CM

## 2019-07-30 DIAGNOSIS — M25.50 MULTIPLE JOINT PAIN: ICD-10-CM

## 2019-07-30 DIAGNOSIS — F32.A DEPRESSION, UNSPECIFIED DEPRESSION TYPE: ICD-10-CM

## 2019-07-30 DIAGNOSIS — I26.92 ACUTE SADDLE PULMONARY EMBOLISM WITHOUT ACUTE COR PULMONALE (HCC): Primary | ICD-10-CM

## 2019-07-30 PROCEDURE — 99213 OFFICE O/P EST LOW 20 MIN: CPT | Performed by: NURSE PRACTITIONER

## 2019-07-30 RX ORDER — CITALOPRAM 10 MG/1
10 TABLET ORAL DAILY
Qty: 30 TABLET | Refills: 3 | Status: SHIPPED | OUTPATIENT
Start: 2019-07-30 | End: 2020-01-16 | Stop reason: ALTCHOICE

## 2019-07-30 NOTE — LETTER
[address]  [facility]  Phone: [phone]  Fax: [phone]    SHAKIRA Parks CNP        July 31, 2019     Patient: Fernanda Mack   YOB: 1960   Date of Visit: 7/30/2019       To Whom It May Concern: It is my medical opinion that Fernanda Mack should remain out of work until patient is evaluated by Hematology. The date of that appointment is August 22, 2019. At that time the Hematologist can determine his back to work date. If you have any questions or concerns, please don't hesitate to call.     Sincerely,        SHAKIRA Parks CNP

## 2019-07-31 ENCOUNTER — TELEPHONE (OUTPATIENT)
Dept: FAMILY MEDICINE CLINIC | Age: 59
End: 2019-07-31

## 2019-08-01 ASSESSMENT — ENCOUNTER SYMPTOMS
SHORTNESS OF BREATH: 0
WHEEZING: 0
COUGH: 0

## 2019-08-20 ENCOUNTER — OFFICE VISIT (OUTPATIENT)
Dept: PODIATRY | Age: 59
End: 2019-08-20
Payer: COMMERCIAL

## 2019-08-20 VITALS
SYSTOLIC BLOOD PRESSURE: 132 MMHG | DIASTOLIC BLOOD PRESSURE: 78 MMHG | WEIGHT: 276 LBS | HEIGHT: 71 IN | BODY MASS INDEX: 38.64 KG/M2 | HEART RATE: 72 BPM

## 2019-08-20 DIAGNOSIS — E11.49 DM (DIABETES MELLITUS), TYPE 2 WITH NEUROLOGICAL COMPLICATIONS (HCC): Primary | ICD-10-CM

## 2019-08-20 DIAGNOSIS — B35.1 DERMATOPHYTOSIS OF NAIL: ICD-10-CM

## 2019-08-20 DIAGNOSIS — M20.40 HAMMER TOE, UNSPECIFIED LATERALITY: ICD-10-CM

## 2019-08-20 PROCEDURE — 99213 OFFICE O/P EST LOW 20 MIN: CPT | Performed by: PODIATRIST

## 2019-08-22 ENCOUNTER — OFFICE VISIT (OUTPATIENT)
Dept: ONCOLOGY | Age: 59
End: 2019-08-22
Payer: COMMERCIAL

## 2019-08-22 VITALS
SYSTOLIC BLOOD PRESSURE: 122 MMHG | OXYGEN SATURATION: 97 % | RESPIRATION RATE: 16 BRPM | WEIGHT: 283 LBS | HEIGHT: 71 IN | TEMPERATURE: 97.3 F | HEART RATE: 68 BPM | BODY MASS INDEX: 39.62 KG/M2 | DIASTOLIC BLOOD PRESSURE: 78 MMHG

## 2019-08-22 DIAGNOSIS — E66.01 MORBID OBESITY (HCC): ICD-10-CM

## 2019-08-22 DIAGNOSIS — I26.99 OTHER ACUTE PULMONARY EMBOLISM WITHOUT ACUTE COR PULMONALE (HCC): Primary | ICD-10-CM

## 2019-08-22 PROCEDURE — 99244 OFF/OP CNSLTJ NEW/EST MOD 40: CPT | Performed by: INTERNAL MEDICINE

## 2019-08-22 NOTE — PROGRESS NOTES
Irene Lopez                                                                                                                  8/22/2019  MRN:   [id number]  YOB: 1960  PCP:                           SHAKIRA Willingham - CNP  Referring Physician: Amarilis Jauregui  Treating Physician Name: Sheila Cui MD      Reason for consultation:  Right lower extremity DVT and pulmonary embolism    Current problems/ Active and recent treatments:  Right femoral, popliteal and gastrocnemius DVT  Segmental and subsegmental pulmonary embolism. Summary of Case/History:    Irene Lopez a 66 y.o.male presents to the office to establish care and for further evaluation treatment recommendation regarding anticoagulation for patient's acute DVT and pulmonary embolism  Patient started noticing swelling of his right lower extremity back in July 2019. Work-up including ultrasound of lower extremity confirmed right lower extremity DVT involving femoral popliteal and gastrocnemius vein. Additional work-up including CT chest showed segmental and subsegmental very embolism. Patient was started on anticoagulation and discharged home on Xarelto. Patient recalls any surgery travel trauma before the pulmonary embolism and DVT. Patient does not smoke. He does not give any personal history of previous venous thrombi embolism. Does not give any family history of venous thrombi embolism. Patient is on testosterone supplementation. His BMI is 39.4. Patient has been tolerating anticoagulation without any complications or severe side effects.       Past Medical History:   Past Medical History:   Diagnosis Date    Cervical disc disease     Depression     Diabetes mellitus ([address]     PREDIABETIC    Erectile dysfunction     Hepatitis C     tx 6mon completed 8/913 Dr Ry Newell TREATMENT    History of gastroesophageal reflux (GERD)     Hypertension     Hypotestosteronism     Impaired fasting glucose Attends [employer] service: None     Active member of club or organization: None     Attends meetings of clubs or organizations: None     Relationship status: None    Intimate partner violence:     Fear of current or ex partner: None     Emotionally abused: None     Physically abused: None     Forced sexual activity: None   Other Topics Concern    None   Social History Narrative    None     Family History   Problem Relation Age of Onset    High Blood Pressure Mother     Diabetes Mother     Cataracts Mother     Cancer Father     Stroke Father     Other Brother         HEPATITIS C    Diabetes Sister     Diabetes Brother     Glaucoma Neg Hx        Current Medications:     Current Outpatient Medications   Medication Sig Dispense Refill    citalopram (CELEXA) 10 MG tablet Take 1 tablet by mouth daily 30 tablet 3    Testosterone (ANDROGEL) 20.25 MG/ACT (1.62%) GEL gel PLACE 4 PUMPS ONTO THE SKIN DAILY FOR 30 DAYS. 150 g 1    lisinopril-hydrochlorothiazide (PRINZIDE;ZESTORETIC) 20-25 MG per tablet Take 1 tablet by mouth daily 90 tablet 3    rivaroxaban (XARELTO) 20 MG TABS tablet Take 1 tablet by mouth daily (with breakfast) 90 tablet 1    rivaroxaban (XARELTO) 15 MG TABS tablet 15 mg BID orally times 21 days then 20 mg daily thereafter 42 tablet 0    buPROPion (WELLBUTRIN XL) 150 MG extended release tablet Take 1 tablet by mouth every morning 30 tablet 3    metFORMIN (GLUCOPHAGE) 500 MG tablet Take 1 tablet by mouth daily With a meal 90 tablet 3    therapeutic multivitamin-minerals (THERAGRAN-M) tablet Take 1 tablet by mouth daily. No current facility-administered medications for this visit. Allergies:   Gabapentin and Seasonal    Review of Systems:    Constitutional: No fever or chills.  No night sweats, no weight loss   Eyes: No eye discharge, double vision, or eye pain   HEENT: negative for sore mouth, sore throat, hoarseness and voice change   Respiratory: negative for cough , sputum,

## 2019-09-05 ENCOUNTER — OFFICE VISIT (OUTPATIENT)
Dept: FAMILY MEDICINE CLINIC | Age: 59
End: 2019-09-05
Payer: COMMERCIAL

## 2019-09-05 VITALS
HEIGHT: 71 IN | SYSTOLIC BLOOD PRESSURE: 122 MMHG | DIASTOLIC BLOOD PRESSURE: 62 MMHG | HEART RATE: 74 BPM | WEIGHT: 276 LBS | OXYGEN SATURATION: 94 % | BODY MASS INDEX: 38.64 KG/M2

## 2019-09-05 DIAGNOSIS — F32.A DEPRESSION, UNSPECIFIED DEPRESSION TYPE: Primary | ICD-10-CM

## 2019-09-05 DIAGNOSIS — F41.9 ANXIETY: ICD-10-CM

## 2019-09-05 PROCEDURE — 99213 OFFICE O/P EST LOW 20 MIN: CPT | Performed by: NURSE PRACTITIONER

## 2019-09-05 ASSESSMENT — ENCOUNTER SYMPTOMS
COUGH: 0
SHORTNESS OF BREATH: 0
WHEEZING: 0

## 2019-09-05 NOTE — PROGRESS NOTES
2019     Amaya Luevano (:  1960) is a 66 y.o. male, here for evaluation of the following medical concerns:    HPI  Pt presents to the clinic for a 4 week follow up of depression. He is currently on 10mg celexa. He feels that the medication has taken the edge off of his anxiety. It has also significantly helped his sleep. He is sleeping better now than he has in years. He is back to work which has also helped his anxiety level. He denies thoughts of suicide or homicide. He is not seeing a counselor and does not see a need for it at this time. He has no further concerns today.     Past Medical History:   Diagnosis Date    Cervical disc disease     Depression     Diabetes mellitus ([address]     PREDIABETIC    Erectile dysfunction     Hepatitis C     tx 6mon completed  Dr Brandi Corona TREATMENT    History of gastroesophageal reflux (GERD)     Hypertension     Hypotestosteronism     Impaired fasting glucose     Laceration 2017    TOP OF HEAD    Neck pain     Obesity     Sleep apnea     CPAP MACHINE    Substance abuse in remission ([address]     IV DRUG USER 30 YEARS AGO    Substance abuse in remission ([address]     ALCOHOLIC 20 YEARS    Testicular hypofunction     Wears glasses        Past Surgical History:   Procedure Laterality Date    CERVICAL FUSION  2017    C5-7    CERVICAL FUSION N/A 2017     ANTERIOR CERVICAL DECOMPRESSION FUSION C5-C7 performed by Teresa [facility]is MD at [facility]  2014    polyps    COLONOSCOPY  2015    sigmoid diverticulosis    HAMMER TOE SURGERY Left     2ND  AND 3RD TOE DR ROLLING [facility]    LIVER BIOPSY      TYMPANIC MEMBRANE REPAIR      MULTIPLE AS A CHILD       Social History     Socioeconomic History    Marital status:      Spouse name: None    Number of children: None    Years of education: None    Highest education level: None   Occupational History    None   Social Needs    Financial resource

## 2019-09-18 ENCOUNTER — OFFICE VISIT (OUTPATIENT)
Dept: OPTOMETRY | Age: 59
End: 2019-09-18
Payer: COMMERCIAL

## 2019-09-18 DIAGNOSIS — E11.9 NON-INSULIN DEPENDENT TYPE 2 DIABETES MELLITUS (HCC): Primary | ICD-10-CM

## 2019-09-18 DIAGNOSIS — H52.13 MYOPIA OF BOTH EYES WITH ASTIGMATISM AND PRESBYOPIA: ICD-10-CM

## 2019-09-18 DIAGNOSIS — H52.4 MYOPIA OF BOTH EYES WITH ASTIGMATISM AND PRESBYOPIA: ICD-10-CM

## 2019-09-18 DIAGNOSIS — H53.8 BLURRED VISION: ICD-10-CM

## 2019-09-18 DIAGNOSIS — H52.203 MYOPIA OF BOTH EYES WITH ASTIGMATISM AND PRESBYOPIA: ICD-10-CM

## 2019-09-18 PROCEDURE — 92250 FUNDUS PHOTOGRAPHY W/I&R: CPT | Performed by: OPTOMETRIST

## 2019-09-18 PROCEDURE — 99214 OFFICE O/P EST MOD 30 MIN: CPT | Performed by: OPTOMETRIST

## 2019-09-18 RX ORDER — TROPICAMIDE 10 MG/ML
1 SOLUTION/ DROPS OPHTHALMIC ONCE
Status: COMPLETED | OUTPATIENT
Start: 2019-09-18 | End: 2019-09-18

## 2019-09-18 RX ORDER — PHENYLEPHRINE HCL 2.5 %
1 DROPS OPHTHALMIC (EYE) ONCE
Status: COMPLETED | OUTPATIENT
Start: 2019-09-18 | End: 2019-09-18

## 2019-09-18 RX ADMIN — TROPICAMIDE 1 DROP: 10 SOLUTION/ DROPS OPHTHALMIC at 14:32

## 2019-09-18 RX ADMIN — Medication 1 DROP: at 14:32

## 2019-09-18 ASSESSMENT — REFRACTION_MANIFEST
OS_AXIS: 062
OS_SPHERE: -1.00
OD_SPHERE: -0.75
OD_AXIS: 099
OD_CYLINDER: -2.00
OS_ADD: +2.25
OS_CYLINDER: -2.00
OD_ADD: +2.25

## 2019-09-18 ASSESSMENT — TONOMETRY
IOP_METHOD: NON-CONTACT AIR PUFF
OD_IOP_MMHG: 20
OS_IOP_MMHG: 19

## 2019-09-18 ASSESSMENT — VISUAL ACUITY
OD_CC+: -1
OS_CC+: -1
CORRECTION_TYPE: GLASSES
OD_CC: 20/30 OU
METHOD: SNELLEN - LINEAR
OS_CC: 20/25

## 2019-09-18 ASSESSMENT — REFRACTION_WEARINGRX
SPECS_TYPE: PAL
OS_AXIS: 062
OD_ADD: +2.25
OD_CYLINDER: -1.75
OS_SPHERE: -1.25
OS_ADD: +2.25
OD_AXIS: 099
OS_CYLINDER: -1.50
OD_SPHERE: -1.00

## 2019-09-18 ASSESSMENT — KERATOMETRY
OD_K2POWER_DIOPTERS: 42.00
OD_AXISANGLE_DEGREES: 021
OS_K1POWER_DIOPTERS: 41.25
OD_K1POWER_DIOPTERS: 41.50
OS_K2POWER_DIOPTERS: 42.25
OS_AXISANGLE2_DEGREES: 063
OS_AXISANGLE_DEGREES: 153
OD_AXISANGLE2_DEGREES: 111

## 2019-09-18 ASSESSMENT — SLIT LAMP EXAM - LIDS
COMMENTS: NORMAL
COMMENTS: NORMAL

## 2019-09-18 NOTE — PROGRESS NOTES
Shree Atkins presents today for   Chief Complaint   Patient presents with    Blurred Vision    Ophth Diabetic Exam   .    HPI     Blurred Vision     In both eyes. Vision is blurred. Context:  distance vision and reading. Comments     Last Vision Exam: 7/6/2017 Aw  Last Ophthalmology Exam: n/a  Last Filled Glasses Rx: 7/6/2017  Insurance: BCBS/ VSP not FF  Update: Dm Exam; Glasses  Distance and reading are getting blurry; have episodes where it is harder to get focused. Diabetic:  Sugars: does not have to check    HmgA1C: 6.8  7/1/2019  Vision is a little off ;                  Current Outpatient Medications   Medication Sig Dispense Refill    citalopram (CELEXA) 10 MG tablet Take 1 tablet by mouth daily 30 tablet 3    Testosterone (ANDROGEL) 20.25 MG/ACT (1.62%) GEL gel PLACE 4 PUMPS ONTO THE SKIN DAILY FOR 30 DAYS. 150 g 1    lisinopril-hydrochlorothiazide (PRINZIDE;ZESTORETIC) 20-25 MG per tablet Take 1 tablet by mouth daily 90 tablet 3    rivaroxaban (XARELTO) 20 MG TABS tablet Take 1 tablet by mouth daily (with breakfast) 90 tablet 1    buPROPion (WELLBUTRIN XL) 150 MG extended release tablet Take 1 tablet by mouth every morning 30 tablet 3    metFORMIN (GLUCOPHAGE) 500 MG tablet Take 1 tablet by mouth daily With a meal 90 tablet 3    therapeutic multivitamin-minerals (THERAGRAN-M) tablet Take 1 tablet by mouth daily.        Current Facility-Administered Medications   Medication Dose Route Frequency Provider Last Rate Last Dose    tropicamide (MYDRIACYL) 1 % ophthalmic solution 1 drop  1 drop Both Eyes Once Miryam Lambert, JULIO C        phenylephrine (MYDFRIN) 2.5 % ophthalmic solution 1 drop  1 drop Both Eyes Once Amy Lennox Churchman, OD             Family History   Problem Relation Age of Onset    High Blood Pressure Mother     Diabetes Mother     Cataracts Mother     Cancer Father     Stroke Father     Other Brother         HEPATITIS C    Diabetes Sister     Diabetes Brother    

## 2019-09-26 DIAGNOSIS — F32.A DEPRESSION, UNSPECIFIED DEPRESSION TYPE: Primary | ICD-10-CM

## 2019-09-26 RX ORDER — BUPROPION HYDROCHLORIDE 150 MG/1
300 TABLET ORAL EVERY MORNING
Qty: 60 TABLET | Refills: 3 | Status: SHIPPED | OUTPATIENT
Start: 2019-09-26 | End: 2020-01-16 | Stop reason: ALTCHOICE

## 2019-09-26 NOTE — TELEPHONE ENCOUNTER
Have him go down to 1/2 tablet of celexa and he can take 2 of his wellbutrin. He is to let me know in about 3 weeks how he is doing.

## 2019-10-08 ENCOUNTER — TELEPHONE (OUTPATIENT)
Dept: FAMILY MEDICINE CLINIC | Age: 59
End: 2019-10-08

## 2019-10-08 DIAGNOSIS — F32.A DEPRESSION, UNSPECIFIED DEPRESSION TYPE: Primary | ICD-10-CM

## 2019-10-14 ENCOUNTER — TELEPHONE (OUTPATIENT)
Dept: FAMILY MEDICINE CLINIC | Age: 59
End: 2019-10-14

## 2019-10-14 DIAGNOSIS — F32.A DEPRESSION, UNSPECIFIED DEPRESSION TYPE: Primary | ICD-10-CM

## 2019-10-14 RX ORDER — BUPROPION HYDROCHLORIDE 300 MG/1
300 TABLET ORAL EVERY MORNING
Qty: 90 TABLET | Refills: 1 | Status: SHIPPED | OUTPATIENT
Start: 2019-10-14 | End: 2020-05-04

## 2019-11-24 DIAGNOSIS — R73.01 IMPAIRED FASTING GLUCOSE: ICD-10-CM

## 2019-12-10 ENCOUNTER — HOSPITAL ENCOUNTER (OUTPATIENT)
Dept: LAB | Age: 59
Discharge: HOME | End: 2019-12-10
Payer: COMMERCIAL

## 2019-12-10 DIAGNOSIS — I26.99 OTHER ACUTE PULMONARY EMBOLISM WITHOUT ACUTE COR PULMONALE (HCC): ICD-10-CM

## 2019-12-10 DIAGNOSIS — R73.01 IMPAIRED FASTING GLUCOSE: ICD-10-CM

## 2019-12-10 LAB
ALBUMIN SERPL-MCNC: 4.5 G/DL (ref 3.5–5.2)
ALBUMIN/GLOBULIN RATIO: 1.5 (ref 1–2.5)
ALP BLD-CCNC: 73 U/L (ref 40–129)
ALT SERPL-CCNC: 34 U/L (ref 5–41)
ANION GAP SERPL CALCULATED.3IONS-SCNC: 10 MMOL/L (ref 9–17)
AST SERPL-CCNC: 23 U/L
BILIRUB SERPL-MCNC: 0.5 MG/DL (ref 0.3–1.2)
BUN BLDV-MCNC: 22 MG/DL (ref 6–20)
BUN/CREAT BLD: 27 (ref 9–20)
CALCIUM SERPL-MCNC: 9.6 MG/DL (ref 8.6–10.4)
CHLORIDE BLD-SCNC: 102 MMOL/L (ref 98–107)
CHOLESTEROL/HDL RATIO: 5.1
CHOLESTEROL: 159 MG/DL
CO2: 25 MMOL/L (ref 20–31)
CREAT SERPL-MCNC: 0.83 MG/DL (ref 0.7–1.2)
ESTIMATED AVERAGE GLUCOSE: 126 MG/DL
GFR AFRICAN AMERICAN: >60 ML/MIN
GFR NON-AFRICAN AMERICAN: >60 ML/MIN
GFR SERPL CREATININE-BSD FRML MDRD: ABNORMAL ML/MIN/{1.73_M2}
GFR SERPL CREATININE-BSD FRML MDRD: ABNORMAL ML/MIN/{1.73_M2}
GLUCOSE BLD-MCNC: 131 MG/DL (ref 70–99)
HBA1C MFR BLD: 6 % (ref 4.8–5.9)
HDLC SERPL-MCNC: 31 MG/DL
LDL CHOLESTEROL: 101 MG/DL (ref 0–130)
POTASSIUM SERPL-SCNC: 4 MMOL/L (ref 3.7–5.3)
SODIUM BLD-SCNC: 137 MMOL/L (ref 135–144)
TOTAL PROTEIN: 7.5 G/DL (ref 6.4–8.3)
TRIGL SERPL-MCNC: 133 MG/DL
VLDLC SERPL CALC-MCNC: ABNORMAL MG/DL (ref 1–30)

## 2019-12-10 PROCEDURE — 81241 F5 GENE: CPT

## 2019-12-10 PROCEDURE — 83036 HEMOGLOBIN GLYCOSYLATED A1C: CPT

## 2019-12-10 PROCEDURE — 81240 F2 GENE: CPT

## 2019-12-10 PROCEDURE — 80061 LIPID PANEL: CPT

## 2019-12-10 PROCEDURE — 36415 COLL VENOUS BLD VENIPUNCTURE: CPT

## 2019-12-10 PROCEDURE — 80053 COMPREHEN METABOLIC PANEL: CPT

## 2019-12-12 ENCOUNTER — OFFICE VISIT (OUTPATIENT)
Dept: ONCOLOGY | Age: 59
End: 2019-12-12
Payer: COMMERCIAL

## 2019-12-12 VITALS
HEIGHT: 71 IN | BODY MASS INDEX: 38.42 KG/M2 | HEART RATE: 68 BPM | SYSTOLIC BLOOD PRESSURE: 134 MMHG | WEIGHT: 274.4 LBS | DIASTOLIC BLOOD PRESSURE: 80 MMHG | TEMPERATURE: 98 F

## 2019-12-12 DIAGNOSIS — E34.9 HYPOTESTOSTERONISM: ICD-10-CM

## 2019-12-12 DIAGNOSIS — I26.99 OTHER ACUTE PULMONARY EMBOLISM WITHOUT ACUTE COR PULMONALE (HCC): Primary | ICD-10-CM

## 2019-12-12 DIAGNOSIS — E66.01 MORBID OBESITY (HCC): ICD-10-CM

## 2019-12-12 PROCEDURE — 99213 OFFICE O/P EST LOW 20 MIN: CPT | Performed by: INTERNAL MEDICINE

## 2019-12-16 ENCOUNTER — OFFICE VISIT (OUTPATIENT)
Dept: FAMILY MEDICINE CLINIC | Age: 59
End: 2019-12-16
Payer: COMMERCIAL

## 2019-12-16 VITALS
DIASTOLIC BLOOD PRESSURE: 82 MMHG | SYSTOLIC BLOOD PRESSURE: 134 MMHG | BODY MASS INDEX: 38.64 KG/M2 | HEART RATE: 81 BPM | RESPIRATION RATE: 14 BRPM | HEIGHT: 71 IN | WEIGHT: 276 LBS | OXYGEN SATURATION: 98 %

## 2019-12-16 DIAGNOSIS — L98.9 SKIN LESION OF LEFT LEG: ICD-10-CM

## 2019-12-16 DIAGNOSIS — L82.1 KERATOSIS SEBORRHEICA: Primary | ICD-10-CM

## 2019-12-16 PROCEDURE — 99213 OFFICE O/P EST LOW 20 MIN: CPT | Performed by: FAMILY MEDICINE

## 2019-12-19 DIAGNOSIS — N52.9 ERECTILE DYSFUNCTION, UNSPECIFIED ERECTILE DYSFUNCTION TYPE: ICD-10-CM

## 2019-12-19 LAB
FACTOR V LEIDEN MUTATION: NORMAL
PROTHROMBIN G20210A MUTATION: NORMAL

## 2019-12-19 RX ORDER — SILDENAFIL 50 MG/1
TABLET, FILM COATED ORAL
Qty: 30 TABLET | Refills: 1 | Status: SHIPPED | OUTPATIENT
Start: 2019-12-19 | End: 2021-01-27 | Stop reason: SDUPTHER

## 2020-01-16 ENCOUNTER — OFFICE VISIT (OUTPATIENT)
Dept: FAMILY MEDICINE CLINIC | Age: 60
End: 2020-01-16
Payer: COMMERCIAL

## 2020-01-16 VITALS
WEIGHT: 283 LBS | SYSTOLIC BLOOD PRESSURE: 138 MMHG | OXYGEN SATURATION: 96 % | BODY MASS INDEX: 39.62 KG/M2 | DIASTOLIC BLOOD PRESSURE: 84 MMHG | HEART RATE: 72 BPM | HEIGHT: 71 IN

## 2020-01-16 PROCEDURE — 99214 OFFICE O/P EST MOD 30 MIN: CPT | Performed by: NURSE PRACTITIONER

## 2020-01-16 NOTE — PATIENT INSTRUCTIONS
Patient Education        Plantar Fasciitis: Exercises  Introduction  Here are some examples of exercises for you to try. The exercises may be suggested for a condition or for rehabilitation. Start each exercise slowly. Ease off the exercises if you start to have pain. You will be told when to start these exercises and which ones will work best for you. How to do the exercises  Towel stretch   1. Sit with your legs extended and knees straight. 2. Place a towel around your foot just under the toes. 3. Hold each end of the towel in each hand, with your hands above your knees. 4. Pull back with the towel so that your foot stretches toward you. 5. Hold the position for at least 15 to 30 seconds. 6. Repeat 2 to 4 times a session, up to 5 sessions a day. Calf stretch   1. Stand facing a wall with your hands on the wall at about eye level. Put the leg you want to stretch about a step behind your other leg. 2. Keeping your back heel on the floor, bend your front knee until you feel a stretch in the back leg. 3. Hold the stretch for 15 to 30 seconds. Repeat 2 to 4 times. Plantar fascia and calf stretch   1. Stand on a step as shown above. Be sure to hold on to the banister. 2. Slowly let your heels down over the edge of the step as you relax your calf muscles. You should feel a gentle stretch across the bottom of your foot and up the back of your leg to your knee. 3. Hold the stretch about 15 to 30 seconds, and then tighten your calf muscle a little to bring your heel back up to the level of the step. Repeat 2 to 4 times. Towel curls   1. While sitting, place your foot on a towel on the floor and scrunch the towel toward you with your toes. 2. Then, also using your toes, push the towel away from you. [place] pickups   1. Put marbles on the floor next to a cup.  2. Using your toes, try to lift the marbles up from the floor and put them in the cup.     Follow-up care is a key part of your treatment and safety. Be sure to make and go to all appointments, and call your doctor if you are having problems. It's also a good idea to know your test results and keep a list of the medicines you take. Where can you learn more? Go to https://chpejoanne.Vint. org and sign in to your [employer] account. Enter N696 in the TechForward box to learn more about \"Plantar Fasciitis: Exercises. \"     If you do not have an account, please click on the \"Sign Up Now\" link. Current as of: June 26, 2019  Content Version: 12.3  © 1597-1981 [employer]. Care instructions adapted under license by [facility] ([facility]). If you have questions about a medical condition or this instruction, always ask your healthcare professional. [address] any warranty or liability for your use of this information. Patient Education        Learning About Diabetes Food Guidelines  Your Care Instructions    Meal planning is important to manage diabetes. It helps keep your blood sugar at a target level (which you set with your doctor). You don't have to eat special foods. You can eat what your family eats, including sweets once in a while. But you do have to pay attention to how often you eat and how much you eat of certain foods. You may want to work with a dietitian or a certified diabetes educator (CDE) to help you plan meals and snacks. A dietitian or CDE can also help you lose weight if that is one of your goals. What should you know about eating carbs? Managing the amount of carbohydrate (carbs) you eat is an important part of healthy meals when you have diabetes. Carbohydrate is found in many foods. · Learn which foods have carbs. And learn the amounts of carbs in different foods. ? Bread, cereal, pasta, and rice have about 15 grams of carbs in a serving. A serving is 1 slice of bread (1 ounce), ½ cup of cooked cereal, or 1/3 cup of cooked pasta or rice. ?  Fruits have 15 grams of carbs in a healthcare professional. [address] any warranty or liability for your use of this information. Patient Education        Learning About Meal Planning for Diabetes  Why plan your meals? Meal planning can be a key part of managing diabetes. Planning meals and snacks with the right balance of carbohydrate, protein, and fat can help you keep your blood sugar at the target level you set with your doctor. You don't have to eat special foods. You can eat what your family eats, including sweets once in a while. But you do have to pay attention to how often you eat and how much you eat of certain foods. You may want to work with a dietitian or a certified diabetes educator. He or she can give you tips and meal ideas and can answer your questions about meal planning. This health professional can also help you reach a healthy weight if that is one of your goals. What plan is right for you? Your dietitian or diabetes educator may suggest that you start with the plate format or carbohydrate counting. The plate format  The plate format is a simple way to help you manage how you eat. You plan meals by learning how much space each food should take on a plate. Using the plate format helps you spread carbohydrate throughout the day. It can make it easier to keep your blood sugar level within your target range. It also helps you see if you're eating healthy portion sizes. To use the plate format, you put non-starchy vegetables on half your plate. Add meat or meat substitutes on one-quarter of the plate. Put a grain or starchy vegetable (such as brown rice or a potato) on the final quarter of the plate. You can add a small piece of fruit and some low-fat or fat-free milk or yogurt, depending on your carbohydrate goal for each meal.  Here are some tips for using the plate format:  · Make sure that you are not using an oversized plate.  A 9-inch plate is best. Many restaurants use larger plates. · Get used to using the plate format at home. Then you can use it when you eat out. · Write down your questions about using the plate format. Talk to your doctor, a dietitian, or a diabetes educator about your concerns. Carbohydrate counting  With carbohydrate counting, you plan meals based on the amount of carbohydrate in each food. Carbohydrate raises blood sugar higher and more quickly than any other nutrient. It is found in desserts, breads and cereals, and fruit. It's also found in starchy vegetables such as potatoes and corn, grains such as rice and pasta, and milk and yogurt. Spreading carbohydrate throughout the day helps keep your blood sugar levels within your target range. Your daily amount depends on several things, including your weight, how active you are, which diabetes medicines you take, and what your goals are for your blood sugar levels. A registered dietitian or diabetes educator can help you plan how much carbohydrate to include in each meal and snack. A guideline for your daily amount of carbohydrate is:  · 45 to 60 grams at each meal. That's about the same as 3 to 4 carbohydrate servings. · 15 to 20 grams at each snack. That's about the same as 1 carbohydrate serving. The Nutrition Facts label on packaged foods tells you how much carbohydrate is in a serving of the food. First, look at the serving size on the food label. Is that the amount you eat in a serving? All of the nutrition information on a food label is based on that serving size. So if you eat more or less than that, you'll need to adjust the other numbers. Total carbohydrate is the next thing you need to look for on the label. If you count carbohydrate servings, one serving of carbohydrate is 15 grams. For foods that don't come with labels, such as fresh fruits and vegetables, you'll need a guide that lists carbohydrate in these foods.  Ask your doctor, dietitian, or diabetes educator about books or other nutrition guides you can use. If you take insulin, you need to know how many grams of carbohydrate are in a meal. This lets you know how much rapid-acting insulin to take before you eat. If you use an insulin pump, you get a constant rate of insulin during the day. So the pump must be programmed at meals to give you extra insulin to cover the rise in blood sugar after meals. When you know how much carbohydrate you will eat, you can take the right amount of insulin. Or, if you always use the same amount of insulin, you need to make sure that you eat the same amount of carbohydrate at meals. If you need more help to understand carbohydrate counting and food labels, ask your doctor, dietitian, or diabetes educator. How do you get started with meal planning? Here are some tips to get started:  · Plan your meals a week at a time. Don't forget to include snacks too. · Use cookbooks or online recipes to plan several main meals. Plan some quick meals for busy nights. You also can double some recipes that freeze well. Then you can save half for other busy nights when you don't have time to cook. · Make sure you have the ingredients you need for your recipes. If you're running low on basic items, put these items on your shopping list too. · List foods that you use to make breakfasts, lunches, and snacks. List plenty of fruits and vegetables. · Post this list on the refrigerator. Add to it as you think of more things you need. · Take the list to the store to do your weekly shopping. Follow-up care is a key part of your treatment and safety. Be sure to make and go to all appointments, and call your doctor if you are having problems. It's also a good idea to know your test results and keep a list of the medicines you take. Where can you learn more? Go to [email or web address].[email or web address] org and sign in to your [employer] account.  Enter R820 in the SeerGate box to learn more about \"Learning About Meal Planning for Diabetes. \"     If you do not have an account, please click on the \"Sign Up Now\" link. Current as of: April 16, 2019  Content Version: 12.3  © 3708-4912 Healthwise, Incorporated. Care instructions adapted under license by [facility] ([facility]). If you have questions about a medical condition or this instruction, always ask your healthcare professional. [address] any warranty or liability for your use of this information.

## 2020-01-17 RX ORDER — ESCITALOPRAM OXALATE 5 MG/1
5 TABLET ORAL DAILY
Qty: 30 TABLET | Refills: 3 | Status: SHIPPED | OUTPATIENT
Start: 2020-01-17 | End: 2020-03-08

## 2020-01-17 RX ORDER — TESTOSTERONE 16.2 MG/G
GEL TRANSDERMAL
Qty: 1 BOTTLE | Refills: 2 | Status: SHIPPED | OUTPATIENT
Start: 2020-01-17 | End: 2020-03-16

## 2020-01-17 NOTE — PROGRESS NOTES
Medications    Medication Sig Taking? Authorizing Provider   Testosterone (ANDROGEL) 20.25 MG/ACT (1.62%) GEL gel APPLY FOUR PUMPS ONTO THE SKIN DAILY Yes SHAKIRA Vivas CNP   escitalopram (LEXAPRO) 5 MG tablet Take 1 tablet by mouth daily Yes SHAKIRA Vivas CNP   sildenafil (VIAGRA) 50 MG tablet TAKE 1 TABLET BY MOUTH AS NEEDED FOR  ERECTILE DYSFUNCTION  SHAKIRA Vivas CNP   metFORMIN (GLUCOPHAGE) 500 MG tablet TAKE ONE TABLET BY MOUTH DAILY WITH A MEAL  SHAKIRA Smyth CNP   buPROPion (WELLBUTRIN XL) 300 MG extended release tablet Take 1 tablet by mouth every morning  SHAKIRA Vivas CNP   lisinopril-hydrochlorothiazide (PRINZIDE;ZESTORETIC) 20-25 MG per tablet Take 1 tablet by mouth daily  Elizebeth SHAKIRA Subramanian CNP   rivaroxaban (XARELTO) 20 MG TABS tablet Take 1 tablet by mouth daily (with breakfast)  SHAKIRA Dillon CNP   therapeutic multivitamin-minerals (THERAGRAN-M) tablet Take 1 tablet by mouth daily. Historical Provider, MD        Social History     Tobacco Use    Smoking status: Former Smoker     Packs/day: 1.50     Years: 9.00     Pack years: 13.50     Types: Cigarettes     Last attempt to quit: 1992     Years since quittin.4    Smokeless tobacco: Former User     Quit date: 1990    Tobacco comment: Ida Peck RRT 19   Substance Use Topics    Alcohol use: No     Alcohol/week: 0.0 standard drinks     Comment: HISTORY OF ALCOHOL ABUSE 30 YEARS AGO quit         Vitals:    20 1635   BP: 138/84   Site: Right Upper Arm   Position: Sitting   Cuff Size: Large Adult   Pulse: 72   SpO2: 96%   Weight: 283 lb (128.4 kg)   Height: 5' 11\" (1.803 m)     Estimated body mass index is 39.47 kg/m² as calculated from the following:    Height as of this encounter: 5' 11\" (1.803 m). Weight as of this encounter: 283 lb (128.4 kg). Physical Exam  Vitals signs and nursing note reviewed. Constitutional:       Appearance: Normal appearance. He is obese. HENT:      Head: Normocephalic and atraumatic. Cardiovascular:      Rate and Rhythm: Normal rate and regular rhythm. Heart sounds: Normal heart sounds. Pulmonary:      Effort: Pulmonary effort is normal.      Breath sounds: Normal breath sounds. Skin:     Capillary Refill: Capillary refill takes less than 2 seconds. Neurological:      General: No focal deficit present. Mental Status: He is alert and oriented to person, place, and time. Psychiatric:         Attention and Perception: Attention normal.         Mood and Affect: Mood is depressed. Behavior: Behavior normal.         Thought Content: Thought content does not include homicidal or suicidal ideation. Thought content does not include homicidal or suicidal plan.          Cognition and Memory: Cognition and memory normal.         Judgment: Judgment normal.       Hospital Outpatient Visit on 12/10/2019   Component Date Value Ref Range Status    Glucose 12/10/2019 131* 70 - 99 mg/dL Final    BUN 12/10/2019 22* 6 - 20 mg/dL Final    CREATININE 12/10/2019 0.83  0.70 - 1.20 mg/dL Final    Bun/Cre Ratio 12/10/2019 27* 9 - 20 Final    Calcium 12/10/2019 9.6  8.6 - 10.4 mg/dL Final    Sodium 12/10/2019 137  135 - 144 mmol/L Final    Potassium 12/10/2019 4.0  3.7 - 5.3 mmol/L Final    Chloride 12/10/2019 102  98 - 107 mmol/L Final    CO2 12/10/2019 25  20 - 31 mmol/L Final    Anion Gap 12/10/2019 10  9 - 17 mmol/L Final    Alkaline Phosphatase 12/10/2019 73  40 - 129 U/L Final    ALT 12/10/2019 34  5 - 41 U/L Final    AST 12/10/2019 23  <40 U/L Final    Total Bilirubin 12/10/2019 0.50  0.3 - 1.2 mg/dL Final    Total Protein 12/10/2019 7.5  6.4 - 8.3 g/dL Final    Alb 12/10/2019 4.5  3.5 - 5.2 g/dL Final    Albumin/Globulin Ratio 12/10/2019 1.5  1.0 - 2.5 Final    GFR Non- 12/10/2019 >60  >60 mL/min Final    GFR  12/10/2019 Information:  Pulmonary Embolism, DVT    RESULTS:    MOLECULAR GENETIC DIAGNOSIS:     Negative for Factor V Leiden Mutation    INTERPRETATION:    The Factor V Leiden mutation (1691GA)  [c.1601G>A([id number].[id number])] was not detected in this study. This patient  may, however, still be at risk for venous thrombosis due to another  genetic predisposition including the Factor II (Prothrombin 51751SL)  mutation or either of the 5, 10-methylenetetrahydrofolate reductase  (MTHFR)  mutations (677T and G5766R)  Additional molecular testing is  available for these mutations in this laboratory. In addition, other  etiologies not studied in this assay may predispose this patient                           to  venous thrombosis. Patients and their families undergoing molecular diagnostic testing  should understand that a normal result for such tests does not  preclude the unlikely possibility of genotyping errors occurring as a  result of rare genetic variants which can interfere with the analysis  or an unusual mutation of the gene(s) being studied. This molecular test has been approved for in vitro diagnostic use by  the U.S. FDA. This test is used for clinical purposes. Pursuant to  the requirements of CLIA '88, this laboratory has established and  verified the test's accuracy and precision. It should not be regarded  as investigational or for research. This laboratory is certified  under the [address] (CLIA  '88) as qualified to perform high complexity clinical laboratory  testing. METHODOLOGY: Factor V Leiden is a single point mutation in the Factor  V gene at nucleotide position 1691 involving a G to A substitution  (1691GA),                            which predicts a single amino acid replacement ([id number])  at one of three activated protein C (APC) cleavage sites in the Factor  Va molecule.   Factor V Leiden is inactivated at an approximately  10-fold slower rate than normal Factor V and persists longer in the  circulation, resulting in increased thrombin generation and a mild  hypercoagulable state. Twenty to 40% of venous thrombosis cases are  associated with this mutation, and more than 95% of cases of  APC-resistance are caused by this mutation. Individuals heterozygous  for the Factor V Leiden mutation have a slightly increased risk  (5-fold to 8-fold) for venous thrombosis, and homozygous individuals  have a much greater (up to 80-fold) thrombotic risk. Patient DNA is assayed for the presence of wild type or mutant gene  sequences in the Factor V gene by the Invader Factor V test that  utilizes [employer] chemistry for detecting gene-specific sequences. Target amplification is followed by the signal generation (In                          vipul)  phase of the assay. The Invader assay employs a genetically  engineered nuclease, known as a Cleavasee enzyme, to recognize and  cleave specific genomic structures formed by the addition of two  oligonucleotide probes [Wild Type (WT) or Mutant (Mut)] to a nucleic  acid target. Fluorescence signal intensity is enhanced through a  second Cleavasee cleavage reaction and fluorescence resonance energy  transfer (FRET), with detection of fluorescent signal using a  fluorescence plate reader.   Invader and Cleavase are registered  trademarks of [employer].  This test is performed pursuant to an  agreement with [employer].          **Electronically Signed Out**         Thor Melendez M.D.            [facility] Prothrombin Mutation 12/10/2019    Final                    [id number]  [facility] [address], [address]   Tel [phone] ([address]) [facility] for Wiley Carlson MD,   Thor Melendez MD  Specimen(s) Received:  Peripheral blood, PTI  Clinical Information:  Pulmonary [place]   ancestry. Patient DNA is assayed for the presence of wild type or mutant gene  sequences in the Factor II (Prothrombin) gene by the Invader Factor II  test that utilizes [employer] chemistry for detecting gene-specific  sequences. Target amplification is followed by the signal generation  (Invader) phase of the assay. The Invader assay employs a genetically  engineered nuclease, known as a Cleavasee enzyme, to recognize and  cleave specific genomic structures formed by the addition of two  oligonucleotide probes [Wild Type (WT) or Mutant (Mut)] to a nucleic  acid target. Fluorescence signal intensity is enhanced through a  second Cleavasee cleavage reaction and fluorescence resonance energy  transfer (FRET), with                           detection of fluorescent signal using a  fluorescence plate reader. Invader and Cleavase are registered  [facility].  This test is performed pursuant to an  agreement with [employer].          **Electronically Signed Out**         Rica Bain M.D.                  ASSESSMENT/PLAN:  1. Essential hypertension  Stable well controlled  Continue current dose of medication     2. Hypotestosteronism  Stable continue current dose of tesosterone  - Testosterone, Free; Future  - Testosterone (ANDROGEL) 20.25 MG/ACT (1.62%) GEL gel; APPLY FOUR PUMPS ONTO THE SKIN DAILY  Dispense: 1 Bottle; Refill: 2    3. Prediabetes  Stable continue to watch diet   Encourage daily exercise  - Comprehensive Metabolic Panel; Future  - Hemoglobin A1C; Future    4. Morbid obesity ([address]  Encouraged healthy diet and increase exercise  - Lipid Panel; Future    5. Depression, unspecified depression type  Will start lexapro 5 mg    6. Anxiety  See above    Pt to return in 8 weeks for follow up  Pt to return PRN      Return in about 8 weeks (around 3/12/2020), or if symptoms worsen or fail to improve.     An electronic signature was used to authenticate this note.    --SHAKIRA Gay - CNP on 1/17/2020 at 3:54 PM

## 2020-01-20 RX ORDER — RIVAROXABAN 20 MG/1
TABLET, FILM COATED ORAL
Qty: 90 TABLET | Refills: 1 | Status: SHIPPED | OUTPATIENT
Start: 2020-01-20 | End: 2020-07-20

## 2020-01-23 ASSESSMENT — ENCOUNTER SYMPTOMS
COUGH: 0
SHORTNESS OF BREATH: 0
WHEEZING: 0

## 2020-03-08 ENCOUNTER — OFFICE VISIT (OUTPATIENT)
Dept: PRIMARY CARE CLINIC | Age: 60
End: 2020-03-08
Payer: COMMERCIAL

## 2020-03-08 VITALS
RESPIRATION RATE: 12 BRPM | HEART RATE: 76 BPM | TEMPERATURE: 98.6 F | WEIGHT: 285.7 LBS | DIASTOLIC BLOOD PRESSURE: 74 MMHG | BODY MASS INDEX: 39.85 KG/M2 | SYSTOLIC BLOOD PRESSURE: 140 MMHG

## 2020-03-08 PROCEDURE — 99213 OFFICE O/P EST LOW 20 MIN: CPT | Performed by: FAMILY MEDICINE

## 2020-03-08 RX ORDER — AMOXICILLIN AND CLAVULANATE POTASSIUM 875; 125 MG/1; MG/1
1 TABLET, FILM COATED ORAL 2 TIMES DAILY
Qty: 20 TABLET | Refills: 0 | Status: SHIPPED | OUTPATIENT
Start: 2020-03-08 | End: 2020-03-18

## 2020-03-08 NOTE — PATIENT INSTRUCTIONS
Patient Education        Swollen Lymph Nodes: Care Instructions  Your Care Instructions    Lymph nodes are small, bean-shaped glands throughout the body. They help your body fight germs and infections. Lymph nodes often swell when there is a problem such as an injury, infection, or tumor. · The nodes in your neck, under your chin, or behind your ears may swell when you have a cold or sore throat. · An injury or infection in a leg or foot can make the nodes in your groin swell. · Sometimes medicine can make lymph nodes swell, but this is rare. Treatment depends on what caused your nodes to swell. Usually the nodes return to normal size without a problem. Follow-up care is a key part of your treatment and safety. Be sure to make and go to all appointments, and call your doctor if you are having problems. It's also a good idea to know your test results and keep a list of the medicines you take. How can you care for yourself at home? · Take your medicines exactly as prescribed. Call your doctor if you think you are having a problem with your medicine. · Avoid irritation. ? Do not squeeze or pick at the lump. ? Do not stick a needle in it. · Prevent infection. Do not squeeze, drain, or puncture a painful lump. Doing this can irritate or inflame the lump, push any existing infection deeper into the skin, or cause severe bleeding. · Get extra rest. Slow down just a little from your usual routine. · Drink plenty of fluids, enough so that your urine is light yellow or clear like water. If you have kidney, heart, or liver disease and have to limit fluids, talk with your doctor before you increase the amount of fluids you drink. · Take an over-the-counter pain medicine, such as acetaminophen (Tylenol), ibuprofen (Advil, Motrin), or naproxen (Aleve). Read and follow all instructions on the label. · Do not take two or more pain medicines at the same time unless the doctor told you to.  Many pain medicines have acetaminophen, which is Tylenol. Too much acetaminophen (Tylenol) can be harmful. When should you call for help? Call your doctor now or seek immediate medical care if:    · You have worse symptoms of infection, such as:  ? Increased pain, swelling, warmth, or redness. ? Red streaks leading from the area. ? Pus draining from the area. ? A fever.    Watch closely for changes in your health, and be sure to contact your doctor if:    · Your lymph nodes do not get smaller or do not return to normal.     · You do not get better as expected. Where can you learn more? Go to https://ETAOI Systems Ltdpepiceweb.Meddik. org and sign in to your [employer] account. Enter I846 in the [employer] box to learn more about \"Swollen Lymph Nodes: Care Instructions. \"     If you do not have an account, please click on the \"Sign Up Now\" link. Current as of: June 9, 2019  Content Version: 12.3  © 7308-7838 Healthwise, Incorporated. Care instructions adapted under license by [facility] ([facility]). If you have questions about a medical condition or this instruction, always ask your healthcare professional. [address] any warranty or liability for your use of this information.

## 2020-03-08 NOTE — PROGRESS NOTES
[facility]             [address] [facility], [place], [address]                        Telephone [phone]             Fax [phone]       Aleksandar Talbert  1960  MRN:  [id number]  Date of visit:  3/8/2020     Subjective:    Aleksandar Talbert is a 64 y.o.  male who presents to [facility] today (3/8/2020) for evaluation of: Mass (back of neck past week, slightly painful, sore but not itchy, hurts to turn head sometimes, about 1 inch in diameter, feels hard, slightly dark red but not raised, history of neck surgery a few years ago)      He states that he noticed a bump on the back of his neck about a week ago. He states that the bump is not tender. He denies any drainage from the area. He denies fever or chills. He states that he has some tenderness at times when he turns his head. He states that he has a tooth problem that he has been seeing his dentist for. He took a course of Amoxicillin without any significant improvement in his symptoms. He is scheduled to see an endodontist this week.        He has the following problem list:  Patient Active Problem List   Diagnosis    Essential hypertension    Depression    Hepatitis C    Erectile dysfunction    Morbid obesity ([address]    Hypotestosteronism    Cervical myelopathy [facility]    Prediabetes    Sleep apnea, obstructive    Acute pulmonary embolism without acute cor pulmonale (HCC)        Current medications are:  Current Outpatient Medications   Medication Sig Dispense Refill    metFORMIN (GLUCOPHAGE) 500 MG tablet Take 2 tablets by mouth daily (with breakfast) 180 tablet 2    XARELTO 20 MG TABS tablet TAKE ONE TABLET BY MOUTH DAILY WITH BREAKFAST 90 tablet 1    Testosterone (ANDROGEL) 20.25 MG/ACT (1.62%) GEL gel APPLY FOUR PUMPS ONTO THE SKIN DAILY 1 Bottle 2    sildenafil (VIAGRA) 50 MG tablet TAKE 1 TABLET BY MOUTH AS NEEDED FOR  ERECTILE DYSFUNCTION 30 tablet 1    buPROPion (WELLBUTRIN XL) 300 MG extended release tablet Take 1 tablet by mouth every morning 90 tablet 1    lisinopril-hydrochlorothiazide (PRINZIDE;ZESTORETIC) 20-25 MG per tablet Take 1 tablet by mouth daily 90 tablet 3    therapeutic multivitamin-minerals (THERAGRAN-M) tablet Take 1 tablet by mouth daily. No current facility-administered medications for this visit. He is allergic to gabapentin. He also has seasonal allergies. He  reports that he quit smoking about 27 years ago. His smoking use included cigarettes. He has a 13.50 pack-year smoking history. He quit smokeless tobacco use about 29 years ago. Objective:    Vitals:    03/08/20 1404 03/08/20 1408   BP: (!) 150/84 (!) 140/74   Site: Left Upper Arm Left Upper Arm   Position: Sitting Sitting   Cuff Size: Large Adult Large Adult   Pulse: 76 76   Resp: 12 12   Temp: 98.6 °F (37 °C) 98.6 °F (37 °C)   TempSrc: Tympanic Tympanic   Weight: 285 lb 11.2 oz (129.6 kg) 285 lb 11.2 oz (129.6 kg)     Body mass index is 39.85 kg/m². Well-nourished, well-developed obese  male, in no acute distress. The tympanic membranes and oropharynx are clear bilaterally. The frontal and maxillary sinuses are non-tender to palpation bilaterally. The neck has a mildly tender enlarged lymph node in the posterior cervical chain on the left. There are no other enlarged lymph nodes palpated. The chest is clear to auscultation bilaterally. Heart sounds are regular without murmur. Assessment and Plan:    1. Enlarged lymph node in neck  I discussed with the patient that this is most likely a reactive lymph node. Augmentin was prescribed due to history of dental infection and his recent course of Amoxicillin:  - amoxicillin-clavulanate (AUGMENTIN) 875-125 MG per tablet; Take 1 tablet by mouth 2 times daily for 10 days  Dispense: 20 tablet; Refill: 0    He was advised to follow up with the endodontist as scheduled.   He was advised to follow up with his PCP if his symptoms persist.  Printed information regarding Swollen Lymph Nodes was provided to the patient with his after visit summary. 2. Elevated blood pressure reading  He was advised to monitor his blood pressure and follow up with his PCP.       (Please note that portions of this note were completed with a voice-recognition program. Efforts were made to edit the dictation but occasionally words are mis-transcribed.)

## 2020-03-16 RX ORDER — TESTOSTERONE 16.2 MG/G
GEL TRANSDERMAL
Qty: 1 BOTTLE | Refills: 0 | Status: SHIPPED | OUTPATIENT
Start: 2020-03-16 | End: 2020-04-07

## 2020-03-16 NOTE — TELEPHONE ENCOUNTER
Medication refilled    Controlled Substance Monitoring:    Acute and Chronic Pain Monitoring:   RX Monitoring 11/25/2019   Attestation -   Periodic Controlled Substance Monitoring No signs of potential drug abuse or diversion identified.;Obtaining appropriate analgesic effect of treatment.

## 2020-04-07 RX ORDER — TESTOSTERONE 16.2 MG/G
GEL TRANSDERMAL
Qty: 1 BOTTLE | Refills: 0 | Status: SHIPPED | OUTPATIENT
Start: 2020-04-13 | End: 2020-07-30

## 2020-04-07 NOTE — TELEPHONE ENCOUNTER
Anitha Mars called requesting a refill of the below medication which has been pended for you:     Requested Prescriptions     Pending Prescriptions Disp Refills    Testosterone (ANDROGEL) 20.25 MG/ACT (1.62%) GEL gel [Pharmacy Med Name: TESTOSTERONE 1.62% GEL PUMP] 1 Bottle 0     Sig: APPLY FOUR PUMPS ONTO THE SKIN DAILY       Last Appointment Date: 1/16/2020  Next Appointment Date: Visit date not found    Allergies   Allergen Reactions    Gabapentin      GI side effects    Seasonal Other (See Comments)     RUNNY NOSE, WATERY EYES   Per [facility] last refill 03/17/2020 #150 for a 30 day supply

## 2020-04-23 ENCOUNTER — TELEPHONE (OUTPATIENT)
Dept: FAMILY MEDICINE CLINIC | Age: 60
End: 2020-04-23

## 2020-05-04 RX ORDER — BUPROPION HYDROCHLORIDE 300 MG/1
TABLET ORAL
Qty: 30 TABLET | Refills: 3 | Status: SHIPPED | OUTPATIENT
Start: 2020-05-04 | End: 2020-11-23

## 2020-06-29 ENCOUNTER — OFFICE VISIT (OUTPATIENT)
Dept: FAMILY MEDICINE CLINIC | Age: 60
End: 2020-06-29
Payer: COMMERCIAL

## 2020-06-29 VITALS
TEMPERATURE: 96.4 F | HEART RATE: 107 BPM | DIASTOLIC BLOOD PRESSURE: 78 MMHG | WEIGHT: 286 LBS | BODY MASS INDEX: 40.04 KG/M2 | OXYGEN SATURATION: 97 % | HEIGHT: 71 IN | SYSTOLIC BLOOD PRESSURE: 148 MMHG

## 2020-06-29 PROCEDURE — 99213 OFFICE O/P EST LOW 20 MIN: CPT | Performed by: NURSE PRACTITIONER

## 2020-06-29 RX ORDER — LEVOFLOXACIN 500 MG/1
500 TABLET, FILM COATED ORAL DAILY
Qty: 10 TABLET | Refills: 0 | Status: SHIPPED | OUTPATIENT
Start: 2020-06-29 | End: 2020-07-09

## 2020-06-29 NOTE — PROGRESS NOTES
Subjective:      Patient ID: Maria Alejandra Zeng is a 64 y.o. male. Testicle Pain   This is a new problem. The current episode started in the past 7 days. The problem occurs daily. The problem has been unchanged. Pertinent negatives include no abdominal pain, fatigue, fever, nausea, urinary symptoms or vomiting. The symptoms are aggravated by exertion and intercourse. He has tried nothing for the symptoms. The treatment provided no relief.         Past Medical History:   Diagnosis Date    Cervical disc disease     Depression     Diabetes mellitus ([address]     PREDIABETIC    Erectile dysfunction     Hepatitis C     tx 6mon completed 8/913 Dr Tavares Lal TREATMENT    History of gastroesophageal reflux (GERD)     Hypertension     Hypotestosteronism     Impaired fasting glucose     Laceration 05/06/2017    TOP OF HEAD    Neck pain     Obesity     Sleep apnea     CPAP MACHINE    Substance abuse in remission ([facility]     IV DRUG USER 30 YEARS AGO    Substance abuse in remission ([facility]     ALCOHOLIC 20 YEARS    Testicular hypofunction 2015    Wears glasses        Past Surgical History:   Procedure Laterality Date    CERVICAL FUSION  05/09/2017    C5-7    CERVICAL FUSION N/A 5/9/2017     ANTERIOR CERVICAL DECOMPRESSION FUSION C5-C7 performed by Sanjana Oneill MD at [address] [place] [address]  06/18/2014    polyps    COLONOSCOPY  9/2015    sigmoid diverticulosis    HAMMER TOE SURGERY Left     2ND  AND 3RD TOE  [facility] 12/20/12    LIVER BIOPSY      TYMPANIC MEMBRANE REPAIR      MULTIPLE AS A CHILD       Social History     Socioeconomic History    Marital status:      Spouse name: None    Number of children: None    Years of education: None    Highest education level: None   Occupational History    None   Social Needs    Financial resource strain: None    Food insecurity     Worry: None     Inability: None    Transportation needs     Medical: None     Non-medical: None   Tobacco Use    Smoking status: Former Smoker     Packs/day: 1.50     Years: 9.00     Pack years: 13.50     Types: Cigarettes     Last attempt to quit: 1992     Years since quittin.9    Smokeless tobacco: Former User     Quit date: 1990    Tobacco comment: Rebecca Middleton RRT 19   Substance and Sexual Activity    Alcohol use: No     Alcohol/week: 0.0 standard drinks     Comment: HISTORY OF ALCOHOL ABUSE 30 YEARS AGO quit     Drug use: No     Types: IV     Comment: HISTORY OF IV DRUG USE 30 YEARS AGO    Sexual activity: None   Lifestyle    Physical activity     Days per week: None     Minutes per session: None    Stress: None   Relationships    Social connections     Talks on phone: None     Gets together: None     Attends [employer] service: None     Active member of club or organization: None     Attends meetings of clubs or organizations: None     Relationship status: None    Intimate partner violence     Fear of current or ex partner: None     Emotionally abused: None     Physically abused: None     Forced sexual activity: None   Other Topics Concern    None   Social History Narrative    None       Family History   Problem Relation Age of Onset    High Blood Pressure Mother     Diabetes Mother     Cataracts Mother     Cancer Father     Stroke Father     Other Brother         HEPATITIS C    Diabetes Sister     Diabetes Brother     Glaucoma Neg Hx        Allergies   Allergen Reactions    Gabapentin      GI side effects    Seasonal Other (See Comments)     RUNNY NOSE, WATERY EYES       Current Outpatient Medications   Medication Sig Dispense Refill    levoFLOXacin (LEVAQUIN) 500 MG tablet Take 1 tablet by mouth daily for 10 days Take with food.  (Patient not taking: Reported on 2020) 10 tablet 0    buPROPion (WELLBUTRIN XL) 300 MG extended release tablet TAKE ONE TABLET BY MOUTH EVERY MORNING 30 tablet 3    XARELTO 20 MG TABS tablet TAKE ONE TABLET BY MOUTH DAILY WITH BREAKFAST 90 tablet 1    sildenafil (VIAGRA) 50 MG tablet TAKE 1 TABLET BY MOUTH AS NEEDED FOR  ERECTILE DYSFUNCTION 30 tablet 1    lisinopril-hydrochlorothiazide (PRINZIDE;ZESTORETIC) 20-25 MG per tablet Take 1 tablet by mouth daily 90 tablet 3    therapeutic multivitamin-minerals (THERAGRAN-M) tablet Take 1 tablet by mouth daily.  Testosterone (ANDROGEL) 20.25 MG/ACT (1.62%) GEL gel APPLY FOUR PUMPS ONTO THE SKIN DAILY 1 Bottle 0    metFORMIN (GLUCOPHAGE) 500 MG tablet Take 2 tablets by mouth daily (with breakfast) 180 tablet 2     No current facility-administered medications for this visit. Review of Systems   Constitutional: Negative for activity change, appetite change, fatigue and fever. Gastrointestinal: Negative for abdominal pain, nausea and vomiting. Genitourinary: Positive for testicular pain. Negative for difficulty urinating, discharge, dysuria, penile pain, penile swelling, scrotal swelling and urgency. Objective:   Physical Exam  Vitals signs and nursing note reviewed. Constitutional:       Appearance: Normal appearance. He is obese. HENT:      Head: Normocephalic and atraumatic. Cardiovascular:      Rate and Rhythm: Normal rate and regular rhythm. Heart sounds: Normal heart sounds. Pulmonary:      Effort: Pulmonary effort is normal.      Breath sounds: Normal breath sounds. Abdominal:      Hernia: There is no hernia in the right inguinal area or left inguinal area. Genitourinary:     Scrotum/Testes:         Right: Mass, tenderness, testicular hydrocele or varicocele not present. Left: Tenderness present. Mass, swelling, testicular hydrocele or varicocele not present. Epididymis:      Right: Normal.      Left: Tenderness present. Neurological:      Mental Status: He is alert. Assessment:       Diagnosis Orders   1.  Testicular pain, left  US SCROTUM AND TESTICLES           Plan:      Suspected orchitis   Will US the testicles  Will start levaquin 500mg 1 tablet daily for 10 days  Return if symptoms do not improve or worsen   Return PRN         Cassie Nice, APRN - CNP

## 2020-06-30 ENCOUNTER — HOSPITAL ENCOUNTER (OUTPATIENT)
Dept: INTERVENTIONAL RADIOLOGY/VASCULAR | Age: 60
Discharge: HOME | End: 2020-07-02
Payer: COMMERCIAL

## 2020-06-30 ENCOUNTER — OFFICE VISIT (OUTPATIENT)
Dept: ONCOLOGY | Age: 60
End: 2020-06-30
Payer: COMMERCIAL

## 2020-06-30 VITALS
HEIGHT: 71 IN | DIASTOLIC BLOOD PRESSURE: 84 MMHG | WEIGHT: 295.2 LBS | OXYGEN SATURATION: 97 % | BODY MASS INDEX: 41.33 KG/M2 | SYSTOLIC BLOOD PRESSURE: 138 MMHG | TEMPERATURE: 97.1 F | HEART RATE: 89 BPM

## 2020-06-30 PROCEDURE — 76870 US EXAM SCROTUM: CPT

## 2020-06-30 PROCEDURE — 99214 OFFICE O/P EST MOD 30 MIN: CPT | Performed by: INTERNAL MEDICINE

## 2020-06-30 NOTE — PROGRESS NOTES
Claudia Angeles                                                                                                                  6/30/2020  MRN:   [id number]  YOB: 1960  PCP:                           SHAKIRA Key - CNP  Referring Physician: No ref. provider found  Treating Physician Name: Sajan Baker MD      Reason for visit:  Chief Complaint   Patient presents with    6 Month Follow-Up     6 month follow up        Current problems/ Active and recent treatments:  Right femoral, popliteal and gastrocnemius DVT  Segmental and subsegmental pulmonary embolism. Interval history:     Patient presents to the clinic for a follow-up visit and to discuss further treatment plan. Patient continues to be on Xarelto. He has been tolerating it without any severe adverse events. Denies any major bleeding episode. Continues to be on testosterone supplement. Patient BMI has increased to 41. Denies smoking. Does complain of some pain in his right leg on and off. During this visit patient's allergy, social, medical, surgical history and medications were reviewed and updated. Summary of Case/History:    Claudia Angeles a 64 y. o.male presents to the office to establish care and for further evaluation treatment recommendation regarding anticoagulation for patient's acute DVT and pulmonary embolism  Patient started noticing swelling of his right lower extremity back in July 2019. Work-up including ultrasound of lower extremity confirmed right lower extremity DVT involving femoral popliteal and gastrocnemius vein. Additional work-up including CT chest showed segmental and subsegmental very embolism. Patient was started on anticoagulation and discharged home on Xarelto. Patient recalls any surgery travel trauma before the pulmonary embolism and DVT. Patient does not smoke. He does not give any personal history of previous venous thrombi embolism.   Does not give any family history of venous thrombi embolism. Patient is on testosterone supplementation. His BMI is 39.4. Patient has been tolerating anticoagulation without any complications or severe side effects.       Past Medical History:   Past Medical History:   Diagnosis Date    Cervical disc disease     Depression     Diabetes mellitus ([facility]     PREDIABETIC    Erectile dysfunction     Hepatitis C     tx 6mon completed  Dr Timothy Renner TREATMENT    History of gastroesophageal reflux (GERD)     Hypertension     Hypotestosteronism     Impaired fasting glucose     Laceration 2017    TOP OF HEAD    Neck pain     Obesity     Sleep apnea     CPAP MACHINE    Substance abuse in remission (HCC)     IV DRUG USER 30 YEARS AGO    Substance abuse in remission ([facility]     ALCOHOLIC 20 YEARS    Testicular hypofunction     Wears glasses        Past Surgical History:     Past Surgical History:   Procedure Laterality Date    CERVICAL FUSION  2017    C5-7    CERVICAL FUSION N/A 2017     ANTERIOR CERVICAL DECOMPRESSION FUSION C5-C7 performed by Otis Sullivan MD at [place] [address]  2014    polyps    COLONOSCOPY  2015    sigmoid diverticulosis    HAMMER TOE SURGERY Left     2ND  AND 3RD TO[facility]    LIVER BIOPSY      TYMPANIC MEMBRANE REPAIR      MULTIPLE AS A CHILD       Patient Family Social History:     Social History     Socioeconomic History    Marital status:      Spouse name: None    Number of children: None    Years of education: None    Highest education level: None   Occupational History    None   Social Needs    Financial resource strain: None    Food insecurity     Worry: None     Inability: None    Transportation needs     Medical: None     Non-medical: None   Tobacco Use    Smoking status: Former Smoker     Packs/day: 1.50     Years: 9.00     Pack years: 13.50     Types: Cigarettes     Last attempt to quit: 1992     Years since quittin.8 therapeutic multivitamin-minerals (THERAGRAN-M) tablet Take 1 tablet by mouth daily.  levoFLOXacin (LEVAQUIN) 500 MG tablet Take 1 tablet by mouth daily for 10 days Take with food. (Patient not taking: Reported on 6/30/2020) 10 tablet 0     No current facility-administered medications for this visit. Allergies:   Gabapentin and Seasonal    Review of Systems:    Constitutional: No fever or chills. No night sweats, no weight loss   Eyes: No eye discharge, double vision, or eye pain   HEENT: negative for sore mouth, sore throat, hoarseness and voice change   Respiratory: negative for cough , sputum, dyspnea, wheezing, hemoptysis, chest pain   Cardiovascular: negative for chest pain, dyspnea, palpitations, orthopnea, PND   Gastrointestinal: negative for nausea, vomiting, diarrhea, constipation, abdominal pain, Dysphagia, hematemesis and hematochezia   Genitourinary: negative for frequency, dysuria, nocturia, urinary incontinence, and hematuria   Integument: negative for rash, skin lesions, bruises.    Hematologic/Lymphatic: negative for easy bruising, bleeding, lymphadenopathy, or petechiae   Endocrine: negative for heat or cold intolerance,weight changes, change in bowel habits and hair loss   Musculoskeletal: negative for myalgias, arthralgias, pain, joint swelling,and bone pain   Neurological: negative for headaches, dizziness, seizures, weakness, numbness        Physical Exam:    Vitals: /84 (Site: Right Upper Arm, Position: Sitting, Cuff Size: Medium Adult)   Pulse 89   Temp 97.1 °F (36.2 °C)   Ht 5' 10.98\" (1.803 m)   Wt 295 lb 3.2 oz (133.9 kg)   SpO2 97%   BMI 41.19 kg/m²   General appearance - well appearing, no in pain or distress  Mental status - AAO X3  Eyes - pupils equal and reactive, extraocular eye movements intact  Mouth - mucous membranes moist, pharynx normal without lesions  Neck - supple, no significant adenopathy  Lymphatics - no palpable lymphadenopathy, no pulmonary infarction or right   heart strain.       Findings were discussed with Inis Libman at 10:31 am on 7/1/2019.                 Impression:  Right lower extremity DVT involving femoral popliteal and gastrocnemius vein. Right segmental and subsegmental pulmonary embolism. Testosterone supplementation  BMI of 41.1  Hypertension  Depression    Plan:  Personally reviewed results of lab work-up and other relevant clinical data. Patient thrombophilia work-up including factor V Leyden and prothrombin gene mutation were negative. Patient does not give a history of risk factors such as travel trauma surgery. Denies prior personal or family history of venous thrombi embolism. Discussed data regarding association of testosterone supplementation with DVT. Testosterone supplementation is considered as a mild risk factor. There is conflicting data with testosterone contributes to venous thromboembolism with or without erythrocytosis and also whether testosterone was initiated within 6 months or before 6 months the index event of venous thromboembolism. Some experts consider testosterone supplementation risk factor for venous thromboembolism regardless of duration and hematocrit. Patient's other risk factor includes advancing age and BMI of 41.1. I believe patient's risk factors include obesity testosterone and advancing age. Patient is not willing to go off the testosterone supplements. I discussed risk and benefit of discontinuation of anticoagulation. Patient places more importance on avoiding any more episodes of venous thromboembolism and prefers to stay on anticoagulation as well as a testosterone supplements. Patient continues to complain of pain in his leg. We will obtain ultrasound of lower extremity a week before he comes back to the office    Based on the above discussion we decided to continue anticoagulation. We will continue to monitor patient and reassess risk and benefit.   We will see patient back in office 6 months. Patient had multiple questions which answered to the best of my ability. Logan Garcia        This note is created with the assistance of a speech recognition program.  While intending to generate a document that actually reflects the content of the visit, the document can still have some errors including those of syntax and sound a like substitutions which may escape proof reading. It such instances, actual meaning can be extrapolated by contextual diversion.

## 2020-07-06 ASSESSMENT — ENCOUNTER SYMPTOMS
NAUSEA: 0
VOMITING: 0
ABDOMINAL PAIN: 0

## 2020-07-18 ENCOUNTER — TELEPHONE (OUTPATIENT)
Dept: FAMILY MEDICINE CLINIC | Age: 60
End: 2020-07-18

## 2020-07-20 RX ORDER — RIVAROXABAN 20 MG/1
TABLET, FILM COATED ORAL
Qty: 90 TABLET | Refills: 1 | Status: SHIPPED | OUTPATIENT
Start: 2020-07-20 | End: 2021-04-28

## 2020-07-20 NOTE — TELEPHONE ENCOUNTER
David Hernandez called requesting a refill of the below medication which has been pended for you:     Requested Prescriptions     Pending Prescriptions Disp Refills    XARELTO 20 MG TABS tablet [Pharmacy Med Name: Gilesjessica Hector 20 MG TABLET] 90 tablet 0     Sig: TAKE ONE TABLET BY MOUTH DAILY WITH BREAKFAST       Last Appointment Date: 6/29/2020  Next Appointment Date: Visit date not found    Allergies   Allergen Reactions    Gabapentin      GI side effects    Seasonal Other (See Comments)     RUNNY NOSE, WATERY EYES

## 2020-07-22 ENCOUNTER — OFFICE VISIT (OUTPATIENT)
Dept: PRIMARY CARE CLINIC | Age: 60
End: 2020-07-22
Payer: COMMERCIAL

## 2020-07-22 VITALS
BODY MASS INDEX: 40.05 KG/M2 | OXYGEN SATURATION: 98 % | WEIGHT: 287 LBS | HEART RATE: 74 BPM | TEMPERATURE: 99.2 F | DIASTOLIC BLOOD PRESSURE: 74 MMHG | SYSTOLIC BLOOD PRESSURE: 124 MMHG

## 2020-07-22 PROCEDURE — 99213 OFFICE O/P EST LOW 20 MIN: CPT | Performed by: FAMILY MEDICINE

## 2020-07-22 RX ORDER — CIPROFLOXACIN 500 MG/1
500 TABLET, FILM COATED ORAL 2 TIMES DAILY
Qty: 42 TABLET | Refills: 0 | Status: SHIPPED | OUTPATIENT
Start: 2020-07-22 | End: 2020-11-23

## 2020-07-22 ASSESSMENT — PATIENT HEALTH QUESTIONNAIRE - PHQ9
SUM OF ALL RESPONSES TO PHQ QUESTIONS 1-9: 0
SUM OF ALL RESPONSES TO PHQ QUESTIONS 1-9: 0
SUM OF ALL RESPONSES TO PHQ9 QUESTIONS 1 & 2: 0
2. FEELING DOWN, DEPRESSED OR HOPELESS: 0
1. LITTLE INTEREST OR PLEASURE IN DOING THINGS: 0

## 2020-07-22 ASSESSMENT — ENCOUNTER SYMPTOMS
RESPIRATORY NEGATIVE: 1
ABDOMINAL PAIN: 0
CHANGE IN BOWEL HABIT: 0
EYES NEGATIVE: 1

## 2020-07-22 NOTE — PROGRESS NOTES
Take 1 tablet by mouth daily. Yes Historical Provider, MD   buPROPion (WELLBUTRIN XL) 300 MG extended release tablet TAKE ONE TABLET BY MOUTH EVERY MORNING  Patient not taking: Reported on 2020  [facility] APRDAVIE - CNP   Testosterone (ANDROGEL) 20.25 MG/ACT (1.62%) GEL gel APPLY FOUR PUMPS ONTO THE SKIN DAILY  [facility] SHAKIRA - CNP   metFORMIN (GLUCOPHAGE) 500 MG tablet Take 2 tablets by mouth daily (with breakfast)  [facility]AKIR[facility] - DONTRELL        Social History     Tobacco Use    Smoking status: Former Smoker     Packs/day: 1.50     Years: 9.00     Pack years: 13.50     Types: Cigarettes     Last attempt to quit: 1992     Years since quittin.9    Smokeless tobacco: Former User     Quit date: 1990    Tobacco comment: Dayanna Verma RRT 19   Substance Use Topics    Alcohol use: No     Alcohol/week: 0.0 standard drinks     Comment: HISTORY OF ALCOHOL ABUSE 30 YEARS AGO quit         Vitals:    20 1201   BP: 124/74   Site: Left Upper Arm   Position: Sitting   Cuff Size: Large Adult   Pulse: 74   Temp: 99.2 °F (37.3 °C)   TempSrc: Tympanic   SpO2: 98%   Weight: 287 lb (130.2 kg)     Estimated body mass index is 40.05 kg/m² as calculated from the following:    Height as of 20: 5' 10.98\" (1.803 m). Weight as of this encounter: 287 lb (130.2 kg). Physical Exam  Vitals signs and nursing note reviewed. Constitutional:       General: He is not in acute distress. Appearance: He is well-developed. He is not diaphoretic. HENT:      Head: Normocephalic and atraumatic. Eyes:      Conjunctiva/sclera: Conjunctivae normal.   Neck:      Musculoskeletal: Normal range of motion. Pulmonary:      Effort: Pulmonary effort is normal.   Genitourinary:     Penis: Normal.       Scrotum/Testes: Normal.      Comments: Left testicle without swelling but mild pain with palpation to the posterior aspect of the testicle.   Skin:     General: Skin is warm and dry. Coloration: Skin is not pale. Findings: No erythema or rash. Neurological:      Mental Status: He is alert and oriented to person, place, and time. Psychiatric:         Behavior: Behavior normal.         Thought Content: Thought content normal.         Judgment: Judgment normal.         ASSESSMENT/PLAN:  Encounter Diagnoses   Name Primary?  Epididymitis Yes    Screening for prostate cancer      Orders Placed This Encounter   Medications    ciprofloxacin (CIPRO) 500 MG tablet     Sig: Take 1 tablet by mouth 2 times daily     Dispense:  42 tablet     Refill:  0     Suspect underlying epididymitis. Testicle with mild pain, but otherwise normal.  Patient notes that he had been treated with a prolonged course of cipro when this had happened in the past and it did improve. Tylenol/Motrin prn    Return  if no improvement in symptoms or if any further symptoms arise. An electronic signature was used to authenticate this note.     --Ebenezer Wesley,  on 7/22/2020 at 12:08 PM

## 2020-07-22 NOTE — LETTER
[address] [facility] A department of [place] [address]  Phone: [phone]  Fax: [address],       July 22, 2020    Patient:   Pat Vazquez  Date of Birth   1960  Date of visit   7/22/2020        To Whom it May Concern:      Pat Vazquez was seen in my clinic on 7/22/2020. Please excuse from work 7/22/20- 7/24/20 due to acute medical illness. Can return to work duties 7/25/20 without restriction. If you have any questions or concerns, please don't hesitate to call.       Sincerely,      Melinda Dela Cruz, DO

## 2020-07-23 ENCOUNTER — HOSPITAL ENCOUNTER (OUTPATIENT)
Dept: LAB | Age: 60
Discharge: HOME | End: 2020-07-23
Payer: COMMERCIAL

## 2020-07-23 LAB
ABSOLUTE EOS #: 0.2 K/UL (ref 0–0.44)
ABSOLUTE IMMATURE GRANULOCYTE: 0.03 K/UL (ref 0–0.3)
ABSOLUTE LYMPH #: 1.68 K/UL (ref 1.1–3.7)
ABSOLUTE MONO #: 0.4 K/UL (ref 0.1–1.2)
ALBUMIN SERPL-MCNC: 4.2 G/DL (ref 3.5–5.2)
ALBUMIN/GLOBULIN RATIO: 1.4 (ref 1–2.5)
ALP BLD-CCNC: 89 U/L (ref 40–129)
ALT SERPL-CCNC: 77 U/L (ref 5–41)
ANION GAP SERPL CALCULATED.3IONS-SCNC: 12 MMOL/L (ref 9–17)
AST SERPL-CCNC: 54 U/L
BASOPHILS # BLD: 1 % (ref 0–2)
BASOPHILS ABSOLUTE: 0.05 K/UL (ref 0–0.2)
BILIRUB SERPL-MCNC: 0.48 MG/DL (ref 0.3–1.2)
BUN BLDV-MCNC: 18 MG/DL (ref 6–20)
BUN/CREAT BLD: 20 (ref 9–20)
C-REACTIVE PROTEIN: 4.2 MG/L (ref 0–5)
CALCIUM SERPL-MCNC: 9.5 MG/DL (ref 8.6–10.4)
CHLORIDE BLD-SCNC: 99 MMOL/L (ref 98–107)
CHOLESTEROL/HDL RATIO: 6.2
CHOLESTEROL: 161 MG/DL
CO2: 27 MMOL/L (ref 20–31)
CREAT SERPL-MCNC: 0.89 MG/DL (ref 0.7–1.2)
DIFFERENTIAL TYPE: ABNORMAL
EOSINOPHILS RELATIVE PERCENT: 3 % (ref 1–4)
ESTIMATED AVERAGE GLUCOSE: 143 MG/DL
GFR AFRICAN AMERICAN: >60 ML/MIN
GFR NON-AFRICAN AMERICAN: >60 ML/MIN
GFR SERPL CREATININE-BSD FRML MDRD: ABNORMAL ML/MIN/{1.73_M2}
GFR SERPL CREATININE-BSD FRML MDRD: ABNORMAL ML/MIN/{1.73_M2}
GLUCOSE BLD-MCNC: 174 MG/DL (ref 70–99)
HBA1C MFR BLD: 6.6 % (ref 4.8–5.9)
HCT VFR BLD CALC: 50.3 % (ref 40.7–50.3)
HDLC SERPL-MCNC: 26 MG/DL
HEMOGLOBIN: 16.8 G/DL (ref 13–17)
IMMATURE GRANULOCYTES: 1 %
LDL CHOLESTEROL: 87 MG/DL (ref 0–130)
LYMPHOCYTES # BLD: 28 % (ref 24–43)
MCH RBC QN AUTO: 29 PG (ref 25.2–33.5)
MCHC RBC AUTO-ENTMCNC: 33.4 G/DL (ref 25.2–33.5)
MCV RBC AUTO: 86.9 FL (ref 82.6–102.9)
MONOCYTES # BLD: 7 % (ref 3–12)
NRBC AUTOMATED: 0 PER 100 WBC
PDW BLD-RTO: 13.5 % (ref 11.8–14.4)
PLATELET # BLD: 190 K/UL (ref 138–453)
PLATELET ESTIMATE: ABNORMAL
PMV BLD AUTO: 9.2 FL (ref 8.1–13.5)
POTASSIUM SERPL-SCNC: 4.4 MMOL/L (ref 3.7–5.3)
PROSTATE SPECIFIC ANTIGEN: 0.78 UG/L
RBC # BLD: 5.79 M/UL (ref 4.21–5.77)
RBC # BLD: ABNORMAL 10*6/UL
RHEUMATOID FACTOR: <10 IU/ML
SEG NEUTROPHILS: 60 % (ref 36–65)
SEGMENTED NEUTROPHILS ABSOLUTE COUNT: 3.73 K/UL (ref 1.5–8.1)
SEX HORMONE BINDING GLOBULIN: 18 NMOL/L (ref 11–80)
SODIUM BLD-SCNC: 138 MMOL/L (ref 135–144)
TESTOSTERONE FREE-NONMALE: 226.5 PG/ML (ref 47–244)
TESTOSTERONE TOTAL: 759 NG/DL (ref 220–1000)
TOTAL PROTEIN: 7.1 G/DL (ref 6.4–8.3)
TRIGL SERPL-MCNC: 240 MG/DL
VLDLC SERPL CALC-MCNC: ABNORMAL MG/DL (ref 1–30)
WBC # BLD: 6.1 K/UL (ref 3.5–11.3)
WBC # BLD: ABNORMAL 10*3/UL

## 2020-07-23 PROCEDURE — 86431 RHEUMATOID FACTOR QUANT: CPT

## 2020-07-23 PROCEDURE — 86140 C-REACTIVE PROTEIN: CPT

## 2020-07-23 PROCEDURE — 84403 ASSAY OF TOTAL TESTOSTERONE: CPT

## 2020-07-23 PROCEDURE — 86225 DNA ANTIBODY NATIVE: CPT

## 2020-07-23 PROCEDURE — 86038 ANTINUCLEAR ANTIBODIES: CPT

## 2020-07-23 PROCEDURE — 86235 NUCLEAR ANTIGEN ANTIBODY: CPT

## 2020-07-23 PROCEDURE — 80053 COMPREHEN METABOLIC PANEL: CPT

## 2020-07-23 PROCEDURE — 36415 COLL VENOUS BLD VENIPUNCTURE: CPT

## 2020-07-23 PROCEDURE — 83036 HEMOGLOBIN GLYCOSYLATED A1C: CPT

## 2020-07-23 PROCEDURE — 85025 COMPLETE CBC W/AUTO DIFF WBC: CPT

## 2020-07-23 PROCEDURE — 84270 ASSAY OF SEX HORMONE GLOBUL: CPT

## 2020-07-23 PROCEDURE — 80061 LIPID PANEL: CPT

## 2020-07-23 PROCEDURE — G0103 PSA SCREENING: HCPCS

## 2020-07-24 LAB
ANA REFERENCE RANGE:: ABNORMAL
ANTI DNA DOUBLE STRANDED: 150 IU/ML
ANTI JO-1 IGG: 10 U/ML
ANTI RNP AB: 151 U/ML
ANTI SSA: 11 U/ML
ANTI SSB: 16 U/ML
ANTI-CENTROMERE: 4 U/ML
ANTI-NUCLEAR ANTIBODY (ANA): POSITIVE
ANTI-SCLERODERMA: 17 U/ML
ANTI-SMITH: 9 U/ML
HISTONE ANTIBODY: 69 U/ML

## 2020-07-30 RX ORDER — TESTOSTERONE 16.2 MG/G
GEL TRANSDERMAL
Qty: 1 BOTTLE | Refills: 3 | Status: SHIPPED | OUTPATIENT
Start: 2020-07-30 | End: 2020-07-31 | Stop reason: SDUPTHER

## 2020-07-30 NOTE — TELEPHONE ENCOUNTER
Dustin Nowak called requesting a refill of the below medication which has been pended for you:     Requested Prescriptions     Pending Prescriptions Disp Refills    Testosterone (ANDROGEL) 20.25 MG/ACT (1.62%) GEL gel [Pharmacy Med Name: TESTOSTERONE 1.62% GEL PUMP] 1 Bottle 1     Sig: APPLY 4 PUMP ACTUATIONS  TOPICALLY ONCE DAILY       Last Appointment Date: 6/29/2020  Next Appointment Date: Visit date not found    Allergies   Allergen Reactions    Gabapentin      GI side effects    Seasonal Other (See Comments)     RUNNY NOSE, WATERY EYES

## 2020-07-30 NOTE — TELEPHONE ENCOUNTER
Medication refilled. Pt needs a medication contract. Controlled Substance Monitoring:    Acute and Chronic Pain Monitoring:   RX Monitoring 7/30/2020   Attestation -   Periodic Controlled Substance Monitoring No signs of potential drug abuse or diversion identified.;Obtaining appropriate analgesic effect of treatment.

## 2020-07-31 RX ORDER — TESTOSTERONE 16.2 MG/G
GEL TRANSDERMAL
Qty: 120 ACTUATION | Refills: 2 | Status: SHIPPED | OUTPATIENT
Start: 2020-07-31 | End: 2021-01-15 | Stop reason: SDUPTHER

## 2020-07-31 NOTE — TELEPHONE ENCOUNTER
Medication sent    Controlled Substance Monitoring:    Acute and Chronic Pain Monitoring:   RX Monitoring 7/31/2020   Attestation -   Periodic Controlled Substance Monitoring No signs of potential drug abuse or diversion identified.;Obtaining appropriate analgesic effect of treatment.

## 2020-08-03 ENCOUNTER — TELEPHONE (OUTPATIENT)
Dept: FAMILY MEDICINE CLINIC | Age: 60
End: 2020-08-03

## 2020-08-03 RX ORDER — ATORVASTATIN CALCIUM 20 MG/1
20 TABLET, FILM COATED ORAL DAILY
Qty: 30 TABLET | Refills: 3 | Status: SHIPPED | OUTPATIENT
Start: 2020-08-03 | End: 2020-11-23

## 2020-08-03 NOTE — TELEPHONE ENCOUNTER
----- Message from SHAKIRA Decker CNP sent at 8/1/2020  3:44 PM EDT -----  CBC is normal. His blood sugar and A1C are higher this time than the last lab results  Encouraged healthy diet and increase daily exercise. His triglycerides are elevated as well. I would like start lipitor 20mg daily. Recheck AST and Lipid panel in 3 months. His testosterone is WNL. He is to continue current dose of medication. His RICCARDO is elevated. I would like to refer him to rheumatology for evaluation.

## 2020-08-20 RX ORDER — LISINOPRIL AND HYDROCHLOROTHIAZIDE 25; 20 MG/1; MG/1
TABLET ORAL
Qty: 90 TABLET | Refills: 2 | Status: SHIPPED | OUTPATIENT
Start: 2020-08-20 | End: 2021-01-15 | Stop reason: SDUPTHER

## 2020-08-20 NOTE — TELEPHONE ENCOUNTER
Venkatesh Mc called requesting a refill of the below medication which has been pended for you:     Requested Prescriptions     Pending Prescriptions Disp Refills    lisinopril-hydroCHLOROthiazide (PRINZIDE;ZESTORETIC) 20-25 MG per tablet [Pharmacy Med Name: LISINOPRIL-HCTZ 20-25 MG TAB] 90 tablet 2     Sig: TAKE ONE TABLET BY MOUTH DAILY       Last Appointment Date: 06/29/2020  Next Appointment Date: Visit date not found    Allergies   Allergen Reactions    Gabapentin      GI side effects    Seasonal Other (See Comments)     RUNNY NOSE, WATERY EYES

## 2020-09-28 ENCOUNTER — HOSPITAL ENCOUNTER (OUTPATIENT)
Dept: INTERVENTIONAL RADIOLOGY/VASCULAR | Age: 60
Discharge: HOME | End: 2020-09-30
Payer: COMMERCIAL

## 2020-09-28 PROCEDURE — 76536 US EXAM OF HEAD AND NECK: CPT

## 2020-10-16 NOTE — TELEPHONE ENCOUNTER
Raul Lopez called requesting a refill of the below medication which has been pended for you:     Requested Prescriptions     Pending Prescriptions Disp Refills    metFORMIN (GLUCOPHAGE) 500 MG tablet [Pharmacy Med Name: metFORMIN  MG TABLET] 180 tablet 1     Si Connor Seay       Last Appointment Date: 2020  Next Appointment Date: Visit date not found    Allergies   Allergen Reactions    Gabapentin      GI side effects    Seasonal Other (See Comments)     RUNNY NOSE, WATERY EYES

## 2020-11-23 ENCOUNTER — PROCEDURE VISIT (OUTPATIENT)
Dept: PODIATRY | Age: 60
End: 2020-11-23
Payer: COMMERCIAL

## 2020-11-23 VITALS
DIASTOLIC BLOOD PRESSURE: 80 MMHG | WEIGHT: 293 LBS | HEIGHT: 70 IN | SYSTOLIC BLOOD PRESSURE: 128 MMHG | BODY MASS INDEX: 41.95 KG/M2 | HEART RATE: 74 BPM

## 2020-11-23 PROCEDURE — 11720 DEBRIDE NAIL 1-5: CPT | Performed by: PODIATRIST

## 2020-11-23 NOTE — PROGRESS NOTES
Subjective:  Bryce Tomlinson is a 64 y.o. male who presents to the office today for routine DM foot care. Pt had recent ingrown nail but that resolved. No other tx tried. Currently denies F/C/N/V. Allergies   Allergen Reactions    Gabapentin      GI side effects    Seasonal Other (See Comments)     RUNNY NOSE, WATERY EYES       Past Medical History:   Diagnosis Date    Cervical disc disease     Depression     Diabetes mellitus ([facility] [address]     PREDIABETIC    Erectile dysfunction     Hepatitis C     tx 6mon completed 8/913 Dr Bertha Mondragon TREATMENT    History of gastroesophageal reflux (GERD)     Hypertension     Hypotestosteronism     Impaired fasting glucose     Laceration 05/06/2017    TOP OF HEAD    Neck pain     Obesity     Sleep apnea     CPAP MACHINE    Substance abuse in remission ([address] [address]     IV DRUG USER 30 YEARS AGO    Substance abuse in remission ([address] [address]     ALCOHOLIC 20 YEARS    Testicular hypofunction 2015    Wears glasses        Prior to Admission medications    Medication Sig Start Date End Date Taking? Authorizing Provider   metFORMIN (GLUCOPHAGE) 500 MG tablet TAKE TWO TABLETS BY MOUTH DAILY WITH BREAKFAST 10/16/20  Yes SHAKIRA Lopez CNP   lisinopril-hydroCHLOROthiazide (PRINZIDE;ZESTORETIC) 20-25 MG per tablet TAKE ONE TABLET BY MOUTH DAILY 8/20/20  Yes SHAKIRA Lopez CNP   XARELTO 20 MG TABS tablet TAKE ONE TABLET BY MOUTH DAILY WITH BREAKFAST 7/20/20  Yes SHAKIRA Lopez CNP   sildenafil (VIAGRA) 50 MG tablet TAKE 1 TABLET BY MOUTH AS NEEDED FOR  ERECTILE DYSFUNCTION 12/19/19  Yes SHAKIRA Lopez CNP   therapeutic multivitamin-minerals (THERAGRAN-M) tablet Take 1 tablet by mouth daily.    Yes Historical Provider, MD   Testosterone (ANDROGEL) 20.25 MG/ACT (1.62%) GEL gel APPLY 4 PUMP ACTUATIONS  TOPICALLY ONCE DAILY 7/31/20 10/1/20  SHAKIRA Lopez CNP       Past Surgical History:   Procedure Laterality Date    CERVICAL FUSION  2017    C5-7    CERVICAL FUSION N/A 2017     ANTERIOR CERVICAL DECOMPRESSION FUSION C5-C7 performed by Carlos Miranda MD at [address] [address]  2014    polyps    COLONOSCOPY  2015    sigmoid diverticulosis    HAMMER TOE SURGERY Left     2ND  AND 3RD TOE DR ROLLING [facility] 12    LIVER BIOPSY      TYMPANIC MEMBRANE REPAIR      MULTIPLE AS A CHILD       Family History   Problem Relation Age of Onset    High Blood Pressure Mother     Diabetes Mother     Cataracts Mother     Cancer Father     Stroke Father     Other Brother         HEPATITIS C    Diabetes Sister     Diabetes Brother     Glaucoma Neg Hx        Social History     Tobacco Use    Smoking status: Former Smoker     Packs/day: 1.50     Years: 9.00     Pack years: 13.50     Types: Cigarettes     Last attempt to quit: 1992     Years since quittin.2    Smokeless tobacco: Former User     Quit date: 1990    Tobacco comment: [place] RRT 19   Substance Use Topics    Alcohol use: No     Alcohol/week: 0.0 standard drinks     Comment: HISTORY OF ALCOHOL ABUSE 30 YEARS AGO quit        Review of Systems: All 12 systems reviewed and pertinent positives noted above. Lower Extremity Physical Examination:     Vitals:   Vitals:    20 1617   BP: 128/80   Pulse: 74     General: AAO x 3 in NAD. Vascular: DP and PT pulses palpable 2/4, bilateral.  CFT <3 seconds, bilateral.  Hair growth present to the level of the digits, bilateral.  Edema present, bilateral.  Varicosities present, bilateral. Erythema absent, bilateral. Distal Rubor absent bilateral.  Temperature within normal limits bilateral. Hyperpigmentation present bilateral. Atrophic skin yes.     Neurological: Sensation intact to light touch to level of digits, bilateral.  Protective sensation intact 10/10 sites via 5.07/10g [place] Monofilament, bilateral.  negative Tinel's, bilateral.  negative Valleix sign, bilateral.  Vibratory intact bilateral.  Reflexes Decreased bilateral.  Paresthesias negative  Dysthesias negative. Sharp/dull intact bilateral.    Musculoskeletal: Muscle strength 5/5, bilateral.  Pain absent upon palpation of above mentioned hammerote. within normal limits medial longitudinal arch, Bilateral.  Ankle ROM decreased,Bilateral.  1st MPJ ROM within normal limits, Bilateral.  Dorsally contracted digits present digits , Bilateral.  negative Lachman's test.  Other foot deformities none. Integument: Warm, dry, supple, bilateral.  Open lesion absent, bilateral.  Interdigital maceration absent to web spaces bilateral.   Nails left 1  and right 1 thickened, dystrophic and crumbly, discolored with subungual debris. Fissures absent, bilateral. Hyperkeratotic tissue is absent. Asessment: Patient is a 64 y.o. male with:    Diagnosis Orders   1. Type 2 diabetes mellitus without complication, unspecified whether long term insulin use ([facility]     2. Dermatophytosis of nail     3. OC (onychocryptosis)         Plan: Patient examined and evaluated. Current condition and treatment options discussed in detail. DM foot ed and exam  All nails as mentioned above debrided in length and thickness. Patient advised OTC methods for treatment of the mycotic nails. Patient will follow up in 10 weeks for routine care if he chooses  Contact office with any questions/problems/concerns.   RTC in 1 year(s) for DM foot exam.

## 2021-01-06 DIAGNOSIS — I26.99 OTHER ACUTE PULMONARY EMBOLISM WITHOUT ACUTE COR PULMONALE (HCC): Primary | ICD-10-CM

## 2021-01-12 ENCOUNTER — OFFICE VISIT (OUTPATIENT)
Dept: ONCOLOGY | Age: 61
End: 2021-01-12
Payer: COMMERCIAL

## 2021-01-12 ENCOUNTER — HOSPITAL ENCOUNTER (OUTPATIENT)
Dept: LAB | Age: 61
Discharge: HOME | End: 2021-01-12
Payer: COMMERCIAL

## 2021-01-12 ENCOUNTER — HOSPITAL ENCOUNTER (OUTPATIENT)
Dept: INTERVENTIONAL RADIOLOGY/VASCULAR | Age: 61
Discharge: HOME | End: 2021-01-14
Payer: COMMERCIAL

## 2021-01-12 VITALS
DIASTOLIC BLOOD PRESSURE: 84 MMHG | HEART RATE: 83 BPM | BODY MASS INDEX: 40.29 KG/M2 | HEIGHT: 70 IN | OXYGEN SATURATION: 95 % | SYSTOLIC BLOOD PRESSURE: 132 MMHG | WEIGHT: 281.4 LBS

## 2021-01-12 DIAGNOSIS — I26.99 OTHER ACUTE PULMONARY EMBOLISM WITHOUT ACUTE COR PULMONALE (HCC): ICD-10-CM

## 2021-01-12 DIAGNOSIS — I82.411 DEEP VENOUS THROMBOSIS OF RIGHT PROFUNDA FEMORIS VEIN (HCC): ICD-10-CM

## 2021-01-12 DIAGNOSIS — E78.2 MIXED HYPERLIPIDEMIA: ICD-10-CM

## 2021-01-12 DIAGNOSIS — I26.99 OTHER ACUTE PULMONARY EMBOLISM WITHOUT ACUTE COR PULMONALE (HCC): Primary | ICD-10-CM

## 2021-01-12 DIAGNOSIS — Z79.01 CHRONIC ANTICOAGULATION: ICD-10-CM

## 2021-01-12 DIAGNOSIS — E34.9 HYPOTESTOSTERONISM: ICD-10-CM

## 2021-01-12 DIAGNOSIS — E66.01 MORBID OBESITY (HCC): ICD-10-CM

## 2021-01-12 LAB
ABSOLUTE EOS #: 0.13 K/UL (ref 0–0.44)
ABSOLUTE IMMATURE GRANULOCYTE: 0.03 K/UL (ref 0–0.3)
ABSOLUTE LYMPH #: 1.35 K/UL (ref 1.1–3.7)
ABSOLUTE MONO #: 0.52 K/UL (ref 0.1–1.2)
AST SERPL-CCNC: 42 U/L
BASOPHILS # BLD: 1 % (ref 0–2)
BASOPHILS ABSOLUTE: 0.04 K/UL (ref 0–0.2)
CHOLESTEROL/HDL RATIO: 6.5
CHOLESTEROL: 155 MG/DL
DIFFERENTIAL TYPE: ABNORMAL
EOSINOPHILS RELATIVE PERCENT: 3 % (ref 1–4)
HCT VFR BLD CALC: 48.5 % (ref 40.7–50.3)
HDLC SERPL-MCNC: 24 MG/DL
HEMOGLOBIN: 16.3 G/DL (ref 13–17)
IMMATURE GRANULOCYTES: 1 %
LDL CHOLESTEROL: 93 MG/DL (ref 0–130)
LYMPHOCYTES # BLD: 27 % (ref 24–43)
MCH RBC QN AUTO: 29.4 PG (ref 25.2–33.5)
MCHC RBC AUTO-ENTMCNC: 33.6 G/DL (ref 25.2–33.5)
MCV RBC AUTO: 87.4 FL (ref 82.6–102.9)
MONOCYTES # BLD: 10 % (ref 3–12)
NRBC AUTOMATED: 0 PER 100 WBC
PDW BLD-RTO: 13.4 % (ref 11.8–14.4)
PLATELET # BLD: 211 K/UL (ref 138–453)
PLATELET ESTIMATE: ABNORMAL
PMV BLD AUTO: 9.1 FL (ref 8.1–13.5)
RBC # BLD: 5.55 M/UL (ref 4.21–5.77)
RBC # BLD: ABNORMAL 10*6/UL
SEG NEUTROPHILS: 58 % (ref 36–65)
SEGMENTED NEUTROPHILS ABSOLUTE COUNT: 2.97 K/UL (ref 1.5–8.1)
TRIGL SERPL-MCNC: 188 MG/DL
VLDLC SERPL CALC-MCNC: ABNORMAL MG/DL (ref 1–30)
WBC # BLD: 5 K/UL (ref 3.5–11.3)
WBC # BLD: ABNORMAL 10*3/UL

## 2021-01-12 PROCEDURE — 36415 COLL VENOUS BLD VENIPUNCTURE: CPT

## 2021-01-12 PROCEDURE — 99214 OFFICE O/P EST MOD 30 MIN: CPT | Performed by: INTERNAL MEDICINE

## 2021-01-12 PROCEDURE — 93971 EXTREMITY STUDY: CPT

## 2021-01-12 PROCEDURE — 84450 TRANSFERASE (AST) (SGOT): CPT

## 2021-01-12 PROCEDURE — 80061 LIPID PANEL: CPT

## 2021-01-12 PROCEDURE — 85025 COMPLETE CBC W/AUTO DIFF WBC: CPT

## 2021-01-12 NOTE — PROGRESS NOTES
Bridgett Damian                                                                                                                  1/12/2021  MRN:   [id number]  YOB: 1960  PCP:                           SHAKIRA Phillips - CNP  Referring Physician: No ref. provider found  Treating Physician Name: Mindy Valentine MD      Reason for visit:  Chief Complaint   Patient presents with    6 Month Follow-Up     PULMONARY EMBOLISM        Current problems/ Active and recent treatments:  Right femoral, popliteal and gastrocnemius DVT  Segmental and subsegmental pulmonary embolism. Interval history:     Patient presents to the clinic for a follow-up visit and to discuss further treatment plan. Patient continues to be on Xarelto. He has been tolerating it without any severe adverse events. Denies any major bleeding episode. Continues to be on testosterone supplement. Patient BMI has increased to 41. Denies smoking. Does complain of some pain in his right leg on and off. During this visit patient's allergy, social, medical, surgical history and medications were reviewed and updated. Summary of Case/History:    Bridgett Damian a 64 y.o.male presents to the office to establish care and for further evaluation treatment recommendation regarding anticoagulation for patient's acute DVT and pulmonary embolism  Patient started noticing swelling of his right lower extremity back in July 2019. Work-up including ultrasound of lower extremity confirmed right lower extremity DVT involving femoral popliteal and gastrocnemius vein. Additional work-up including CT chest showed segmental and subsegmental very embolism. Patient was started on anticoagulation and discharged home on Xarelto. Patient recalls any surgery travel trauma before the pulmonary embolism and DVT. Patient does not smoke. He does not give any personal history of previous venous thrombi embolism.   Does not give any family history of venous thrombi embolism. Patient is on testosterone supplementation. His BMI is 39.4. Patient has been tolerating anticoagulation without any complications or severe side effects.       Past Medical History:   Past Medical History:   Diagnosis Date    Cervical disc disease     Depression     Diabetes mellitus ([facility] [address]     PREDIABETIC    Erectile dysfunction     Hepatitis C     tx 6mon completed  Dr Harriet Carcamo TREATMENT    History of gastroesophageal reflux (GERD)     Hypertension     Hypotestosteronism     Impaired fasting glucose     Laceration 2017    TOP OF HEAD    Neck pain     Obesity     Sleep apnea     CPAP MACHINE    Substance abuse in remission (HCC)     IV DRUG USER 30 YEARS AGO    Substance abuse in remission ([facility] [address]     ALCOHOLIC 20 YEARS    Testicular hypofunction     Wears glasses        Past Surgical History:     Past Surgical History:   Procedure Laterality Date    CERVICAL FUSION  2017    C5-7    CERVICAL FUSION N/A 2017     ANTERIOR CERVICAL DECOMPRESSION FUSION C5-C7 performed by Karthikeyan Norton MD at [address]  2014    polyps    COLONOSCOPY  2015    sigmoid diverticulosis    HAMMER TOE SURGERY Left     2ND  AND 3RD TOE  [facility] 12    LIVER BIOPSY      TYMPANIC MEMBRANE REPAIR      MULTIPLE AS A CHILD       Patient Family Social History:     Social History     Socioeconomic History    Marital status:      Spouse name: None    Number of children: None    Years of education: None    Highest education level: None   Occupational History    None   Social Needs    Financial resource strain: None    Food insecurity     Worry: None     Inability: None    Transportation needs     Medical: None     Non-medical: None   Tobacco Use    Smoking status: Former Smoker     Packs/day: 1.50     Years: 9.00     Pack years: 13.50     Types: Cigarettes     Quit date: 1992     Years since quittin.4    Smokeless tobacco: Former User     Quit date: 8/14/1990    Tobacco comment: Komal Lora RRT 7/1/19   Substance and Sexual Activity    Alcohol use: No     Alcohol/week: 0.0 standard drinks     Comment: HISTORY OF ALCOHOL ABUSE 30 YEARS AGO quit 1989    Drug use: No     Types: IV     Comment: HISTORY OF IV DRUG USE 30 YEARS AGO    Sexual activity: None   Lifestyle    Physical activity     Days per week: None     Minutes per session: None    Stress: None   Relationships    Social connections     Talks on phone: None     Gets together: None     Attends Buddhist service: None     Active member of club or organization: None     Attends meetings of clubs or organizations: None     Relationship status: None    Intimate partner violence     Fear of current or ex partner: None     Emotionally abused: None     Physically abused: None     Forced sexual activity: None   Other Topics Concern    None   Social History Narrative    None     Family History   Problem Relation Age of Onset    High Blood Pressure Mother     Diabetes Mother     Cataracts Mother     Cancer Father     Stroke Father     Other Brother         HEPATITIS C    Diabetes Sister     Diabetes Brother     Glaucoma Neg Hx        Current Medications:     Current Outpatient Medications   Medication Sig Dispense Refill    rivaroxaban (XARELTO) 20 MG TABS tablet Take 1 tablet by mouth daily (with breakfast) 30 tablet 5    metFORMIN (GLUCOPHAGE) 500 MG tablet TAKE TWO TABLETS BY MOUTH DAILY WITH BREAKFAST 180 tablet 1    lisinopril-hydroCHLOROthiazide (PRINZIDE;ZESTORETIC) 20-25 MG per tablet TAKE ONE TABLET BY MOUTH DAILY 90 tablet 2    Testosterone (ANDROGEL) 20.25 MG/ACT (1.62%) GEL gel APPLY 4 PUMP ACTUATIONS  TOPICALLY ONCE DAILY 120 actuation 2    XARELTO 20 MG TABS tablet TAKE ONE TABLET BY MOUTH DAILY WITH BREAKFAST 90 tablet 1    sildenafil (VIAGRA) 50 MG tablet TAKE 1 TABLET BY MOUTH AS NEEDED FOR  ERECTILE DYSFUNCTION 30 tablet 1    therapeutic multivitamin-minerals (THERAGRAN-M) tablet Take 1 tablet by mouth daily. No current facility-administered medications for this visit. Allergies:   Gabapentin and Seasonal    Review of Systems:    Constitutional: No fever or chills. No night sweats, no weight loss   Eyes: No eye discharge, double vision, or eye pain   HEENT: negative for sore mouth, sore throat, hoarseness and voice change   Respiratory: negative for cough , sputum, dyspnea, wheezing, hemoptysis, chest pain   Cardiovascular: negative for chest pain, dyspnea, palpitations, orthopnea, PND   Gastrointestinal: negative for nausea, vomiting, diarrhea, constipation, abdominal pain, Dysphagia, hematemesis and hematochezia   Genitourinary: negative for frequency, dysuria, nocturia, urinary incontinence, and hematuria   Integument: negative for rash, skin lesions, bruises.    Hematologic/Lymphatic: negative for easy bruising, bleeding, lymphadenopathy, or petechiae   Endocrine: negative for heat or cold intolerance,weight changes, change in bowel habits and hair loss   Musculoskeletal: negative for myalgias, arthralgias, pain, joint swelling,and bone pain   Neurological: negative for headaches, dizziness, seizures, weakness, numbness        Physical Exam:    Vitals: /84 (Site: Left Upper Arm, Position: Sitting, Cuff Size: Medium Adult)   Pulse 83   Ht 5' 10\" (1.778 m)   Wt 281 lb 6.4 oz (127.6 kg)   SpO2 95%   BMI 40.38 kg/m²   General appearance - well appearing, no in pain or distress  Mental status - AAO X3  Eyes - pupils equal and reactive, extraocular eye movements intact  Mouth - mucous membranes moist, pharynx normal without lesions  Neck - supple, no significant adenopathy  Lymphatics - no palpable lymphadenopathy, no hepatosplenomegaly  Chest - clear to auscultation, no wheezes, rales or rhonchi, symmetric air entry  Heart - normal rate, regular rhythm, normal S1, S2, no murmurs  Abdomen - soft, nontender, nondistended, no masses or organomegaly  Neurological - alert, oriented, normal speech, no focal findings or movement disorder noted  Extremities - peripheral pulses normal, no pedal edema, no clubbing or cyanosis  Skin - normal coloration and turgor, no rashes, no suspicious skin lesions noted       DATA:    Results for orders placed or performed during the hospital encounter of 01/12/21   AST   Result Value Ref Range    AST 42 (H) <40 U/L     CT chest 7/2019      Impression   Intraluminal pulmonary arterial filling defects within segmental and   subsegmental branches within the right upper lobe.  Subsegmental filling   defect within the lower lobes bilaterally.  Findings compatible with   multifocal pulmonary emboli.  No evidence for pulmonary infarction or right   heart strain.       Findings were discussed with Niraj Calabrese at 10:31 am on 7/1/2019.             Vl Dup Lower Extremity Venous Right    Result Date: 1/12/2021  EXAMINATION: DUPLEX VENOUS ULTRASOUND OF THE RIGHT LOWER EXTREMITY, 1/12/2021 7:20 am TECHNIQUE: Duplex ultrasound and Doppler images were obtained of the right lower extremity. COMPARISON: 07/01/2019 HISTORY: ORDERING SYSTEM PROVIDED HISTORY: Deep venous thrombosis of right profunda femoris vein (HCC) FINDINGS: The visualized veins of the right lower extremity are without evidence of acute thrombus. Residual thrombus is noted within the distal femoral, popliteal and posterior tibial veins with evidence of recanalized flow. No evidence of acute DVT in the right lower extremity with chronic/residual thrombosis as described. Impression:  Right lower extremity DVT involving femoral popliteal and gastrocnemius vein. Right segmental and subsegmental pulmonary embolism. Testosterone supplementation  BMI of 41.1  Hypertension  Depression    Plan:  Personally reviewed results of lab work-up and other relevant clinical data.     Patient thrombophilia work-up including factor V Leyden and prothrombin gene mutation were negative. Patient also saw his rheumatologist had some work-up done including antiphospholipid antibodies according the patient they are positive. We will obtain lab work-up from patient rheumatologist.    Patient continues to have risk factors for venous thromboembolism including testosterone elevated BMI. Patient's other risk factor includes advancing age and BMI of 41.1. I believe patient's risk factors include obesity testosterone and advancing age. Patient is not willing to go off the testosterone supplements. I discussed risk and benefit of discontinuation of anticoagulation. Patient places more importance on avoiding any more episodes of venous thromboembolism and prefers to stay on anticoagulation as well as a testosterone supplements. Ultrasound leg shows resolution of the DVT. Based on the above discussion we decided to continue anticoagulation. We will continue to monitor patient and reassess risk and benefit. We will see patient back in office 6 months. Patient had multiple questions which answered to the best of my ability. Matthieu Larsen        This note is created with the assistance of a speech recognition program.  While intending to generate a document that actually reflects the content of the visit, the document can still have some errors including those of syntax and sound a like substitutions which may escape proof reading. It such instances, actual meaning can be extrapolated by contextual diversion.

## 2021-01-13 ENCOUNTER — TELEPHONE (OUTPATIENT)
Dept: FAMILY MEDICINE CLINIC | Age: 61
End: 2021-01-13

## 2021-01-13 DIAGNOSIS — R73.01 IMPAIRED FASTING GLUCOSE: ICD-10-CM

## 2021-01-13 DIAGNOSIS — I26.99 OTHER ACUTE PULMONARY EMBOLISM WITHOUT ACUTE COR PULMONALE (HCC): ICD-10-CM

## 2021-01-13 DIAGNOSIS — Z79.01 CHRONIC ANTICOAGULATION: ICD-10-CM

## 2021-01-13 DIAGNOSIS — E34.9 HYPOTESTOSTERONISM: ICD-10-CM

## 2021-01-13 DIAGNOSIS — I82.411 DEEP VENOUS THROMBOSIS OF RIGHT PROFUNDA FEMORIS VEIN (HCC): ICD-10-CM

## 2021-01-13 DIAGNOSIS — E66.01 MORBID OBESITY (HCC): ICD-10-CM

## 2021-01-13 DIAGNOSIS — I10 ESSENTIAL HYPERTENSION: ICD-10-CM

## 2021-01-15 RX ORDER — TESTOSTERONE 16.2 MG/G
GEL TRANSDERMAL
Qty: 120 ACTUATION | Refills: 2 | Status: SHIPPED | OUTPATIENT
Start: 2021-01-15 | End: 2021-05-11 | Stop reason: SDUPTHER

## 2021-01-15 RX ORDER — LISINOPRIL AND HYDROCHLOROTHIAZIDE 25; 20 MG/1; MG/1
TABLET ORAL
Qty: 90 TABLET | Refills: 1 | Status: SHIPPED | OUTPATIENT
Start: 2021-01-15 | End: 2021-08-23

## 2021-01-15 NOTE — TELEPHONE ENCOUNTER
Patient is in agreement to start Lipitor.  Patient states that he has had his wife's insurance and [employer] has messed up his medication so he needs refills pended

## 2021-01-15 NOTE — TELEPHONE ENCOUNTER
Medication refilled. Pt needs a medication contract at his next appt. Controlled Substance Monitoring:    Acute and Chronic Pain Monitoring:   RX Monitoring 1/15/2021   Attestation -   Periodic Controlled Substance Monitoring No signs of potential drug abuse or diversion identified.;Obtaining appropriate analgesic effect of treatment.

## 2021-01-26 DIAGNOSIS — N52.9 ERECTILE DYSFUNCTION, UNSPECIFIED ERECTILE DYSFUNCTION TYPE: ICD-10-CM

## 2021-01-26 NOTE — TELEPHONE ENCOUNTER
Requested Prescriptions     Pending Prescriptions Disp Refills    sildenafil (VIAGRA) 50 MG tablet 30 tablet 1     Sig: TAKE 1 TABLET BY MOUTH AS NEEDED FOR  ERECTILE DYSFUNCTION     Last Appt:  6/29/2020  Next Appt:   Visit date not found  Med verified in Epic

## 2021-01-27 RX ORDER — SILDENAFIL 50 MG/1
TABLET, FILM COATED ORAL
Qty: 30 TABLET | Refills: 1 | Status: SHIPPED | OUTPATIENT
Start: 2021-01-27 | End: 2022-09-16 | Stop reason: ALTCHOICE

## 2021-04-13 ENCOUNTER — OFFICE VISIT (OUTPATIENT)
Dept: OPTOMETRY | Age: 61
End: 2021-04-13
Payer: COMMERCIAL

## 2021-04-13 DIAGNOSIS — H52.4 MYOPIA OF BOTH EYES WITH ASTIGMATISM AND PRESBYOPIA: ICD-10-CM

## 2021-04-13 DIAGNOSIS — E11.9 DIABETES MELLITUS TYPE 2, NONINSULIN DEPENDENT (HCC): Primary | ICD-10-CM

## 2021-04-13 DIAGNOSIS — H52.203 MYOPIA OF BOTH EYES WITH ASTIGMATISM AND PRESBYOPIA: ICD-10-CM

## 2021-04-13 DIAGNOSIS — H52.13 MYOPIA OF BOTH EYES WITH ASTIGMATISM AND PRESBYOPIA: ICD-10-CM

## 2021-04-13 DIAGNOSIS — Z79.899 HIGH RISK MEDICATION USE: ICD-10-CM

## 2021-04-13 DIAGNOSIS — H53.8 BLURRED VISION, BILATERAL: ICD-10-CM

## 2021-04-13 PROCEDURE — 92250 FUNDUS PHOTOGRAPHY W/I&R: CPT | Performed by: OPTOMETRIST

## 2021-04-13 PROCEDURE — 99214 OFFICE O/P EST MOD 30 MIN: CPT | Performed by: OPTOMETRIST

## 2021-04-13 RX ORDER — TROPICAMIDE 10 MG/ML
1 SOLUTION/ DROPS OPHTHALMIC ONCE
Status: COMPLETED | OUTPATIENT
Start: 2021-04-13 | End: 2021-04-13

## 2021-04-13 RX ORDER — HYDROXYCHLOROQUINE SULFATE 200 MG/1
TABLET, FILM COATED ORAL
COMMUNITY
Start: 2021-04-06

## 2021-04-13 RX ADMIN — TROPICAMIDE 1 DROP: 10 SOLUTION/ DROPS OPHTHALMIC at 15:02

## 2021-04-13 ASSESSMENT — REFRACTION_MANIFEST
OS_ADD: +2.25
OD_AXIS: 103
OS_CYLINDER: -2.00
OD_SPHERE: -0.75
OS_SPHERE: -0.75
OS_AXIS: 062
OD_CYLINDER: -1.75
OD_ADD: +2.25

## 2021-04-13 ASSESSMENT — REFRACTION_WEARINGRX
OS_SPHERE: -1.00
OS_ADD: +2.25
OD_SPHERE: -0.75
OD_AXIS: 099
OD_CYLINDER: -2.00
OS_AXIS: 062
SPECS_TYPE: PAL
OS_CYLINDER: -2.00
OD_ADD: +2.25

## 2021-04-13 ASSESSMENT — SLIT LAMP EXAM - LIDS
COMMENTS: NORMAL
COMMENTS: NORMAL

## 2021-04-13 ASSESSMENT — ENCOUNTER SYMPTOMS
RESPIRATORY NEGATIVE: 0
EYES NEGATIVE: 0
ALLERGIC/IMMUNOLOGIC NEGATIVE: 0
GASTROINTESTINAL NEGATIVE: 0

## 2021-04-13 ASSESSMENT — KERATOMETRY
OS_AXISANGLE2_DEGREES: 071
OS_K2POWER_DIOPTERS: 42.25
OS_AXISANGLE_DEGREES: 161
OS_K1POWER_DIOPTERS: 41.25
METHOD_AUTO_MANUAL: AUTOMATED

## 2021-04-13 ASSESSMENT — VISUAL ACUITY
CORRECTION_TYPE: GLASSES
OD_CC: 20/25 OU
METHOD: SNELLEN - LINEAR
OS_CC: 20/25

## 2021-04-13 ASSESSMENT — TONOMETRY
IOP_METHOD: NON-CONTACT AIR PUFF
OS_IOP_MMHG: 19
OD_IOP_MMHG: 18

## 2021-04-13 NOTE — PROGRESS NOTES
Cleo Smith presents today for   Chief Complaint   Patient presents with    Blurred Vision    Ophth Diabetic Exam   .    HPI     Blurred Vision     In both eyes. Vision is blurred. Context:  distance vision and reading. Comments     Last Vision Exam: 9/18/19  Last Ophthalmology Exam: n/a   Last Filled Glasses Rx: 9/18/19  Insurance: [employer]  Update: DM Exam, update glasses   Distance and reading are getting a little more blurry  Has Lundberg syndromes ; started plaquenil in [facility]  Diabetic:  Sugars:    HmgA1C: 6.6 June 2020                   Current Outpatient Medications   Medication Sig Dispense Refill    hydroxychloroquine (PLAQUENIL) 200 MG tablet TAKE 2 TABLETS BY MOUTH ONCE DAILY      sildenafil (VIAGRA) 50 MG tablet TAKE 1 TABLET BY MOUTH AS NEEDED FOR  ERECTILE DYSFUNCTION 30 tablet 1    Testosterone (ANDROGEL) 20.25 MG/ACT (1.62%) GEL gel APPLY 4 PUMP ACTUATIONS  TOPICALLY ONCE DAILY 120 actuation 2    rivaroxaban (XARELTO) 20 MG TABS tablet Take 1 tablet by mouth daily (with breakfast) 90 tablet 1    metFORMIN (GLUCOPHAGE) 500 MG tablet TAKE TWO TABLETS BY MOUTH DAILY WITH BREAKFAST 180 tablet 1    lisinopril-hydroCHLOROthiazide (PRINZIDE;ZESTORETIC) 20-25 MG per tablet TAKE ONE TABLET BY MOUTH DAILY 90 tablet 1    XARELTO 20 MG TABS tablet TAKE ONE TABLET BY MOUTH DAILY WITH BREAKFAST 90 tablet 1    therapeutic multivitamin-minerals (THERAGRAN-M) tablet Take 1 tablet by mouth daily.        Current Facility-Administered Medications   Medication Dose Route Frequency Provider Last Rate Last Admin    tropicamide (MYDRIACYL) 1 % ophthalmic solution 1 drop  1 drop Both Eyes Once Miryam Lambert, OD         ROS     Negative for: Constitutional, Gastrointestinal, Neurological, Skin, Genitourinary, Musculoskeletal, HENT, Endocrine, Cardiovascular, Eyes, Respiratory, Psychiatric, Allergic/Imm, Heme/Lymph          Family History   Problem Relation Age of Onset    High Blood Pressure Mother  Diabetes Mother     Cataracts Mother     Cancer Father     Stroke Father     Other Brother         HEPATITIS C    Diabetes Sister     Diabetes Brother     Glaucoma Neg Hx      Social History     Socioeconomic History    Marital status:      Spouse name: None    Number of children: None    Years of education: None    Highest education level: None   Occupational History    None   Social Needs    Financial resource strain: None    Food insecurity     Worry: None     Inability: None    Transportation needs     Medical: None     Non-medical: None   Tobacco Use    Smoking status: Former Smoker     Packs/day: 1.50     Years: 9.00     Pack years: 13.50     Types: Cigarettes     Quit date: 1992     Years since quittin.6    Smokeless tobacco: Former User     Quit date: 1990    Tobacco comment: Maribell Vega RRT 19   Substance and Sexual Activity    Alcohol use: No     Alcohol/week: 0.0 standard drinks     Comment: HISTORY OF ALCOHOL ABUSE 30 YEARS AGO quit 1989    Drug use: No     Types: IV     Comment: HISTORY OF IV DRUG USE 30 YEARS AGO    Sexual activity: None   Lifestyle    Physical activity     Days per week: None     Minutes per session: None    Stress: None   Relationships    Social connections     Talks on phone: None     Gets together: None     Attends Hinduism service: None     Active member of club or organization: None     Attends meetings of clubs or organizations: None     Relationship status: None    Intimate partner violence     Fear of current or ex partner: None     Emotionally abused: None     Physically abused: None     Forced sexual activity: None   Other Topics Concern    None   Social History Narrative    None       Past Medical History:   Diagnosis Date    Cervical disc disease     Depression     Diabetes mellitus ([address]     PREDIABETIC    Erectile dysfunction     Hepatitis C     tx 6mon completed  Dr Sisi Carson WITH TREATMENT    History of gastroesophageal reflux (GERD)     Hypertension     Hypotestosteronism     Impaired fasting glucose     Laceration 05/06/2017    TOP OF HEAD    Neck pain     Obesity     Sleep apnea     CPAP MACHINE    Substance abuse in remission ([facility] [address]     IV DRUG USER 30 YEARS AGO    Substance abuse in remission ([facility] [address]     ALCOHOLIC 20 YEARS    Testicular hypofunction 2015    Wears glasses          Main Ophthalmology Exam     External Exam       Right Left    External Normal Normal          Slit Lamp Exam       Right Left    Lids/Lashes Normal Normal    Conjunctiva/Sclera White and quiet White and quiet    Cornea Clear Clear    Anterior Chamber Deep and quiet Deep and quiet    Iris Round and reactive Round and reactive    Lens Trace Nuclear sclerosis Trace Nuclear sclerosis    Vitreous Normal Normal          Fundus Exam       Right Left    Disc Normal Normal    C/D Ratio 0.25 0.25    Macula Normal; one drusen like nasal to the macula  Normal    Vessels Normal Normal    Periphery Normal Normal                  Tonometry     Tonometry (Non-contact air puff, 1:34 PM)       Right Left    Pressure 18 19      Right / Left  IOPg 16.7/17.5  CH 9.1/9.6  WS 8.8/7.6                     Visual Acuity (Snellen - Linear)       Right Left    Dist cc 20/20 20/25    Near cc 20/25 OU     Correction: Glasses        Keratometry     Keratometry (Automated)       K1 Axis K2 Axis    Right        Left 41.25 071 42.25 161              Pupils     Pupils       Pupils    Right PERRL    Left PERRL               Not recorded         Not recorded          Ophthalmology Exam     Wearing Rx       Sphere Cylinder Axis Add    Right -0.75 -2.00 099 +2.25    Left -1.00 -2.00 062 +2.25    Age: 5yrs    Type: PAL                Manifest Refraction     Manifest Refraction       Sphere Cylinder Axis Dist VA Add    Right -0.75 -1.75 103 20/20 +2.25    Left -0.75 -2.00 062 20/20 +2.25          Manifest Refraction #2 (Auto)       Sphere Cylinder [place] Dist VA Add

## 2021-04-28 ENCOUNTER — OFFICE VISIT (OUTPATIENT)
Dept: FAMILY MEDICINE CLINIC | Age: 61
End: 2021-04-28
Payer: COMMERCIAL

## 2021-04-28 VITALS
WEIGHT: 283.6 LBS | HEIGHT: 70 IN | TEMPERATURE: 97.9 F | OXYGEN SATURATION: 97 % | HEART RATE: 81 BPM | SYSTOLIC BLOOD PRESSURE: 140 MMHG | DIASTOLIC BLOOD PRESSURE: 80 MMHG | BODY MASS INDEX: 40.6 KG/M2

## 2021-04-28 DIAGNOSIS — L82.1 SEBORRHEIC KERATOSIS: Primary | ICD-10-CM

## 2021-04-28 DIAGNOSIS — L91.8 SKIN TAG: ICD-10-CM

## 2021-04-28 PROCEDURE — 99213 OFFICE O/P EST LOW 20 MIN: CPT | Performed by: FAMILY MEDICINE

## 2021-04-28 NOTE — PROGRESS NOTES
facility-administered medications for this visit. Allergies   Allergen Reactions    Gabapentin      GI side effects    Seasonal Other (See Comments)     RUNNY NOSE, WATERY EYES       PHYSICAL EXAM:  VS:  BP (!) 140/80   Pulse 81   Temp 97.9 °F (36.6 °C)   Ht 5' 10\" (1.778 m)   Wt 283 lb 9.6 oz (128.6 kg)   SpO2 97%   BMI 40.69 kg/m²   General:  Alert and oriented, NAD  HEENT:  TMs, ROSA, EOMI, Conjunctivae clear,Throat currently clear. NECK:  Supple without adenopathy or thyromegaly, no carotid bruits  LUNGS:  CTA all fields  HEART:  RRR without Murmur  ABDOMEN:  Soft and nontender without palpable abnormalities  EXTREMITIES:  No edema, no calf tenderness  SKIN:Has a irregular raised small darkish brown lesion in the left [place] area less the 0.5 cms. Multiple small skin  Tags in axilla left side  ASSESSMENT/PLAN:     Diagnosis Orders   1. Seborrheic keratosis     2. Skin tag         No orders of the defined types were placed in this encounter. Requested Prescriptions      No prescriptions requested or ordered in this encounter   Patient was reassured. Advised excision of lesion and skin tags if patient wants at his convenience  F/U with PCP for ongoing care    Return if symptoms worsen or fail to improve.     Electronically signed by Ayesha Thompson MD

## 2021-05-09 DIAGNOSIS — E34.9 HYPOTESTOSTERONISM: ICD-10-CM

## 2021-05-10 RX ORDER — TESTOSTERONE 16.2 MG/G
GEL TRANSDERMAL
Qty: 150 G | Refills: 0 | OUTPATIENT
Start: 2021-05-10

## 2021-05-10 NOTE — TELEPHONE ENCOUNTER
Per [facility] last refill 04/19 #150  Lucia Mendoza called requesting a refill of the below medication which has been pended for you:     Requested Prescriptions     Pending Prescriptions Disp Refills    Testosterone (ANDROGEL) 20.25 MG/ACT (1.62%) GEL gel [Pharmacy Med Name: Testosterone 20.25 MG/ACT (1.62%) Transdermal Gel] 150 g 0     Sig: APPLY 4 PUMPS TOPICALLY ONCE DAILY       Last Appointment Date: 6/29/2020  Next Appointment Date: Visit date not found    Allergies   Allergen Reactions    Gabapentin      GI side effects    Seasonal Other (See Comments)     RUNNY NOSE, WATERY EYES

## 2021-05-11 ENCOUNTER — VIRTUAL VISIT (OUTPATIENT)
Dept: FAMILY MEDICINE CLINIC | Age: 61
End: 2021-05-11
Payer: COMMERCIAL

## 2021-05-11 DIAGNOSIS — E78.2 MIXED HYPERLIPIDEMIA: ICD-10-CM

## 2021-05-11 DIAGNOSIS — E34.9 HYPOTESTOSTERONISM: Primary | ICD-10-CM

## 2021-05-11 DIAGNOSIS — R73.01 IMPAIRED FASTING GLUCOSE: ICD-10-CM

## 2021-05-11 PROCEDURE — 99214 OFFICE O/P EST MOD 30 MIN: CPT | Performed by: NURSE PRACTITIONER

## 2021-05-11 RX ORDER — TADALAFIL 5 MG/1
5 TABLET ORAL DAILY PRN
Qty: 90 TABLET | Refills: 1 | Status: SHIPPED | OUTPATIENT
Start: 2021-05-11 | End: 2022-03-28

## 2021-05-11 RX ORDER — TESTOSTERONE 16.2 MG/G
GEL TRANSDERMAL
Qty: 120 ACTUATION | Refills: 5 | Status: SHIPPED | OUTPATIENT
Start: 2021-05-11 | End: 2021-11-19

## 2021-05-11 ASSESSMENT — PATIENT HEALTH QUESTIONNAIRE - PHQ9
1. LITTLE INTEREST OR PLEASURE IN DOING THINGS: 0
SUM OF ALL RESPONSES TO PHQ QUESTIONS 1-9: 0

## 2021-05-11 NOTE — PROGRESS NOTES
2021    TELEHEALTH EVALUATION -- Audio/Visual (During TOJTC-17 public health emergency)    HPI:    Jessica Murdock (:  1960) has requested an audio/video evaluation for the following concern(s):    Pt presents to the clinic for a virtual visit. He has a history of hypogonadism. He is on current testosterone replacement. He is due for labs. He has a history of ED associated with this and is currently using viagra but would like to see if cialis would be covered by his insurance. Pt states that the viagra is working for him. He has uses cialis in the past which worked better [employer] would not cover it at the time. He has a history of IFG. He is currently taking metformin. He does not check his blood sugar. He is due for labs. He has a history of hyperlipidemia. He does not take anything for his cholesterol. He has not been exercising. He is obese. He has no further concerns.     Past Medical History:   Diagnosis Date    Cervical disc disease     Depression     Diabetes mellitus ([address]     PREDIABETIC    Erectile dysfunction     Hepatitis C     tx 6mon completed  Dr Merle Robles TREATMENT    History of gastroesophageal reflux (GERD)     Hypertension     Hypotestosteronism     Impaired fasting glucose     Laceration 2017    TOP OF HEAD    Neck pain     Obesity     Sleep apnea     CPAP MACHINE    Substance abuse in remission ([address]     IV DRUG USER 30 YEARS AGO    Substance abuse in remission ([address]     ALCOHOLIC 20 YEARS    Testicular hypofunction     Wears glasses        Past Surgical History:   Procedure Laterality Date    CERVICAL FUSION  2017    C5-7    CERVICAL FUSION N/A 2017     ANTERIOR CERVICAL DECOMPRESSION FUSION C5-C7 performed by Blaine Singh MD at [address] [facility]  2014    polyps    COLONOSCOPY  2015    sigmoid diverticulosis    HAMMER TOE SURGERY Left     2ND  AND 3RD TOE DR ROLLING [facility]    LIVER BIOPSY      TYMPANIC MEMBRANE REPAIR      MULTIPLE AS A CHILD       Social History     Socioeconomic History    Marital status:      Spouse name: Not on file    Number of children: Not on file    Years of education: Not on file    Highest education level: Not on file   Occupational History    Not on file   Social Needs    Financial resource strain: Not on file    Food insecurity     Worry: Not on file     Inability: Not on file    Transportation needs     Medical: Not on file     Non-medical: Not on file   Tobacco Use    Smoking status: Former Smoker     Packs/day: 1.50     Years: 9.00     Pack years: 13.50     Types: Cigarettes     Quit date: 1992     Years since quittin.7    Smokeless tobacco: Former User     Quit date: 1990    Tobacco comment: Sheila Mathis RRT 19   Substance and Sexual Activity    Alcohol use: No     Alcohol/week: 0.0 standard drinks     Comment: HISTORY OF ALCOHOL ABUSE 30 YEARS AGO quit 1989    Drug use: No     Types: IV     Comment: HISTORY OF IV DRUG USE 30 YEARS AGO    Sexual activity: Not on file   Lifestyle    Physical activity     Days per week: Not on file     Minutes per session: Not on file    Stress: Not on file   Relationships    Social connections     Talks on phone: Not on file     Gets together: Not on file     Attends Moravian service: Not on file     Active member of club or organization: Not on file     Attends meetings of clubs or organizations: Not on file     Relationship status: Not on file    Intimate partner violence     Fear of current or ex partner: Not on file     Emotionally abused: Not on file     Physically abused: Not on file     Forced sexual activity: Not on file   Other Topics Concern    Not on file   Social History Narrative    Not on file       Family History   Problem Relation Age of Onset    High Blood Pressure Mother     Diabetes Mother     Cataracts Mother     Cancer Father     Stroke Father     Other Brother HEPATITIS C    Diabetes Sister     Diabetes Brother     Glaucoma Neg Hx        Allergies   Allergen Reactions    Gabapentin      GI side effects    Seasonal Other (See Comments)     RUNNY NOSE, WATERY EYES       Current Outpatient Medications   Medication Sig Dispense Refill    Testosterone (ANDROGEL) 20.25 MG/ACT (1.62%) GEL gel APPLY 4 PUMP ACTUATIONS  TOPICALLY ONCE DAILY 120 actuation 5    tadalafil (CIALIS) 5 MG tablet Take 1 tablet by mouth daily as needed for Erectile Dysfunction 90 tablet 1    hydroxychloroquine (PLAQUENIL) 200 MG tablet TAKE 2 TABLETS BY MOUTH ONCE DAILY      sildenafil (VIAGRA) 50 MG tablet TAKE 1 TABLET BY MOUTH AS NEEDED FOR  ERECTILE DYSFUNCTION 30 tablet 1    rivaroxaban (XARELTO) 20 MG TABS tablet Take 1 tablet by mouth daily (with breakfast) 90 tablet 1    metFORMIN (GLUCOPHAGE) 500 MG tablet TAKE TWO TABLETS BY MOUTH DAILY WITH BREAKFAST 180 tablet 1    lisinopril-hydroCHLOROthiazide (PRINZIDE;ZESTORETIC) 20-25 MG per tablet TAKE ONE TABLET BY MOUTH DAILY 90 tablet 1    therapeutic multivitamin-minerals (THERAGRAN-M) tablet Take 1 tablet by mouth daily. No current facility-administered medications for this visit. Review of Systems   Constitutional: Negative for activity change, appetite change, fatigue and fever. Respiratory: Negative for cough, shortness of breath and wheezing. Cardiovascular: Negative for chest pain and palpitations. Gastrointestinal: Negative for diarrhea, nausea and vomiting. Genitourinary: Negative for dysuria, frequency and urgency. Skin: Negative. Neurological: Negative for dizziness, light-headedness and headaches. Prior to Visit Medications    Medication Sig Taking?  Authorizing Provider   hydroxychloroquine (PLAQUENIL) 200 MG tablet TAKE 2 TABLETS BY MOUTH ONCE DAILY  Historical Provider, MD   sildenafil (VIAGRA) 50 MG tablet TAKE 1 TABLET BY MOUTH AS NEEDED FOR  ERECTILE DYSFUNCTION  Mary Reynoso Abnormal-     Neck: [x] No visualized mass     Pulmonary/Chest: [x] Respiratory effort normal.  [x] No visualized signs of difficulty breathing or respiratory distress        [] Abnormal-      Musculoskeletal:   [x] Normal gait with no signs of ataxia         [x] Normal range of motion of neck        [] Abnormal-       Neurological:        [x] No Facial Asymmetry (Cranial nerve 7 motor function) (limited exam to video visit)          [x] No gaze palsy        [] Abnormal-         Skin:        [x] No significant exanthematous lesions or discoloration noted on facial skin         [] Abnormal-            Psychiatric:       [x] Normal Affect [x] No Hallucinations        [] Abnormal-         ASSESSMENT/PLAN:  1. Hypotestosteronism  Due for labs  Refills provided  - Testosterone (ANDROGEL) 20.25 MG/ACT (1.62%) GEL gel; APPLY 4 PUMP ACTUATIONS  TOPICALLY ONCE DAILY  Dispense: 120 actuation; Refill: 5  - Testosterone, Free; Future    2. Impaired fasting glucose  Due for labs  - Microalbumin, Ur; Future  - Comprehensive Metabolic Panel; Future  - Hemoglobin A1C; Future    3. Mixed hyperlipidemia  Due for labs  - Lipid Panel; Future    Will call with results   Further treatment will be based on results  Pt to return in 6 months for follow up  Pt to return PRN   No follow-ups on file. David Cabrera, was evaluated through a synchronous (real-time) audio-video encounter. The patient (or guardian if applicable) is aware that this is a billable service. Verbal consent to proceed has been obtained within the past 12 months. The visit was conducted pursuant to the emergency declaration under the [address] [place] [address] authority and the [employer] General Act. Patient identification was verified, and a caregiver was present when appropriate. The patient was located in a state where the provider was credentialed to provide care.     Total time spent on this encounter: Not billed by time    --SHAKIRA Arellano - CNP on 5/11/2021 at 1:47 PM    An electronic signature was used to authenticate this note.

## 2021-05-13 ENCOUNTER — PROCEDURE VISIT (OUTPATIENT)
Dept: OPTOMETRY | Age: 61
End: 2021-05-13
Payer: COMMERCIAL

## 2021-05-13 DIAGNOSIS — Z79.899 HIGH RISK MEDICATION USE: Primary | ICD-10-CM

## 2021-05-13 PROCEDURE — 92083 EXTENDED VISUAL FIELD XM: CPT | Performed by: OPTOMETRIST

## 2021-05-13 PROCEDURE — 92134 CPTRZ OPH DX IMG PST SGM RTA: CPT | Performed by: OPTOMETRIST

## 2021-05-13 ASSESSMENT — ENCOUNTER SYMPTOMS
SHORTNESS OF BREATH: 0
VOMITING: 0
RESPIRATORY NEGATIVE: 0
NAUSEA: 0
DIARRHEA: 0
GASTROINTESTINAL NEGATIVE: 0
WHEEZING: 0
COUGH: 0
ALLERGIC/IMMUNOLOGIC NEGATIVE: 0
EYES NEGATIVE: 0

## 2021-05-13 ASSESSMENT — REFRACTION_WEARINGRX
OS_SPHERE: -0.75
SPECS_TYPE: PAL
OS_AXIS: 062
OD_AXIS: 103
OS_ADD: +2.25
OD_SPHERE: -0.75
OD_ADD: +2.25
OS_CYLINDER: -2.00
OD_CYLINDER: -1.75

## 2021-06-08 ENCOUNTER — VIRTUAL VISIT (OUTPATIENT)
Dept: FAMILY MEDICINE CLINIC | Age: 61
End: 2021-06-08
Payer: COMMERCIAL

## 2021-06-08 DIAGNOSIS — F32.A DEPRESSION, UNSPECIFIED DEPRESSION TYPE: Primary | ICD-10-CM

## 2021-06-08 PROCEDURE — 99213 OFFICE O/P EST LOW 20 MIN: CPT | Performed by: NURSE PRACTITIONER

## 2021-06-08 RX ORDER — BUPROPION HYDROCHLORIDE 150 MG/1
150 TABLET ORAL EVERY MORNING
Qty: 30 TABLET | Refills: 3 | Status: SHIPPED | OUTPATIENT
Start: 2021-06-08 | End: 2021-10-06

## 2021-06-08 SDOH — ECONOMIC STABILITY: FOOD INSECURITY: WITHIN THE PAST 12 MONTHS, YOU WORRIED THAT YOUR FOOD WOULD RUN OUT BEFORE YOU GOT MONEY TO BUY MORE.: NEVER TRUE

## 2021-06-08 SDOH — ECONOMIC STABILITY: FOOD INSECURITY: WITHIN THE PAST 12 MONTHS, THE FOOD YOU BOUGHT JUST DIDN'T LAST AND YOU DIDN'T HAVE MONEY TO GET MORE.: NEVER TRUE

## 2021-06-08 ASSESSMENT — ENCOUNTER SYMPTOMS
WHEEZING: 0
COUGH: 0
SHORTNESS OF BREATH: 0

## 2021-06-08 ASSESSMENT — SOCIAL DETERMINANTS OF HEALTH (SDOH): HOW HARD IS IT FOR YOU TO PAY FOR THE VERY BASICS LIKE FOOD, HOUSING, MEDICAL CARE, AND HEATING?: NOT VERY HARD

## 2021-06-08 NOTE — PROGRESS NOTES
2021    TELEHEALTH EVALUATION -- Audio/Visual (During WOM-06 public health emergency)    HPI:    Stephanie Yu (:  1960) has requested an audio/video evaluation for the following concern(s):    Pt presents to the clinic to discuss medication. He states that he is in a funk. He feels fatigued and no energy to do anything. He was on wellbutrin previously but has not been on any medication for awhile. He feels that his depression has worsened over time. He denies thoughts of suicide or homicide. He would like to restart the wellbutrin. He is due for labs. He has no further concerns. Review of Systems   Constitutional: Positive for fatigue. Negative for activity change, appetite change and fever. Respiratory: Negative for cough, shortness of breath and wheezing. Cardiovascular: Negative for chest pain and palpitations. Neurological: Negative for dizziness, light-headedness and headaches. Psychiatric/Behavioral: Negative for sleep disturbance and suicidal ideas. The patient is nervous/anxious. Depression       Prior to Visit Medications    Medication Sig Taking?  Authorizing Provider   buPROPion (WELLBUTRIN XL) 150 MG extended release tablet Take 1 tablet by mouth every morning Yes SHAKIRA Garza CNP   Testosterone (ANDROGEL) 20.25 MG/ACT (1.62%) GEL gel APPLY 4 PUMP ACTUATIONS  TOPICALLY ONCE DAILY  SHAKIRA Garza CNP   tadalafil (CIALIS) 5 MG tablet Take 1 tablet by mouth daily as needed for Erectile Dysfunction  SHAKIRA Garza CNP   hydroxychloroquine (PLAQUENIL) 200 MG tablet TAKE 2 TABLETS BY MOUTH ONCE DAILY  Historical Provider, MD   sildenafil (VIAGRA) 50 MG tablet TAKE 1 TABLET BY MOUTH AS NEEDED FOR  ERECTILE DYSFUNCTION  SHAKIRA Garza CNP   rivaroxaban (XARELTO) 20 MG TABS tablet Take 1 tablet by mouth daily (with breakfast)  SHAKIRA Garza CNP   metFORMIN (GLUCOPHAGE) 500 MG tablet

## 2021-07-03 ENCOUNTER — HOSPITAL ENCOUNTER (OUTPATIENT)
Dept: LAB | Age: 61
Discharge: HOME | End: 2021-07-03
Payer: COMMERCIAL

## 2021-07-03 DIAGNOSIS — E34.9 HYPOTESTOSTERONISM: ICD-10-CM

## 2021-07-03 DIAGNOSIS — R73.01 IMPAIRED FASTING GLUCOSE: ICD-10-CM

## 2021-07-03 DIAGNOSIS — E78.2 MIXED HYPERLIPIDEMIA: ICD-10-CM

## 2021-07-03 LAB
ALBUMIN SERPL-MCNC: 4.3 G/DL (ref 3.5–5.2)
ALBUMIN/GLOBULIN RATIO: 1.4 (ref 1–2.5)
ALP BLD-CCNC: 81 U/L (ref 40–129)
ALT SERPL-CCNC: 25 U/L (ref 5–41)
ANION GAP SERPL CALCULATED.3IONS-SCNC: 10 MMOL/L (ref 9–17)
AST SERPL-CCNC: 21 U/L
BILIRUB SERPL-MCNC: 0.45 MG/DL (ref 0.3–1.2)
BUN BLDV-MCNC: 19 MG/DL (ref 6–20)
BUN/CREAT BLD: 21 (ref 9–20)
CALCIUM SERPL-MCNC: 9.5 MG/DL (ref 8.6–10.4)
CHLORIDE BLD-SCNC: 105 MMOL/L (ref 98–107)
CHOLESTEROL/HDL RATIO: 5.7
CHOLESTEROL: 142 MG/DL
CO2: 26 MMOL/L (ref 20–31)
CREAT SERPL-MCNC: 0.92 MG/DL (ref 0.7–1.2)
CREATININE URINE: 154.5 MG/DL (ref 39–259)
GFR AFRICAN AMERICAN: >60 ML/MIN
GFR NON-AFRICAN AMERICAN: >60 ML/MIN
GFR SERPL CREATININE-BSD FRML MDRD: ABNORMAL ML/MIN/{1.73_M2}
GFR SERPL CREATININE-BSD FRML MDRD: ABNORMAL ML/MIN/{1.73_M2}
GLUCOSE BLD-MCNC: 133 MG/DL (ref 70–99)
HDLC SERPL-MCNC: 25 MG/DL
LDL CHOLESTEROL: 82 MG/DL (ref 0–130)
MICROALBUMIN/CREAT 24H UR: 15 MG/L
MICROALBUMIN/CREAT UR-RTO: 10 MCG/MG CREAT
POTASSIUM SERPL-SCNC: 4.4 MMOL/L (ref 3.7–5.3)
SEX HORMONE BINDING GLOBULIN: 18 NMOL/L (ref 11–80)
SODIUM BLD-SCNC: 141 MMOL/L (ref 135–144)
TESTOSTERONE FREE-NONMALE: 195.6 PG/ML (ref 47–244)
TESTOSTERONE TOTAL: 668 NG/DL (ref 220–1000)
TOTAL PROTEIN: 7.4 G/DL (ref 6.4–8.3)
TRIGL SERPL-MCNC: 176 MG/DL
VLDLC SERPL CALC-MCNC: ABNORMAL MG/DL (ref 1–30)

## 2021-07-03 PROCEDURE — 80061 LIPID PANEL: CPT

## 2021-07-03 PROCEDURE — 82043 UR ALBUMIN QUANTITATIVE: CPT

## 2021-07-03 PROCEDURE — 80053 COMPREHEN METABOLIC PANEL: CPT

## 2021-07-03 PROCEDURE — 83036 HEMOGLOBIN GLYCOSYLATED A1C: CPT

## 2021-07-03 PROCEDURE — 84270 ASSAY OF SEX HORMONE GLOBUL: CPT

## 2021-07-03 PROCEDURE — 36415 COLL VENOUS BLD VENIPUNCTURE: CPT

## 2021-07-03 PROCEDURE — 84403 ASSAY OF TOTAL TESTOSTERONE: CPT

## 2021-07-03 PROCEDURE — 82570 ASSAY OF URINE CREATININE: CPT

## 2021-07-04 LAB
ESTIMATED AVERAGE GLUCOSE: 123 MG/DL
HBA1C MFR BLD: 5.9 % (ref 4–6)

## 2021-07-09 DIAGNOSIS — E66.01 MORBID OBESITY (HCC): ICD-10-CM

## 2021-07-09 DIAGNOSIS — I26.99 OTHER ACUTE PULMONARY EMBOLISM WITHOUT ACUTE COR PULMONALE (HCC): ICD-10-CM

## 2021-07-09 DIAGNOSIS — Z79.01 CHRONIC ANTICOAGULATION: ICD-10-CM

## 2021-07-09 DIAGNOSIS — I82.411 DEEP VENOUS THROMBOSIS OF RIGHT PROFUNDA FEMORIS VEIN (HCC): ICD-10-CM

## 2021-07-09 DIAGNOSIS — E34.9 HYPOTESTOSTERONISM: ICD-10-CM

## 2021-07-09 NOTE — TELEPHONE ENCOUNTER
Geronimo Chatterjee called requesting a refill of the below medication which has been pended for you:     Requested Prescriptions     Pending Prescriptions Disp Refills    rivaroxaban (XARELTO) 20 MG TABS tablet 90 tablet 1     Sig: Take 1 tablet by mouth daily (with breakfast)       Last Appointment Date: 6/8/2021  Next Appointment Date: 8/3/2021    Allergies   Allergen Reactions    Gabapentin      GI side effects    Seasonal Other (See Comments)     RUNNY NOSE, WATERY EYES

## 2021-07-12 DIAGNOSIS — E78.2 MIXED HYPERLIPIDEMIA: ICD-10-CM

## 2021-07-12 DIAGNOSIS — E34.9 HYPOTESTOSTERONISM: Primary | ICD-10-CM

## 2021-07-12 RX ORDER — ATORVASTATIN CALCIUM 20 MG/1
20 TABLET, FILM COATED ORAL DAILY
Qty: 30 TABLET | Refills: 3 | Status: CANCELLED | OUTPATIENT
Start: 2021-07-12

## 2021-07-13 RX ORDER — ATORVASTATIN CALCIUM 20 MG/1
20 TABLET, FILM COATED ORAL DAILY
Qty: 30 TABLET | Refills: 3 | Status: CANCELLED | OUTPATIENT
Start: 2021-07-13

## 2021-07-13 RX ORDER — ATORVASTATIN CALCIUM 20 MG/1
20 TABLET, FILM COATED ORAL DAILY
Qty: 30 TABLET | Refills: 3 | Status: SHIPPED | OUTPATIENT
Start: 2021-07-13 | End: 2021-11-23 | Stop reason: SDUPTHER

## 2021-07-13 NOTE — TELEPHONE ENCOUNTER
----- Message from SHAKIRA Evangelista CNP sent at 7/10/2021  1:11 PM EDT -----  Labs are improved however his cholesterol is still not at goal. I would like patient to start lipitor 20mg daily #30 3 refills. Recheck lipid panel in 3 months. His A1C has improved. His testosterone level is WNL as well as his microalbumin. He is to continue all other medication.

## 2021-08-10 ENCOUNTER — VIRTUAL VISIT (OUTPATIENT)
Dept: FAMILY MEDICINE CLINIC | Age: 61
End: 2021-08-10
Payer: COMMERCIAL

## 2021-08-10 DIAGNOSIS — E34.9 HYPOTESTOSTERONISM: ICD-10-CM

## 2021-08-10 DIAGNOSIS — E66.01 MORBID OBESITY (HCC): ICD-10-CM

## 2021-08-10 DIAGNOSIS — I10 ESSENTIAL HYPERTENSION: ICD-10-CM

## 2021-08-10 DIAGNOSIS — R73.01 IMPAIRED FASTING GLUCOSE: ICD-10-CM

## 2021-08-10 DIAGNOSIS — E78.2 MIXED HYPERLIPIDEMIA: ICD-10-CM

## 2021-08-10 DIAGNOSIS — F32.A DEPRESSION, UNSPECIFIED DEPRESSION TYPE: Primary | ICD-10-CM

## 2021-08-10 PROCEDURE — 99214 OFFICE O/P EST MOD 30 MIN: CPT | Performed by: NURSE PRACTITIONER

## 2021-08-10 ASSESSMENT — PATIENT HEALTH QUESTIONNAIRE - PHQ9
1. LITTLE INTEREST OR PLEASURE IN DOING THINGS: 1
SUM OF ALL RESPONSES TO PHQ QUESTIONS 1-9: 2
SUM OF ALL RESPONSES TO PHQ QUESTIONS 1-9: 2
2. FEELING DOWN, DEPRESSED OR HOPELESS: 1
SUM OF ALL RESPONSES TO PHQ9 QUESTIONS 1 & 2: 2
SUM OF ALL RESPONSES TO PHQ QUESTIONS 1-9: 2

## 2021-08-10 NOTE — PROGRESS NOTES
8/10/2021    TELEHEALTH EVALUATION -- Audio/Visual (During [facility]- public health emergency)    HPI:    Mehul Mackey (:  1960) has requested an audio/video evaluation for the following concern(s):    Hypertension  This is a chronic problem. The current episode started more than 1 year ago. The problem is unchanged. Condition status: unable to assess today. Pertinent negatives include no anxiety, blurred vision, chest pain, headaches, malaise/fatigue, palpitations, peripheral edema or shortness of breath. There are no associated agents to hypertension. Risk factors for coronary artery disease include male gender, dyslipidemia and obesity. Past treatments include ACE inhibitors and diuretics. Compliance problems include diet and exercise. Hyperlipidemia  This is a chronic problem. The problem is controlled. Exacerbating diseases include obesity. He has no history of hypothyroidism. Factors aggravating his hyperlipidemia include fatty foods and thiazides. Pertinent negatives include no chest pain, leg pain, myalgias or shortness of breath. Current antihyperlipidemic treatment includes statins. The current treatment provides moderate improvement of lipids. Compliance problems include adherence to diet and adherence to exercise. Risk factors for coronary artery disease include hypertension, a sedentary lifestyle, obesity, male sex and dyslipidemia. Pt presents to the clinic for a medication check. He is currently on wellbutrin for his depression. He denies thoughts of suicide or homicide. He feels that the wellbutrin is working well. He feels that his moods are stable. He has energy and ambition to do things. He has a history of hypotestosteronism. He is on currently on testosterone supplementation. He feels that it is working well to help with his energy level and sexual drive. He had labs prior to the appt. His testosterone is WNL. He has a history of IFG. He does not check his blood sugar.  He is morbidly obese. He has started to  watch hist diet closely. He does not exercise routinely. He A1C is controlled at 5.9. he is taking metformin and tolerating it well.      Past Medical History:   Diagnosis Date    Cervical disc disease     Depression     Diabetes mellitus ([address]     PREDIABETIC    Erectile dysfunction     Hepatitis C     tx 6mon completed  Dr [facility]    History of gastroesophageal reflux (GERD)     Hypertension     Hypotestosteronism     Impaired fasting glucose     Laceration 2017    TOP OF HEAD    Neck pain     Obesity     Sleep apnea     CPAP MACHINE    Substance abuse in remission (HCC)     IV DRUG USER 30 YEARS AGO    Substance abuse in remission ([address]     ALCOHOLIC 20 YEARS    Testicular hypofunction     Wears glasses        Past Surgical History:   Procedure Laterality Date    CERVICAL FUSION  2017    C5-7    CERVICAL FUSION N/A 2017     ANTERIOR CERVICAL DECOMPRESSION FUSION C5-C7 performed by Carlos Miranda MD at [facility] COLONOSCOPY  2014    polyps    COLONOSCOPY  2015    sigmoid diverticulosis    HAMMER TOE SURGERY Left     2ND  AND 3RD TOE  [facility]    LIVER BIOPSY      TYMPANIC MEMBRANE REPAIR      MULTIPLE AS A CHILD       Social History     Socioeconomic History    Marital status:      Spouse name: None    Number of children: None    Years of education: None    Highest education level: None   Occupational History    None   Tobacco Use    Smoking status: Former Smoker     Packs/day: 1.50     Years: 9.00     Pack years: 13.50     Types: Cigarettes     Quit date: 1992     Years since quittin.0    Smokeless tobacco: Former User     Quit date: 1990    Tobacco comment: Leartis Close RRT 19   Substance and Sexual Activity    Alcohol use: No     Alcohol/week: 0.0 standard drinks     Comment: HISTORY OF ALCOHOL ABUSE 30 YEARS AGO quit     Drug use: No     Types: IV Comment: HISTORY OF IV DRUG USE 30 YEARS AGO    Sexual activity: None   Other Topics Concern    None   Social History Narrative    None     Social Determinants of Health     Financial Resource Strain: Low Risk     Difficulty of Paying Living Expenses: Not very hard   Food Insecurity: No Food Insecurity    Worried About Running Out of Food in the Last Year: Never true    Jonah of Food in the Last Year: Never true   Transportation Needs:     Lack of Transportation (Medical):      Lack of Transportation (Non-Medical):    Physical Activity:     Days of Exercise per Week:     Minutes of Exercise per Session:    Stress:     Feeling of Stress :    Social Connections:     Frequency of Communication with Friends and Family:     Frequency of Social Gatherings with Friends and Family:     Attends Muslim Services:     Active Member of Clubs or Organizations:     Attends Club or Organization Meetings:     Marital Status:    Intimate Partner Violence:     Fear of Current or Ex-Partner:     Emotionally Abused:     Physically Abused:     Sexually Abused:        Family History   Problem Relation Age of Onset    High Blood Pressure Mother     Diabetes Mother     Cataracts Mother     Cancer Father     Stroke Father     Other Brother         HEPATITIS C    Diabetes Sister     Diabetes Brother     Glaucoma Neg Hx        Allergies   Allergen Reactions    Gabapentin      GI side effects    Seasonal Other (See Comments)     RUNNY NOSE, WATERY EYES       Current Outpatient Medications   Medication Sig Dispense Refill    atorvastatin (LIPITOR) 20 MG tablet Take 1 tablet by mouth daily 30 tablet 3    rivaroxaban (XARELTO) 20 MG TABS tablet Take 1 tablet by mouth daily (with breakfast) 90 tablet 1    buPROPion (WELLBUTRIN XL) 150 MG extended release tablet Take 1 tablet by mouth every morning 30 tablet 3    Testosterone (ANDROGEL) 20.25 MG/ACT (1.62%) GEL gel APPLY 4 PUMP ACTUATIONS  TOPICALLY ONCE DAILY 120 actuation 5    tadalafil (CIALIS) 5 MG tablet Take 1 tablet by mouth daily as needed for Erectile Dysfunction 90 tablet 1    hydroxychloroquine (PLAQUENIL) 200 MG tablet TAKE 2 TABLETS BY MOUTH ONCE DAILY      sildenafil (VIAGRA) 50 MG tablet TAKE 1 TABLET BY MOUTH AS NEEDED FOR  ERECTILE DYSFUNCTION 30 tablet 1    metFORMIN (GLUCOPHAGE) 500 MG tablet TAKE TWO TABLETS BY MOUTH DAILY WITH BREAKFAST 180 tablet 1    lisinopril-hydroCHLOROthiazide (PRINZIDE;ZESTORETIC) 20-25 MG per tablet TAKE ONE TABLET BY MOUTH DAILY 90 tablet 1    therapeutic multivitamin-minerals (THERAGRAN-M) tablet Take 1 tablet by mouth daily. No current facility-administered medications for this visit. Review of Systems   Constitutional: Negative for activity change, appetite change, fatigue, fever and malaise/fatigue. Eyes: Negative for blurred vision. Respiratory: Negative for cough, shortness of breath and wheezing. Cardiovascular: Negative for chest pain and palpitations. Musculoskeletal: Negative for myalgias. Neurological: Negative for dizziness, light-headedness and headaches. Prior to Visit Medications    Medication Sig Taking?  Authorizing Provider   atorvastatin (LIPITOR) 20 MG tablet Take 1 tablet by mouth daily  SHAKIRA Brizuela CNP   rivaroxaban (XARELTO) 20 MG TABS tablet Take 1 tablet by mouth daily (with breakfast)  SHAKIRA Brizuela CNP   buPROPion (WELLBUTRIN XL) 150 MG extended release tablet Take 1 tablet by mouth every morning  SHAKIRA Brizuela CNP   Testosterone (ANDROGEL) 20.25 MG/ACT (1.62%) GEL gel APPLY 4 PUMP ACTUATIONS  TOPICALLY ONCE DAILY  SHAKIRA Brizuela CNP   tadalafil (CIALIS) 5 MG tablet Take 1 tablet by mouth daily as needed for Erectile Dysfunction  SHAKIRA Brizuela CNP   hydroxychloroquine (PLAQUENIL) 200 MG tablet TAKE 2 TABLETS BY MOUTH ONCE DAILY  Historical Provider, MD   sildenafil (VIAGRA) 50 MG tablet TAKE 1 TABLET BY MOUTH AS NEEDED FOR  ERECTILE DYSFUNCTION  SHAKIRA Morfin CNP   metFORMIN (GLUCOPHAGE) 500 MG tablet TAKE TWO TABLETS BY MOUTH DAILY WITH BREAKFAST  SHAKIRA Smyth CNP   lisinopril-hydroCHLOROthiazide (PRINZIDE;ZESTORETIC) 20-25 MG per tablet TAKE ONE TABLET BY MOUTH DAILY  SHAKIRA Morfin CNP   therapeutic multivitamin-minerals (THERAGRAN-M) tablet Take 1 tablet by mouth daily. Historical Provider, MD       Social History     Tobacco Use    Smoking status: Former Smoker     Packs/day: 1.50     Years: 9.00     Pack years: 13.50     Types: Cigarettes     Quit date: 1992     Years since quittin.0    Smokeless tobacco: Former User     Quit date: 1990    Tobacco comment: Yaron Kaye RRT 19   Substance Use Topics    Alcohol use: No     Alcohol/week: 0.0 standard drinks     Comment: HISTORY OF ALCOHOL ABUSE 30 YEARS AGO quit 1989    Drug use: No     Types: IV     Comment: HISTORY OF IV DRUG USE 30 YEARS AGO            PHYSICAL EXAMINATION:  [ INSTRUCTIONS:  \"[x]\" Indicates a positive item  \"[]\" Indicates a negative item  -- DELETE ALL ITEMS NOT EXAMINED]  Vital Signs: (As obtained by patient/caregiver or practitioner observation)    Pt unable to obtain vital signs. Constitutional: [x] Appears well-developed and well-nourished [x] No apparent distress      [] Abnormal-   Mental status  [x] Alert and awake  [x] Oriented to person/place/time []Able to follow commands      Eyes:  EOM    [x]  Normal  [] Abnormal-  Sclera  [x]  Normal  [] Abnormal -         Discharge [x]  None visible  [] Abnormal -    HENT:   [x] Normocephalic, atraumatic.   [] Abnormal   [x] Mouth/Throat: Mucous membranes are moist.     External Ears [x] Normal  [] Abnormal-     Neck: [x] No visualized mass     Pulmonary/Chest: [x] Respiratory effort normal.  [x] No visualized signs of difficulty breathing or respiratory distress        [] Abnormal-      Musculoskeletal:   [x] Normal gait with no signs of ataxia         [x] Normal range of motion of neck        [] Abnormal-       Neurological:        [x] No Facial Asymmetry (Cranial nerve 7 motor function) (limited exam to video visit)          [x] No gaze palsy        [] Abnormal-         Skin:        [x] No significant exanthematous lesions or discoloration noted on facial skin         [] Abnormal-            Psychiatric:       [x] Normal Affect [x] No Hallucinations        [] Abnormal-       ASSESSMENT/PLAN:  1. Depression, unspecified depression type  Stable continue current medication     2. Hypotestosteronism  Labs are WNL continue current dose  - Testosterone, Free; Future    3. Morbid obesity ([facility] [address]  Encourage healthy diet and exercise as tolerated    4. Essential hypertension  Unable to assess today. Continue current medication at this time    5. Mixed hyperlipidemia  Stable continue current medication  - Lipid Panel; Future    6. Impaired fasting glucose  Stable continue current medication  - Comprehensive Metabolic Panel; Future  - Hemoglobin A1C; Future    Pt to return in 6 months for follow up  Pt to return PRN   Return in about 6 months (around 2/10/2022), or if symptoms worsen or fail to improve. Dusty Eugene, was evaluated through a synchronous (real-time) audio-video encounter. The patient (or guardian if applicable) is aware that this is a billable service. Verbal consent to proceed has been obtained within the past 12 months. The visit was conducted pursuant to the emergency declaration under the [facility], [address] [place] [address] authority and the [employer] General Act. Patient identification was verified, and a caregiver was present when appropriate. The patient was located in a state where the provider was credentialed to provide care.     Total time spent on this encounter: Not billed by time    --Tania Elder SHAKIRA Lr - CNP on 8/18/2021 at 1:08 PM    An electronic signature was used to authenticate this note.

## 2021-08-18 ASSESSMENT — ENCOUNTER SYMPTOMS
SHORTNESS OF BREATH: 0
COUGH: 0
BLURRED VISION: 0
WHEEZING: 0

## 2021-08-22 DIAGNOSIS — I10 ESSENTIAL HYPERTENSION: ICD-10-CM

## 2021-08-22 DIAGNOSIS — R73.01 IMPAIRED FASTING GLUCOSE: ICD-10-CM

## 2021-08-23 RX ORDER — LISINOPRIL AND HYDROCHLOROTHIAZIDE 25; 20 MG/1; MG/1
TABLET ORAL
Qty: 90 TABLET | Refills: 3 | Status: SHIPPED | OUTPATIENT
Start: 2021-08-23 | End: 2022-06-10

## 2021-10-01 NOTE — TELEPHONE ENCOUNTER
Juanita Orlando called requesting a refill of the below medication which has been pended for you:     Requested Prescriptions     Pending Prescriptions Disp Refills    hydroxychloroquine (PLAQUENIL) 200 MG tablet 60 tablet 2     Sig: TAKE 2 TABLETS BY MOUTH ONCE DAILY       Last Appointment Date: 8/10/2021  Next Appointment Date: 11/11/2021    Allergies   Allergen Reactions    Gabapentin      GI side effects    Seasonal Other (See Comments)     RUNNY NOSE, WATERY EYES

## 2021-10-04 RX ORDER — HYDROXYCHLOROQUINE SULFATE 200 MG/1
TABLET, FILM COATED ORAL
Qty: 60 TABLET | Refills: 2 | OUTPATIENT
Start: 2021-10-04

## 2021-10-05 DIAGNOSIS — F32.0 CURRENT MILD EPISODE OF MAJOR DEPRESSIVE DISORDER, UNSPECIFIED WHETHER RECURRENT (HCC): Primary | ICD-10-CM

## 2021-10-06 RX ORDER — BUPROPION HYDROCHLORIDE 150 MG/1
TABLET ORAL
Qty: 30 TABLET | Refills: 2 | Status: SHIPPED | OUTPATIENT
Start: 2021-10-06 | End: 2022-10-31

## 2021-10-06 NOTE — TELEPHONE ENCOUNTER
Álvaro Miller called requesting a refill of the below medication which has been pended for you:     Requested Prescriptions     Pending Prescriptions Disp Refills    buPROPion (WELLBUTRIN XL) 150 MG extended release tablet [Pharmacy Med Name: buPROPion HCl ER (XL) 150 MG Oral Tablet Extended Release 24 Hour] 30 tablet 0     Sig: TAKE 1 TABLET BY MOUTH ONCE DAILY IN THE MORNING       Last Appointment Date: 8/10/2021  Next Appointment Date: 11/11/2021    Allergies   Allergen Reactions    Gabapentin      GI side effects    Seasonal Other (See Comments)     RUNNY NOSE, WATERY EYES

## 2021-10-26 ENCOUNTER — OFFICE VISIT (OUTPATIENT)
Dept: ONCOLOGY | Age: 61
End: 2021-10-26
Payer: COMMERCIAL

## 2021-10-26 VITALS
BODY MASS INDEX: 37.51 KG/M2 | HEIGHT: 70 IN | DIASTOLIC BLOOD PRESSURE: 82 MMHG | SYSTOLIC BLOOD PRESSURE: 132 MMHG | WEIGHT: 262 LBS

## 2021-10-26 DIAGNOSIS — I26.99 OTHER ACUTE PULMONARY EMBOLISM WITHOUT ACUTE COR PULMONALE (HCC): Primary | ICD-10-CM

## 2021-10-26 DIAGNOSIS — E34.9 HYPOTESTOSTERONISM: ICD-10-CM

## 2021-10-26 DIAGNOSIS — E66.01 MORBID OBESITY (HCC): ICD-10-CM

## 2021-10-26 DIAGNOSIS — I82.411 DEEP VENOUS THROMBOSIS OF RIGHT PROFUNDA FEMORIS VEIN (HCC): ICD-10-CM

## 2021-10-26 DIAGNOSIS — Z79.01 CHRONIC ANTICOAGULATION: ICD-10-CM

## 2021-10-26 PROCEDURE — 99214 OFFICE O/P EST MOD 30 MIN: CPT | Performed by: INTERNAL MEDICINE

## 2021-10-26 NOTE — PROGRESS NOTES
Samantha Jackson                                                                                                                  10/26/2021  MRN:   [id number]  YOB: 1960  PCP:                           SHAKIRA Guy CNP  Referring Physician: No ref. provider found  Treating Physician Name: Daily Nieto MD      Reason for visit:  Chief Complaint   Patient presents with    Follow-up     DVT       Current problems/ Active and recent treatments:  Right femoral, popliteal and gastrocnemius DVT  Segmental and subsegmental pulmonary embolism. Hypogonadism on testosterone iron supplementation  Arthritis    Interval history:     Patient presents to the clinic for toxicity check and to discuss further treatment plan. Continues to be on Xarelto without any adverse events. Patient is struggling with arthritis and lately took Aleve few  times we did help him a lot. Patient was told by his rheumatologist not to take Aleve while on Xarelto. Patient denies noticing easy bruising or excessive bleeding while he was on Aleve and Xarelto. Denies smoking. Continues to be on testosterone supplementation. Continues to be active. During this visit patient's allergy, social, medical, surgical history and medications were reviewed and updated. Summary of Case/History:    Samantha Jackson a 63 y.o.male presents to the office to establish care and for further evaluation treatment recommendation regarding anticoagulation for patient's acute DVT and pulmonary embolism  Patient started noticing swelling of his right lower extremity back in July 2019. Work-up including ultrasound of lower extremity confirmed right lower extremity DVT involving femoral popliteal and gastrocnemius vein. Additional work-up including CT chest showed segmental and subsegmental very embolism. Patient was started on anticoagulation and discharged home on Xarelto.   Patient recalls any surgery travel trauma before the pulmonary embolism and DVT. Patient does not smoke. He does not give any personal history of previous venous thrombi embolism. Does not give any family history of venous thrombi embolism. Patient is on testosterone supplementation. His BMI is 39.4. Patient has been tolerating anticoagulation without any complications or severe side effects.       Past Medical History:   Past Medical History:   Diagnosis Date    Cervical disc disease     Depression     Diabetes mellitus ([address]     PREDIABETIC    Erectile dysfunction     Hepatitis C     tx 6mon completed  Dr Jossue Castaneda TREATMENT    History of gastroesophageal reflux (GERD)     Hypertension     Hypotestosteronism     Impaired fasting glucose     Laceration 2017    TOP OF HEAD    Neck pain     Obesity     Sleep apnea     CPAP MACHINE    Substance abuse in remission ([address]     IV DRUG USER 30 YEARS AGO    Substance abuse in remission ([facility] [address]     ALCOHOLIC 20 YEARS    Testicular hypofunction     Wears glasses        Past Surgical History:     Past Surgical History:   Procedure Laterality Date    CERVICAL FUSION  2017    C5-7    CERVICAL FUSION N/A 2017     ANTERIOR CERVICAL DECOMPRESSION FUSION C5-C7 performed by Jayro López MD at [facility] COLONOSCOPY  2014    polyps    COLONOSCOPY  2015    sigmoid diverticulosis    HAMMER TOE SURGERY Left     2ND  AND 3RD TOE  [facility]    LIVER BIOPSY      TYMPANIC MEMBRANE REPAIR      MULTIPLE AS A CHILD       Patient Family Social History:     Social History     Socioeconomic History    Marital status:      Spouse name: None    Number of children: None    Years of education: None    Highest education level: None   Occupational History    None   Tobacco Use    Smoking status: Former Smoker     Packs/day: 1.50     Years: 9.00     Pack years: 13.50     Types: Cigarettes     Quit date: 1992     Years since quittin.2    Smokeless tobacco: Former User     Quit date: 8/14/1990    Tobacco comment: Zane Muir RRT 7/1/19   Substance and Sexual Activity    Alcohol use: No     Alcohol/week: 0.0 standard drinks     Comment: HISTORY OF ALCOHOL ABUSE 30 YEARS AGO quit 1989    Drug use: No     Types: IV     Comment: HISTORY OF IV DRUG USE 30 YEARS AGO    Sexual activity: None   Other Topics Concern    None   Social History Narrative    None     Social Determinants of Health     Financial Resource Strain: Low Risk     Difficulty of Paying Living Expenses: Not very hard   Food Insecurity: No Food Insecurity    Worried About Running Out of Food in the Last Year: Never true    Jonah of Food in the Last Year: Never true   Transportation Needs:     Lack of Transportation (Medical):      Lack of Transportation (Non-Medical):    Physical Activity:     Days of Exercise per Week:     Minutes of Exercise per Session:    Stress:     Feeling of Stress :    Social Connections:     Frequency of Communication with Friends and Family:     Frequency of Social Gatherings with Friends and Family:     Attends Latter-day Services:     Active Member of Clubs or Organizations:     Attends Club or Organization Meetings:     Marital Status:    Intimate Partner Violence:     Fear of Current or Ex-Partner:     Emotionally Abused:     Physically Abused:     Sexually Abused:      Family History   Problem Relation Age of Onset    High Blood Pressure Mother     Diabetes Mother     Cataracts Mother     Cancer Father     Stroke Father     Other Brother         HEPATITIS C    Diabetes Sister     Diabetes Brother     Glaucoma Neg Hx        Current Medications:     Current Outpatient Medications   Medication Sig Dispense Refill    buPROPion (WELLBUTRIN XL) 150 MG extended release tablet TAKE 1 TABLET BY MOUTH ONCE DAILY IN THE MORNING 30 tablet 2    lisinopril-hydroCHLOROthiazide (PRINZIDE;ZESTORETIC) 20-25 MG per tablet Take 1 tablet by mouth once daily 90 tablet 3    metFORMIN (GLUCOPHAGE) 500 MG tablet TAKE 2 TABLETS BY MOUTH ONCE DAILY WITH BREAKFAST 180 tablet 3    atorvastatin (LIPITOR) 20 MG tablet Take 1 tablet by mouth daily 30 tablet 3    rivaroxaban (XARELTO) 20 MG TABS tablet Take 1 tablet by mouth daily (with breakfast) 90 tablet 1    Testosterone (ANDROGEL) 20.25 MG/ACT (1.62%) GEL gel APPLY 4 PUMP ACTUATIONS  TOPICALLY ONCE DAILY 120 actuation 5    tadalafil (CIALIS) 5 MG tablet Take 1 tablet by mouth daily as needed for Erectile Dysfunction 90 tablet 1    hydroxychloroquine (PLAQUENIL) 200 MG tablet TAKE 2 TABLETS BY MOUTH ONCE DAILY      sildenafil (VIAGRA) 50 MG tablet TAKE 1 TABLET BY MOUTH AS NEEDED FOR  ERECTILE DYSFUNCTION 30 tablet 1    therapeutic multivitamin-minerals (THERAGRAN-M) tablet Take 1 tablet by mouth daily. No current facility-administered medications for this visit. Allergies:   Gabapentin and Seasonal    Review of Systems:    Constitutional: No fever or chills. No night sweats, no weight loss   Eyes: No eye discharge, double vision, or eye pain   HEENT: negative for sore mouth, sore throat, hoarseness and voice change   Respiratory: negative for cough , sputum, dyspnea, wheezing, hemoptysis, chest pain   Cardiovascular: negative for chest pain, dyspnea, palpitations, orthopnea, PND   Gastrointestinal: negative for nausea, vomiting, diarrhea, constipation, abdominal pain, Dysphagia, hematemesis and hematochezia   Genitourinary: negative for frequency, dysuria, nocturia, urinary incontinence, and hematuria   Integument: negative for rash, skin lesions, bruises.    Hematologic/Lymphatic: negative for easy bruising, bleeding, lymphadenopathy, or petechiae   Endocrine: negative for heat or cold intolerance,weight changes, change in bowel habits and hair loss   Musculoskeletal: Positive joint pain  Neurological: negative for headaches, dizziness, seizures, weakness, numbness        Physical Exam:    Vitals: BP 132/82   Ht 5' 10\" (1.778 m)   Wt 262 lb (118.8 kg)   BMI 37.59 kg/m²   General appearance - well appearing, no in pain or distress  Mental status - AAO X3  Eyes - pupils equal and reactive, extraocular eye movements intact  Mouth - mucous membranes moist, pharynx normal without lesions  Neck - supple, no significant adenopathy  Lymphatics - no palpable lymphadenopathy, no hepatosplenomegaly  Chest - clear to auscultation, no wheezes, rales or rhonchi, symmetric air entry  Heart - normal rate, regular rhythm, normal S1, S2, no murmurs  Abdomen - soft, nontender, nondistended, no masses or organomegaly  Neurological - alert, oriented, normal speech, no focal findings or movement disorder noted  Extremities - peripheral pulses normal, no pedal edema, no clubbing or cyanosis  Skin - normal coloration and turgor, no rashes, no suspicious skin lesions noted       DATA:    Results for orders placed or performed during the hospital encounter of 07/03/21   Testosterone, Free   Result Value Ref Range    Testosterone 668 220 - 1,000 ng/dL    Sex Hormone Binding 18 11 - 80 nmol/L    Testosterone, Free 195.6 47 - 244 pg/mL   Lipid Panel   Result Value Ref Range    Cholesterol 142 <200 mg/dL    HDL 25 (L) >40 mg/dL    LDL Cholesterol 82 0 - 130 mg/dL    Chol/HDL Ratio 5.7 (H) <5    Triglycerides 176 (H) <150 mg/dL    VLDL NOT REPORTED (H) 1 - 30 mg/dL   Comprehensive Metabolic Panel   Result Value Ref Range    Glucose 133 (H) 70 - 99 mg/dL    BUN 19 6 - 20 mg/dL    CREATININE 0.92 0.70 - 1.20 mg/dL    Bun/Cre Ratio 21 (H) 9 - 20    Calcium 9.5 8.6 - 10.4 mg/dL    Sodium 141 135 - 144 mmol/L    Potassium 4.4 3.7 - 5.3 mmol/L    Chloride 105 98 - 107 mmol/L    CO2 26 20 - 31 mmol/L    Anion Gap 10 9 - 17 mmol/L    Alkaline Phosphatase 81 40 - 129 U/L    ALT 25 5 - 41 U/L    AST 21 <40 U/L    Total Bilirubin 0.45 0.3 - 1.2 mg/dL    Total Protein 7.4 6.4 - 8.3 g/dL    Albumin 4.3 3.5 - 5.2 g/dL    Albumin/Globulin Ratio 1.4 1.0 - 2. 5    GFR Non-African American >60 >60 mL/min    GFR African American >60 >60 mL/min    GFR Comment          GFR Staging NOT REPORTED    Hemoglobin A1C   Result Value Ref Range    Hemoglobin A1C 5.9 4.0 - 6.0 %    Estimated Avg Glucose 123 mg/dL   Microalbumin, Ur   Result Value Ref Range    Microalb, Ur 15 <21 mg/L    Creatinine, Ur 154.5 39.0 - 259.0 mg/dL    Microalb/Crt. Ratio 10 <17 mcg/mg creat     CT chest 7/2019      Impression   Intraluminal pulmonary arterial filling defects within segmental and   subsegmental branches within the right upper lobe.  Subsegmental filling   defect within the lower lobes bilaterally.  Findings compatible with   multifocal pulmonary emboli.  No evidence for pulmonary infarction or right   heart strain.       Findings were discussed with Ashok Buerger at 10:31 am on 7/1/2019.             Vl Dup Lower Extremity Venous Right    Result Date: 1/12/2021  EXAMINATION: DUPLEX VENOUS ULTRASOUND OF THE RIGHT LOWER EXTREMITY, 1/12/2021 7:20 am TECHNIQUE: Duplex ultrasound and Doppler images were obtained of the right lower extremity. COMPARISON: 07/01/2019 HISTORY: ORDERING SYSTEM PROVIDED HISTORY: Deep venous thrombosis of right profunda femoris vein (HCC) FINDINGS: The visualized veins of the right lower extremity are without evidence of acute thrombus. Residual thrombus is noted within the distal femoral, popliteal and posterior tibial veins with evidence of recanalized flow. No evidence of acute DVT in the right lower extremity with chronic/residual thrombosis as described. Impression:  Right lower extremity DVT involving femoral popliteal and gastrocnemius vein. Right segmental and subsegmental pulmonary embolism. Testosterone supplementation  BMI of 41.1  Hypertension  Depression    Plan:  Personally reviewed results of lab work-up and other relevant clinical data.     Patient thrombophilia work-up including factor V Leyden and prothrombin gene mutation were negative. Patient's other risk factor includes advancing age and BMI of 41.1. I believe patient's risk factors include obesity testosterone and advancing age. Patient is not willing to go off the testosterone supplements. I discussed risk and benefit of discontinuation of anticoagulation. Patient places more importance on avoiding any more episodes of venous thromboembolism and prefers to stay on anticoagulation as well as a testosterone supplements. We plan to continue anticoagulation. Patient has been tolerating anticoagulation without any adverse events. Okay to take Aleve while on Xarelto. Patient was counseled on taking NSAIDs advised to take as little as needed. Patient states that please help him advised him not to take more than Aleve 250 mg twice daily. I also advised to notify our office if he started noticing easy or spontaneous bruising or excessive bleeding and stop taking Aleve right away at that point. Ultrasound leg shows resolution of the DVT. We will continue to monitor patient and revisit risk and benefit of long-term anticoagulation return visit in 6 months    Patient had multiple questions which answered to the best of my ability. Pastor Ulloa MD        This note is created with the assistance of a speech recognition program.  While intending to generate a document that actually reflects the content of the visit, the document can still have some errors including those of syntax and sound a like substitutions which may escape proof reading. It such instances, actual meaning can be extrapolated by contextual diversion.

## 2021-11-03 DIAGNOSIS — I26.99 OTHER ACUTE PULMONARY EMBOLISM WITHOUT ACUTE COR PULMONALE (HCC): Primary | ICD-10-CM

## 2021-11-03 DIAGNOSIS — I82.411 DEEP VENOUS THROMBOSIS OF RIGHT PROFUNDA FEMORIS VEIN (HCC): ICD-10-CM

## 2021-11-16 ENCOUNTER — OFFICE VISIT (OUTPATIENT)
Dept: PODIATRY | Age: 61
End: 2021-11-16
Payer: COMMERCIAL

## 2021-11-16 VITALS
WEIGHT: 266 LBS | HEART RATE: 88 BPM | SYSTOLIC BLOOD PRESSURE: 120 MMHG | DIASTOLIC BLOOD PRESSURE: 78 MMHG | HEIGHT: 70 IN | BODY MASS INDEX: 38.08 KG/M2

## 2021-11-16 DIAGNOSIS — B35.1 DERMATOPHYTOSIS OF NAIL: ICD-10-CM

## 2021-11-16 DIAGNOSIS — E11.51 CONTROLLED TYPE 2 DM WITH PERIPHERAL CIRCULATORY DISORDER (HCC): Primary | ICD-10-CM

## 2021-11-16 PROCEDURE — 99999 PR OFFICE/OUTPT VISIT,PROCEDURE ONLY: CPT | Performed by: PODIATRIST

## 2021-11-16 PROCEDURE — 11720 DEBRIDE NAIL 1-5: CPT | Performed by: PODIATRIST

## 2021-11-16 NOTE — PROGRESS NOTES
Subjective:  Enrique Lee is a 63 y.o. male who presents to the office today for routine DM foot care. DM control improving. Currently denies F/C/N/V. Allergies   Allergen Reactions    Gabapentin      GI side effects    Seasonal Other (See Comments)     RUNNY NOSE, WATERY EYES       Past Medical History:   Diagnosis Date    Cervical disc disease     Depression     Diabetes mellitus ([facility]     PREDIABETIC    Erectile dysfunction     Hepatitis C     tx 6mon completed 8/913 Dr Rudy [place] TREATMENT    History of gastroesophageal reflux (GERD)     Hypertension     Hypotestosteronism     Impaired fasting glucose     Laceration 05/06/2017    TOP OF HEAD    Neck pain     Obesity     Sleep apnea     CPAP MACHINE    Substance abuse in remission ([facility] [address]     IV DRUG USER 30 YEARS AGO    Substance abuse in remission ([facility] [address]     ALCOHOLIC 20 YEARS    Testicular hypofunction 2015    Wears glasses        Prior to Admission medications    Medication Sig Start Date End Date Taking?  Authorizing Provider   buPROPion (WELLBUTRIN XL) 150 MG extended release tablet TAKE 1 TABLET BY MOUTH ONCE DAILY IN THE MORNING 10/6/21  Yes Андрей Horn APRN - DONTRELL   lisinopril-hydroCHLOROthiazide (PRINZIDE;ZESTORETIC) 20-25 MG per tablet Take 1 tablet by mouth once daily 8/23/21  Yes Андрей Horn APRN - CNP   metFORMIN (GLUCOPHAGE) 500 MG tablet TAKE 2 TABLETS BY MOUTH ONCE DAILY WITH BREAKFAST 8/23/21  Yes Андрей Horn APRN - CNP   atorvastatin (LIPITOR) 20 MG tablet Take 1 tablet by mouth daily 7/13/21  Yes Андрей Horn APRN - CNP   rivaroxaban (XARELTO) 20 MG TABS tablet Take 1 tablet by mouth daily (with breakfast) 7/9/21  Yes Андрей Horn APRN - CNP   tadalafil (CIALIS) 5 MG tablet Take 1 tablet by mouth daily as needed for Erectile Dysfunction 5/11/21  Yes Андрей Horn APRN - DONTRELL   hydroxychloroquine (PLAQUENIL) 200 MG tablet TAKE 2 TABLETS BY MOUTH ONCE DAILY 21  Yes Historical Provider, MD   sildenafil (VIAGRA) 50 MG tablet TAKE 1 TABLET BY MOUTH AS NEEDED FOR  ERECTILE DYSFUNCTION 21  Yes SHAKIRA Whitehead CNP   therapeutic multivitamin-minerals (THERAGRAN-M) tablet Take 1 tablet by mouth daily. Yes Historical Provider, MD   Testosterone (ANDROGEL) 20.25 MG/ACT (1.62%) GEL gel APPLY 4 PUMP ACTUATIONS  TOPICALLY ONCE DAILY 5/11/21 10/26/21  SHAKIRA Whitehead CNP       Past Surgical History:   Procedure Laterality Date    CERVICAL FUSION  2017    C5-7    CERVICAL FUSION N/A 2017     ANTERIOR CERVICAL DECOMPRESSION FUSION C5-C7 performed by Juan Dawson MD at [facility] COLONOSCOPY  2014    polyps    COLONOSCOPY  2015    sigmoid diverticulosis    HAMMER TOE SURGERY Left     2ND  AND 3RD TOE DR [facility]    LIVER BIOPSY      TYMPANIC MEMBRANE REPAIR      MULTIPLE AS A CHILD       Family History   Problem Relation Age of Onset    High Blood Pressure Mother     Diabetes Mother     Cataracts Mother     Cancer Father     Stroke Father     Other Brother         HEPATITIS C    Diabetes Sister     Diabetes Brother     Glaucoma Neg Hx        Social History     Tobacco Use    Smoking status: Former Smoker     Packs/day: 1.50     Years: 9.00     Pack years: 13.50     Types: Cigarettes     Quit date: 1992     Years since quittin.2    Smokeless tobacco: Former User     Quit date: 1990    Tobacco comment: Zahraa Hernandez RRT 19   Substance Use Topics    Alcohol use: No     Alcohol/week: 0.0 standard drinks     Comment: HISTORY OF ALCOHOL ABUSE 30 YEARS AGO quit        Review of Systems: All 12 systems reviewed and pertinent positives noted above. Lower Extremity Physical Examination:     Vitals:   Vitals:    21 1612   BP: 120/78   Pulse: 88     General: AAO x 3 in NAD.      Vascular: DP and PT pulses palpable 2/4, bilateral.  CFT <3 seconds, bilateral.  Hair growth present to the level of the digits, bilateral.  Edema present, bilateral.  Varicosities present, bilateral. Erythema absent, bilateral. Distal Rubor absent bilateral.  Temperature within normal limits bilateral. Hyperpigmentation present bilateral. Atrophic skin yes. Neurological: Sensation intact to light touch to level of digits, bilateral.  Protective sensation intact 10/10 sites via 5.07/10g [place] Monofilament, bilateral.  negative Tinel's, bilateral.  negative Valleix sign, bilateral.  Vibratory intact bilateral.  Reflexes Decreased bilateral.  Paresthesias negative  Dysthesias negative. Sharp/dull intact bilateral.    Musculoskeletal: Muscle strength 5/5, bilateral.  Pain absent upon palpation of above mentioned hammerote. within normal limits medial longitudinal arch, Bilateral.  Ankle ROM decreased,Bilateral.  1st MPJ ROM within normal limits, Bilateral.  Dorsally contracted digits present digits , Bilateral.  negative Lachman's test.  Other foot deformities none. Integument: Warm, dry, supple, bilateral.  Open lesion absent, bilateral.  Interdigital maceration absent to web spaces bilateral.   Nails left 1  and right 1 thickened, dystrophic and crumbly, discolored with subungual debris. Fissures absent, bilateral. Hyperkeratotic tissue is absent. Asessment: Patient is a 63 y.o. male with:    Diagnosis Orders   1. Controlled type 2 DM with peripheral circulatory disorder ([facility] [address]     2. Dermatophytosis of nail         Plan: Patient examined and evaluated. Current condition and treatment options discussed in detail. DM foot ed and exam  All nails as mentioned above debrided in length and thickness. Patient advised OTC methods for treatment of the mycotic nails. Contact office with any questions/problems/concerns.   RTC in 3 months

## 2021-11-19 DIAGNOSIS — E34.9 HYPOTESTOSTERONISM: ICD-10-CM

## 2021-11-19 RX ORDER — TESTOSTERONE 20.25 MG/1.25G
GEL TOPICAL
Qty: 150 G | Refills: 2 | Status: SHIPPED | OUTPATIENT
Start: 2021-11-19 | End: 2022-02-07 | Stop reason: SDUPTHER

## 2021-11-19 NOTE — TELEPHONE ENCOUNTER
Dandre Wei called requesting a refill of the below medication which has been pended for you:     Requested Prescriptions     Pending Prescriptions Disp Refills    Testosterone 1.62 % GEL [Pharmacy Med Name: Testosterone 1.62 % Transdermal Gel] 150 g 0     Sig: APPLY 4 PUMP ACTUATIONS TOPICALLY ONCE DAILY       Last Appointment Date: 8/10/2021  Next Appointment Date: 11/23/2021    Allergies   Allergen Reactions    Gabapentin      GI side effects    Seasonal Other (See Comments)     RUNNY NOSE, WATERY EYES

## 2021-11-20 NOTE — TELEPHONE ENCOUNTER
Medication refilled. Pt needs medication contract    Controlled Substance Monitoring:    Acute and Chronic Pain Monitoring:   RX Monitoring 11/19/2021   Attestation -   Periodic Controlled Substance Monitoring Obtaining appropriate analgesic effect of treatment. ;No signs of potential drug abuse or diversion identified.

## 2021-11-23 ENCOUNTER — OFFICE VISIT (OUTPATIENT)
Dept: FAMILY MEDICINE CLINIC | Age: 61
End: 2021-11-23
Payer: COMMERCIAL

## 2021-11-23 ENCOUNTER — OFFICE VISIT (OUTPATIENT)
Dept: OPTOMETRY | Age: 61
End: 2021-11-23
Payer: COMMERCIAL

## 2021-11-23 VITALS
WEIGHT: 267 LBS | HEIGHT: 71 IN | SYSTOLIC BLOOD PRESSURE: 132 MMHG | DIASTOLIC BLOOD PRESSURE: 78 MMHG | BODY MASS INDEX: 37.38 KG/M2 | HEART RATE: 68 BPM | OXYGEN SATURATION: 97 %

## 2021-11-23 DIAGNOSIS — Z23 FLU VACCINE NEED: ICD-10-CM

## 2021-11-23 DIAGNOSIS — Z79.01 CHRONIC ANTICOAGULATION: ICD-10-CM

## 2021-11-23 DIAGNOSIS — E78.2 MIXED HYPERLIPIDEMIA: Primary | ICD-10-CM

## 2021-11-23 DIAGNOSIS — H43.813 POSTERIOR VITREOUS DETACHMENT OF BOTH EYES: ICD-10-CM

## 2021-11-23 DIAGNOSIS — E34.9 HYPOTESTOSTERONISM: ICD-10-CM

## 2021-11-23 DIAGNOSIS — H53.8 BLURRED VISION, BILATERAL: ICD-10-CM

## 2021-11-23 DIAGNOSIS — Z12.11 ENCOUNTER FOR SCREENING COLONOSCOPY: ICD-10-CM

## 2021-11-23 DIAGNOSIS — K64.9 HEMORRHOIDS, UNSPECIFIED HEMORRHOID TYPE: ICD-10-CM

## 2021-11-23 DIAGNOSIS — H25.813 COMBINED FORMS OF AGE-RELATED CATARACT OF BOTH EYES: ICD-10-CM

## 2021-11-23 DIAGNOSIS — F32.A DEPRESSION, UNSPECIFIED DEPRESSION TYPE: ICD-10-CM

## 2021-11-23 DIAGNOSIS — E66.01 MORBID OBESITY (HCC): ICD-10-CM

## 2021-11-23 DIAGNOSIS — E11.9 DIABETES MELLITUS TYPE 2, NONINSULIN DEPENDENT (HCC): Primary | ICD-10-CM

## 2021-11-23 DIAGNOSIS — R73.01 IMPAIRED FASTING GLUCOSE: ICD-10-CM

## 2021-11-23 DIAGNOSIS — I82.411 DEEP VENOUS THROMBOSIS OF RIGHT PROFUNDA FEMORIS VEIN (HCC): ICD-10-CM

## 2021-11-23 PROCEDURE — 92250 FUNDUS PHOTOGRAPHY W/I&R: CPT | Performed by: OPTOMETRIST

## 2021-11-23 PROCEDURE — 99214 OFFICE O/P EST MOD 30 MIN: CPT | Performed by: NURSE PRACTITIONER

## 2021-11-23 PROCEDURE — 99214 OFFICE O/P EST MOD 30 MIN: CPT | Performed by: OPTOMETRIST

## 2021-11-23 PROCEDURE — 90471 IMMUNIZATION ADMIN: CPT | Performed by: NURSE PRACTITIONER

## 2021-11-23 PROCEDURE — 90686 IIV4 VACC NO PRSV 0.5 ML IM: CPT | Performed by: NURSE PRACTITIONER

## 2021-11-23 RX ORDER — ATORVASTATIN CALCIUM 20 MG/1
20 TABLET, FILM COATED ORAL DAILY
Qty: 90 TABLET | Refills: 1 | Status: SHIPPED | OUTPATIENT
Start: 2021-11-23 | End: 2022-06-10

## 2021-11-23 RX ORDER — TROPICAMIDE 10 MG/ML
1 SOLUTION/ DROPS OPHTHALMIC ONCE
Status: COMPLETED | OUTPATIENT
Start: 2021-11-23 | End: 2021-11-23

## 2021-11-23 RX ADMIN — TROPICAMIDE 1 DROP: 10 SOLUTION/ DROPS OPHTHALMIC at 15:11

## 2021-11-23 ASSESSMENT — TONOMETRY
IOP_METHOD: NON-CONTACT AIR PUFF
OS_IOP_MMHG: 22
OD_IOP_MMHG: 19

## 2021-11-23 ASSESSMENT — VISUAL ACUITY
OD_CC+: -1
CORRECTION_TYPE: GLASSES
OS_CC: 20/70
METHOD: SNELLEN - LINEAR

## 2021-11-23 ASSESSMENT — REFRACTION_WEARINGRX
OS_SPHERE: -0.75
OD_CYLINDER: -1.75
OD_SPHERE: -0.75
OS_ADD: +2.25
OS_CYLINDER: -2.00
OS_AXIS: 062
SPECS_TYPE: PAL
OD_AXIS: 103
OD_ADD: +2.25

## 2021-11-23 ASSESSMENT — SLIT LAMP EXAM - LIDS
COMMENTS: NORMAL
COMMENTS: NORMAL

## 2021-11-23 ASSESSMENT — ENCOUNTER SYMPTOMS
GASTROINTESTINAL NEGATIVE: 0
ALLERGIC/IMMUNOLOGIC NEGATIVE: 0
EYES NEGATIVE: 0
RESPIRATORY NEGATIVE: 0

## 2021-11-23 ASSESSMENT — PATIENT HEALTH QUESTIONNAIRE - PHQ9
SUM OF ALL RESPONSES TO PHQ QUESTIONS 1-9: 2
SUM OF ALL RESPONSES TO PHQ9 QUESTIONS 1 & 2: 2
SUM OF ALL RESPONSES TO PHQ QUESTIONS 1-9: 2
SUM OF ALL RESPONSES TO PHQ QUESTIONS 1-9: 2
1. LITTLE INTEREST OR PLEASURE IN DOING THINGS: 1
2. FEELING DOWN, DEPRESSED OR HOPELESS: 1

## 2021-11-23 NOTE — PROGRESS NOTES
Ashley Titus presents today for   Chief Complaint   Patient presents with    Cataract   . HPI     Cataract     In left eye. Associated symptoms include blurred vision. Onset was gradual.  Context:  distance vision and near vision. Since onset it is gradually worsening. Affected activities include reading and night driving. Treatments tried include glasses. Response to treatment was mild improvement. Comments     Left eye has been bothering him for a month now. Vision is very blurry out of his left eye. Current Outpatient Medications   Medication Sig Dispense Refill    atorvastatin (LIPITOR) 20 MG tablet Take 1 tablet by mouth daily 90 tablet 1    Testosterone 1.62 % GEL APPLY 4 PUMP ACTUATIONS TOPICALLY ONCE DAILY 150 g 2    buPROPion (WELLBUTRIN XL) 150 MG extended release tablet TAKE 1 TABLET BY MOUTH ONCE DAILY IN THE MORNING 30 tablet 2    lisinopril-hydroCHLOROthiazide (PRINZIDE;ZESTORETIC) 20-25 MG per tablet Take 1 tablet by mouth once daily 90 tablet 3    metFORMIN (GLUCOPHAGE) 500 MG tablet TAKE 2 TABLETS BY MOUTH ONCE DAILY WITH BREAKFAST 180 tablet 3    rivaroxaban (XARELTO) 20 MG TABS tablet Take 1 tablet by mouth daily (with breakfast) 90 tablet 1    tadalafil (CIALIS) 5 MG tablet Take 1 tablet by mouth daily as needed for Erectile Dysfunction 90 tablet 1    hydroxychloroquine (PLAQUENIL) 200 MG tablet TAKE 2 TABLETS BY MOUTH ONCE DAILY      sildenafil (VIAGRA) 50 MG tablet TAKE 1 TABLET BY MOUTH AS NEEDED FOR  ERECTILE DYSFUNCTION 30 tablet 1    therapeutic multivitamin-minerals (THERAGRAN-M) tablet Take 1 tablet by mouth daily. No current facility-administered medications for this visit.          Family History   Problem Relation Age of Onset    High Blood Pressure Mother     Diabetes Mother     Cataracts Mother     Cancer Father     Stroke Father     Other Brother         HEPATITIS C    Diabetes Sister     Diabetes Brother     Glaucoma Neg Hx      Social History     Socioeconomic History    Marital status:      Spouse name: None    Number of children: None    Years of education: None    Highest education level: None   Occupational History    None   Tobacco Use    Smoking status: Former Smoker     Packs/day: 1.50     Years: 9.00     Pack years: 13.50     Types: Cigarettes     Quit date: 1992     Years since quittin.2    Smokeless tobacco: Former User     Quit date: 1990    Tobacco comment: Hiren Martinez RRT 19   Substance and Sexual Activity    Alcohol use: No     Alcohol/week: 0.0 standard drinks     Comment: HISTORY OF ALCOHOL ABUSE 30 YEARS AGO quit     Drug use: No     Types: IV     Comment: HISTORY OF IV DRUG USE 30 YEARS AGO    Sexual activity: None   Other Topics Concern    None   Social History Narrative    None     Social Determinants of Health     Financial Resource Strain: Low Risk     Difficulty of Paying Living Expenses: Not very hard   Food Insecurity: No Food Insecurity    Worried About Running Out of Food in the Last Year: Never true    Jonah of Food in the Last Year: Never true   Transportation Needs:     Lack of Transportation (Medical): Not on file    Lack of Transportation (Non-Medical):  Not on file   Physical Activity:     Days of Exercise per Week: Not on file    Minutes of Exercise per Session: Not on file   Stress:     Feeling of Stress : Not on file   Social Connections:     Frequency of Communication with Friends and Family: Not on file    Frequency of Social Gatherings with Friends and Family: Not on file    Attends Spiritism Services: Not on file    Active Member of Clubs or Organizations: Not on file    Attends Club or Organization Meetings: Not on file    Marital Status: Not on file   Intimate Partner Violence:     Fear of Current or Ex-Partner: Not on file    Emotionally Abused: Not on file    Physically Abused: Not on file    Sexually Abused: Not on file   Housing Stability:     Unable to Pay for Housing in the Last Year: Not on file    Number of Places Lived in the Last Year: Not on file    Unstable Housing in the Last Year: Not on file     Past Medical History:   Diagnosis Date    Cervical disc disease     Depression     Diabetes mellitus ([facility]     PREDIABETIC    Erectile dysfunction     Hepatitis C     tx 6mon completed  Dr Jason Carmichael History of gastroesophageal reflux (GERD)     Hypertension     Hypotestosteronism     Impaired fasting glucose     Laceration 2017    TOP OF HEAD    Neck pain     Obesity     Sleep apnea     CPAP MACHINE    Substance abuse in remission (HCC)     IV DRUG USER 30 YEARS AGO    Substance abuse in remission ([facility]     ALCOHOLIC 20 YEARS    Testicular hypofunction     Wears glasses        ROS     Negative for: Constitutional, Gastrointestinal, Neurological, Skin, Genitourinary, Musculoskeletal, HENT, Endocrine, Cardiovascular, Eyes, Respiratory, Psychiatric, Allergic/Imm, Heme/Lymph          Main Ophthalmology Exam     External Exam       Right Left    External Normal Normal          Slit Lamp Exam       Right Left    Lids/Lashes Normal Normal    Conjunctiva/Sclera White and quiet White and quiet    Cornea Clear Clear    Anterior Chamber Deep and quiet Deep and quiet    Iris Round and reactive Round and reactive    Lens Trace Nuclear sclerosis 1+ Nuclear sclerosis, Posterior subcapsular cataract    Vitreous Normal Normal          Fundus Exam       Right Left    Disc Normal Normal    C/D Ratio 0.25 0.25    Macula Normal; one drusen like nasal to the macula  difficult view     Vessels Normal Normal    Periphery Normal Normal             <div id=\"MAIN_EXAM_REVIEWED\"></div>      Tonometry     Tonometry (Non-contact air puff, 3:07 PM)       Right Left    Pressure 19 22   IOP.3              18.5  CH:  9.3          7.2  WS: 9.3          4.7                 Not recorded       Not recorded Visual Acuity (Snellen - Linear)       Right Left    Dist cc 20/25 -1 20/70    Correction: Glasses          Not recorded       Not recorded       Not recorded           Ophthalmology Exam     Wearing Rx       Sphere Cylinder Axis Add    Right -0.75 -1.75 103 +2.25    Left -0.75 -2.00 062 +2.25    Age: 6m    Type: PAL              Manifest Refraction     Manifest Refraction #2 (Auto)       Sphere Cylinder Axis    Right -0.50 -2.00 100    Left -0.50 -1.75 062                     Orders Placed This Encounter   Procedures    Color Fundus Photography-OU-Both Eyes         IMPRESSION:  1. Diabetes mellitus type 2, noninsulin dependent ([address]    2. Blurred vision, bilateral    3. Combined forms of age-related cataract of both eyes    4. Posterior vitreous detachment of both eyes        PLAN:    Discussed the patient's diagnosis of diabetes and the impact this can have on their ocular health, potentially even leading to permanent blindness. I discussed with the patient the importance of continued follow-up and management with their primary care physician to control their glycemic, blood pressure, and lipid levels. The patient verbalized understanding. 2. No new rx is helpful to improve the vision in the left eye  3. The cataract is obscuring vison worse than it looks upon examination;; OCT shows mild ERM and does not seem responsible for great decrease in vision        Patient Instructions   Cataract evaluation      Return for referral for cataract evaluation left eye more than right .

## 2021-11-27 ASSESSMENT — ENCOUNTER SYMPTOMS
VOMITING: 0
SHORTNESS OF BREATH: 0
BLURRED VISION: 0
WHEEZING: 0
NAUSEA: 0
COUGH: 0
DIARRHEA: 0

## 2021-11-27 NOTE — PROGRESS NOTES
EDENILSON Christensen [address]  [address] [facility], [id number]  [phone]      HPI:   Pt presents to the clinic for a 6 month follow up of chronic medical conditions. He has a history of IFG. He is on metformin. He is due for labs. He has a history of DVT and is on xarelto. He continues to follow with rheumatology for lupus. He feels that the medication is working to help his joint pain. He has a history of hypotestosteronism. He is on testosterone gel. Which is helping. He is also taking cialis daily as needed. This works well to help him achieve an erection. He is morbidly obese. He does not exercise or watch his diet. He has significant depression. He feels that his moods are stable. He denies thoughts of suicide or homicide. He is due for a colonoscopy and he would like to have them exam his hemorrhoids as well. He has no further concerns. Hypertension  This is a chronic problem. The current episode started more than 1 year ago. The problem is unchanged. The problem is controlled. Pertinent negatives include no anxiety, blurred vision, chest pain, headaches, malaise/fatigue, palpitations, peripheral edema or shortness of breath. There are no associated agents to hypertension. Risk factors for coronary artery disease include dyslipidemia, male gender and obesity. Past treatments include ACE inhibitors and diuretics. The current treatment provides moderate improvement. Compliance problems include diet and exercise. Hyperlipidemia  This is a chronic problem. The current episode started more than 1 year ago. Lipid results: due for labs. Exacerbating diseases include obesity. Factors aggravating his hyperlipidemia include fatty foods and thiazides. Pertinent negatives include no chest pain, leg pain, myalgias or shortness of breath. Current antihyperlipidemic treatment includes statins. The current treatment provides moderate improvement of lipids.  Compliance problems include adherence to diet and adherence to exercise. Risk factors for coronary artery disease include dyslipidemia, hypertension, male sex and obesity. Current Outpatient Medications   Medication Sig Dispense Refill    atorvastatin (LIPITOR) 20 MG tablet Take 1 tablet by mouth daily 90 tablet 1    Testosterone 1.62 % GEL APPLY 4 PUMP ACTUATIONS TOPICALLY ONCE DAILY 150 g 2    buPROPion (WELLBUTRIN XL) 150 MG extended release tablet TAKE 1 TABLET BY MOUTH ONCE DAILY IN THE MORNING 30 tablet 2    lisinopril-hydroCHLOROthiazide (PRINZIDE;ZESTORETIC) 20-25 MG per tablet Take 1 tablet by mouth once daily 90 tablet 3    metFORMIN (GLUCOPHAGE) 500 MG tablet TAKE 2 TABLETS BY MOUTH ONCE DAILY WITH BREAKFAST 180 tablet 3    rivaroxaban (XARELTO) 20 MG TABS tablet Take 1 tablet by mouth daily (with breakfast) 90 tablet 1    tadalafil (CIALIS) 5 MG tablet Take 1 tablet by mouth daily as needed for Erectile Dysfunction 90 tablet 1    hydroxychloroquine (PLAQUENIL) 200 MG tablet TAKE 2 TABLETS BY MOUTH ONCE DAILY      sildenafil (VIAGRA) 50 MG tablet TAKE 1 TABLET BY MOUTH AS NEEDED FOR  ERECTILE DYSFUNCTION 30 tablet 1    therapeutic multivitamin-minerals (THERAGRAN-M) tablet Take 1 tablet by mouth daily. No current facility-administered medications for this visit. Allergies   Allergen Reactions    Gabapentin      GI side effects    Seasonal Other (See Comments)     RUNNY NOSE, WATERY EYES       All patients pastmedical, surgical, social and family history has been reviewed. Subjective:      Review of Systems   Constitutional: Negative for activity change, appetite change, fatigue, fever and malaise/fatigue. Eyes: Negative for blurred vision. Respiratory: Negative for cough, shortness of breath and wheezing. Cardiovascular: Negative for chest pain and palpitations. Gastrointestinal: Negative for diarrhea, nausea and vomiting. Musculoskeletal: Positive for arthralgias. Negative for myalgias.    Skin: Negative. Neurological: Negative for dizziness, light-headedness and headaches. Psychiatric/Behavioral: Negative for self-injury, sleep disturbance and suicidal ideas. The patient is not nervous/anxious. Objective:      Physical Exam  Vitals and nursing note reviewed. Constitutional:       Appearance: Normal appearance. He is obese. HENT:      Head: Normocephalic and atraumatic. Cardiovascular:      Rate and Rhythm: Normal rate and regular rhythm. Heart sounds: Normal heart sounds. Pulmonary:      Effort: Pulmonary effort is normal.      Breath sounds: Normal breath sounds. Skin:     General: Skin is warm. Capillary Refill: Capillary refill takes less than 2 seconds. Neurological:      General: No focal deficit present. Mental Status: He is alert and oriented to person, place, and time. Psychiatric:         Mood and Affect: Mood normal.         Behavior: Behavior normal.         Thought Content: Thought content normal.         Judgment: Judgment normal.          Assessment:       Diagnosis Orders   1. Mixed hyperlipidemia  atorvastatin (LIPITOR) 20 MG tablet   2. Impaired fasting glucose     3. Deep venous thrombosis of right profunda femoris vein ([facility]     4. Chronic anticoagulation     5. Hypotestosteronism     6. Morbid obesity ([address]     7. Depression, unspecified depression type     8. Hemorrhoids, unspecified hemorrhoid type  [facility] [place]   9. Encounter for screening colonoscopy  [facility] [place]   10. Flu vaccine need  INFLUENZA, QUADV, 3 YRS AND OLDER, IM PF, PREFILL SYR OR SDV, 0.5ML (AFLURIA QUADV, PF)       Plan:      1.mixed hyperlipidemia-due for labs continue current medications  2. IFG-continue medication as ordered. 3. DVT-continue xarelto  4. Chronic anticoagulation  5. hypotestosteronism-due for labs. Continue current therapy.   6. Morbid obesity-encouraged healthy diet and daily exercise  7. Depression-stable continue current medication  8. Hemorrhoids-will refer to general surgery  9. Screening colonoscopy-referral to general surgery  10. Flu vaccine given in office  Pt to return in 6 months for follow up   Pt to return PRN   Return in about 6 months (around 5/23/2022), or if symptoms worsen or fail to improve. Orders Placed This Encounter   Procedures    INFLUENZA, QUADV, 3 YRS AND OLDER, IM PF, PREFILL SYR OR SDV, 0.5ML (TYE Murphy)   [facility], [place]     Referral Priority:   Routine     Referral Type:   Eval and Treat     Referral Reason:   Specialty Services Required     Requested Specialty:   General Surgery     Number of Visits Requested:   1     Orders Placed This Encounter   Medications    atorvastatin (LIPITOR) 20 MG tablet     Sig: Take 1 tablet by mouth daily     Dispense:  90 tablet     Refill:  1       Patient given educational materials - see patient instructions. All patient questionsanswered. Pt voiced understanding. Reviewed health maintenance.      Electronically signed by SHAKIRA Mahajan - CNP, CNP on 11/27/2021 at 4:39 PM

## 2021-11-30 ENCOUNTER — TELEPHONE (OUTPATIENT)
Dept: SURGERY | Age: 61
End: 2021-11-30

## 2021-11-30 NOTE — TELEPHONE ENCOUNTER
Patient no showed appointment with Dr. Richard Ware today at 2:30 pm. Writer called patient and reschedule appointment to 12/7/2021 at 3:30 pm.

## 2021-12-07 ENCOUNTER — INITIAL CONSULT (OUTPATIENT)
Dept: SURGERY | Age: 61
End: 2021-12-07
Payer: COMMERCIAL

## 2021-12-07 ENCOUNTER — TELEPHONE (OUTPATIENT)
Dept: SURGERY | Age: 61
End: 2021-12-07

## 2021-12-07 VITALS
HEIGHT: 71 IN | SYSTOLIC BLOOD PRESSURE: 133 MMHG | OXYGEN SATURATION: 96 % | HEART RATE: 91 BPM | TEMPERATURE: 98.6 F | BODY MASS INDEX: 38.92 KG/M2 | DIASTOLIC BLOOD PRESSURE: 80 MMHG | WEIGHT: 278 LBS

## 2021-12-07 DIAGNOSIS — K62.5 RECTAL BLEEDING: Primary | ICD-10-CM

## 2021-12-07 DIAGNOSIS — Z86.010 HISTORY OF COLON POLYPS: ICD-10-CM

## 2021-12-07 DIAGNOSIS — K64.9 HEMORRHOIDS, UNSPECIFIED HEMORRHOID TYPE: ICD-10-CM

## 2021-12-07 PROBLEM — D68.61 ANTIPHOSPHOLIPID SYNDROME (HCC): Status: ACTIVE | Noted: 2021-12-07

## 2021-12-07 PROBLEM — Z86.0100 HISTORY OF COLON POLYPS: Status: ACTIVE | Noted: 2021-12-07

## 2021-12-07 PROCEDURE — 99203 OFFICE O/P NEW LOW 30 MIN: CPT | Performed by: SURGERY

## 2021-12-07 RX ORDER — POLYETHYLENE GLYCOL 3350, SODIUM SULFATE ANHYDROUS, SODIUM BICARBONATE, SODIUM CHLORIDE, POTASSIUM CHLORIDE 236; 22.74; 6.74; 5.86; 2.97 G/4L; G/4L; G/4L; G/4L; G/4L
4 POWDER, FOR SOLUTION ORAL ONCE
Qty: 4000 ML | Refills: 0 | Status: SHIPPED | OUTPATIENT
Start: 2021-12-07 | End: 2021-12-07

## 2021-12-07 RX ORDER — BISACODYL 5 MG
TABLET, DELAYED RELEASE (ENTERIC COATED) ORAL
Qty: 2 TABLET | Refills: 0 | Status: SHIPPED | OUTPATIENT
Start: 2021-12-07 | End: 2022-05-06

## 2021-12-07 NOTE — PROGRESS NOTES
Name:  Erica Ly  Age:  63 y.o.   :  1960    Physician: Grupo Arndt MD       Chief Complaint: Rectal Bleeding, Hemorhoids      HPI:  Colonoscopy in  showed 2 adenomas. Has known hemorrhoids. Feels them prolapse out, uncomfortable and fecal leakage. No abdominal pain. No change in bowel habit. Occasional rectal bleeding. Takes Metamucil daily. MEDICAL HISTORY:    Past Medical History:        Diagnosis Date    Antiphospholipid syndrome ([address]     Cervical disc disease     Depression     Diabetes mellitus ([address]     PREDIABETIC    Erectile dysfunction     Hepatitis C     tx 6mon completed  Dr Martin Quispe TREATMENT    History of gastroesophageal reflux (GERD)     Hypertension     Hypotestosteronism     Impaired fasting glucose     Laceration 2017    TOP OF HEAD    Neck pain     Obesity     Sleep apnea     CPAP MACHINE    Substance abuse in remission ([address]     IV DRUG USER 30 YEARS AGO    Substance abuse in remission ([address]     ALCOHOLIC 20 YEARS    Testicular hypofunction     Wears glasses        Past Surgical History:        Procedure Laterality Date    CERVICAL FUSION  2017    C5-7    CERVICAL FUSION N/A 2017     ANTERIOR CERVICAL DECOMPRESSION FUSION C5-C7 performed by Nacho Lowe MD at [facility] COLONOSCOPY  2014    polyps    COLONOSCOPY  2015    sigmoid diverticulosis    HAMMER TOE SURGERY Left     2ND  AND 3RD TOE  [facility]    LIVER BIOPSY      TYMPANIC MEMBRANE REPAIR      MULTIPLE AS A CHILD       Prior to Admission medications    Medication Sig Start Date End Date Taking?  Authorizing Provider   atorvastatin (LIPITOR) 20 MG tablet Take 1 tablet by mouth daily 21  Yes Awa Viveros APRN - CNP   Testosterone 1.62 % GEL APPLY 4 PUMP ACTUATIONS TOPICALLY ONCE DAILY 21  Yes SHAKIRA Smyth - DONTRELL   buPROPion (WELLBUTRIN XL) 150 MG extended release tablet TAKE 1 TABLET pain  Gastrointestinal: negative  : negative  Neurological: negative  Musculoskeletal: Diffuse pain and sore, which is now well controlled. PHYSICAL EXAM:    /80 (Site: Left Upper Arm, Position: Sitting, Cuff Size: Large Adult)   Pulse 91   Temp 98.6 °F (37 °C) (Tympanic)   Ht 5' 11\" (1.803 m)   Wt 278 lb (126.1 kg)   SpO2 96%   BMI 38.77 kg/m²       Gen: Alert and oriented x3, no acute distress, well-appearing    Eyes: PERRL, Sclera Anicteric    Head: Normocephalic, non tender     Neck: Supple, no significant adenopathy. No carotid bruits, thyroid normal size and no masses    Chest: CTA, no wheezes, no rales, no rhonchi, symmetrical    Heart: Normal rate, regular rhythm, no murmurs    Abdomen: Soft, positive bowel sounds, non tender, non distended, no masses, moderate size umbilical hernia reducible, no HSM, no bruits. Neuro: Normal speech, motor/sensory grossly normal bilateral    MSK: No joint tenderness, deformity, or swelling           ASSESSMENT:  1) Rectal Bleeding  2) Known hemorrhoids  3) 2 adenomas 2014  4) Chronically anticoagulated - history of PE and hypercoagulable state with antiphospholipid syndrome. PLAN:  1) Colonoscopy - Risks and benefits of colonoscopy were discussed with Flaco Mendoza. In particular I discussed the possibility of incomplete colonoscopy and failure to make a diagnosis. I also discussed the risks and consequences of reactions to the sedatives, bleeding and perforation. Alternate ways of evaluating the colon including barium enema, CT colonography and sigmoidoscopy were discussed. he was also given the opportunity to have any questions answered, and encouraged to call the office with additional issues. 2) Flaco Mendoza has symptomatic hemorrhoids as discussed. Treatment options were reviewed in detail including:    Continuing medical therapy: Fiber, water, stool softeners, topical medications, ice pack an sitz baths.     Infrared Coagulation: Works better on smaller internal hemorrhoid. Risk of recurrence and bleeding    Rubber Banding: Useable for internal hemorrhoids. Risk of recurrence, bleeding and pain    Hemorrhoidectomy: Addresses both internal and external component. Lower risk of recurrence. Risk of significant post procedure pain, severe infections, anal stenosis. 3) Discussed observation vs repair umbilical hernia.     Electronically signed by Siri Yeh MD on 12/7/2021 at 4:46 PM

## 2021-12-07 NOTE — PATIENT INSTRUCTIONS
drink.  · Use a stool softener that contains bran or psyllium. You can save money by buying bran or psyllium (available in bulk at most health food stores) and sprinkling it on foods or stirring it into fruit juice. Or you can use a product such as Metamucil or Hydrocil. · Practice healthy bowel habits. ? Go to the bathroom as soon as you have the urge. ? Avoid straining to pass stools. Relax and give yourself time to let things happen naturally. ? Do not hold your breath while passing stools. ? Do not read while sitting on the toilet. Get off the toilet as soon as you have finished. · Take your medicines exactly as prescribed. Call your doctor if you think you are having a problem with your medicine. When should you call for help? Call 911 anytime you think you may need emergency care. For example, call if:    · You pass maroon or very bloody stools. Call your doctor now or seek immediate medical care if:    · You have increased pain.     · You have increased bleeding. Watch closely for changes in your health, and be sure to contact your doctor if:    · Your symptoms have not improved after 3 or 4 days. Where can you learn more? Go to https://Buzz[employer].StatSheet. org and sign in to your [employer] account. Enter Z689 in the [facility] box to learn more about \"Hemorrhoids: Care Instructions. \"     If you do not have an account, please click on the \"Sign Up Now\" link. Current as of: February 10, 2021               Content Version: 13.0  © 2006-2021 [employer]. Care instructions adapted under license by [facility] ([facility]). If you have questions about a medical condition or this instruction, always ask your healthcare professional. [place] [address] any warranty or liability for your use of this information.

## 2021-12-21 PROBLEM — K62.5 RECTAL BLEEDING: Status: ACTIVE | Noted: 2021-12-21

## 2021-12-21 NOTE — H&P
Name:  David Gomez  Age:  63 y.o.   :  1960    Physician: Salo Jovel MD       Chief Complaint: Rectal Bleeding, Hemorhoids      HPI:  Colonoscopy in  showed 2 adenomas. Has known hemorrhoids. Feels them prolapse out, uncomfortable and fecal leakage. No abdominal pain. No change in bowel habit. Occasional rectal bleeding. Takes Metamucil daily. MEDICAL HISTORY:    Past Medical History:        Diagnosis Date    Antiphospholipid syndrome ([address]     Cervical disc disease     Depression     Diabetes mellitus ([address]     PREDIABETIC    Erectile dysfunction     Hepatitis C     tx 6mon completed  Dr Rodriguez Ranks TREATMENT    History of gastroesophageal reflux (GERD)     Hypertension     Hypotestosteronism     Impaired fasting glucose     Laceration 2017    TOP OF HEAD    Neck pain     Obesity     Sleep apnea     CPAP MACHINE    Substance abuse in remission ([address]     IV DRUG USER 30 YEARS AGO    Substance abuse in remission ([address]     ALCOHOLIC 20 YEARS    Testicular hypofunction     Wears glasses        Past Surgical History:        Procedure Laterality Date    CERVICAL FUSION  2017    C5-7    CERVICAL FUSION N/A 2017     ANTERIOR CERVICAL DECOMPRESSION FUSION C5-C7 performed by Burt Rinne, MD at [address] [place] [address] COLONOSCOPY  2014    polyps    COLONOSCOPY  2015    sigmoid diverticulosis    HAMMER TOE SURGERY Left     2ND  AND 3RD TOE  [facility] 12    LIVER BIOPSY      TYMPANIC MEMBRANE REPAIR      MULTIPLE AS A CHILD       Prior to Admission medications    Medication Sig Start Date End Date Taking? Authorizing Provider   bisacodyl (BISACODYL) 5 MG EC tablet Take two 5mg tablets at 12pm noon the day before procedure.  21   Alcides Marie MD   atorvastatin (LIPITOR) 20 MG tablet Take 1 tablet by mouth daily 21   SHAKIRA Dodd - CNP   Testosterone 1.62 % GEL APPLY 4 PUMP ACTUATIONS TOPICALLY ONCE DAILY 11/19/21   [facility], APRN - CNP   buPROPion (WELLBUTRIN XL) 150 MG extended release tablet TAKE 1 TABLET BY MOUTH ONCE DAILY IN THE MORNING 10/6/21   [facility], APRN - CNP   lisinopril-hydroCHLOROthiazide (PRINZIDE;ZESTORETIC) 20-25 MG per tablet Take 1 tablet by mouth once daily 8/23/21   [facility], APRN - CNP   metFORMIN (GLUCOPHAGE) 500 MG tablet TAKE 2 TABLETS BY MOUTH ONCE DAILY WITH BREAKFAST 8/23/21   [facility], APRN - CNP   rivaroxaban (XARELTO) 20 MG TABS tablet Take 1 tablet by mouth daily (with breakfast) 7/9/21   [facility], APRN - CNP   tadalafil (CIALIS) 5 MG tablet Take 1 tablet by mouth daily as needed for Erectile Dysfunction 5/11/21   [facility], APRN - CNP   hydroxychloroquine (PLAQUENIL) 200 MG tablet TAKE 2 TABLETS BY MOUTH ONCE DAILY 4/6/21   Historical Provider, MD   sildenafil (VIAGRA) 50 MG tablet TAKE 1 TABLET BY MOUTH AS NEEDED FOR  ERECTILE DYSFUNCTION 1/27/21   [facility], APRN - CNP   therapeutic multivitamin-minerals [facility]) tablet Take 1 tablet by mouth daily. Historical Provider, MD       Allergies   Allergen Reactions    Gabapentin      GI side effects    Seasonal Other (See Comments)     RUNNY NOSE, WATERY EYES        reports that he quit smoking about 29 years ago. His smoking use included cigarettes. He has a 13.50 pack-year smoking history. He quit smokeless tobacco use about 31 years ago. reports no history of alcohol use. Family History   Problem Relation Age of Onset    High Blood Pressure Mother     Diabetes Mother     Cataracts Mother     Cancer Father         Unknown primary.   May have been colon cancer    Stroke Father     Diabetes Sister     Other Brother         HEPATITIS C    Diabetes Brother     Glaucoma Neg Hx             REVIEW OF SYSTEMS:  General:  negative  Eye:  negative   ENT:negative  Allergy/Immunology: negative  Hematology/Lymphatic: negative  Lungs: No sob, wheezing, cough choking  Cardiovascular: No chest pain  Gastrointestinal: negative  : negative  Neurological: negative  Musculoskeletal: Diffuse pain and sore, which is now well controlled. PHYSICAL EXAM:    There were no vitals taken for this visit. Gen: Alert and oriented x3, no acute distress, well-appearing    Eyes: PERRL, Sclera Anicteric    Head: Normocephalic, non tender     Neck: Supple, no significant adenopathy. No carotid bruits, thyroid normal size and no masses    Chest: CTA, no wheezes, no rales, no rhonchi, symmetrical    Heart: Normal rate, regular rhythm, no murmurs    Abdomen: Soft, positive bowel sounds, non tender, non distended, no masses, moderate size umbilical hernia reducible, no HSM, no bruits. Neuro: Normal speech, motor/sensory grossly normal bilateral    MSK: No joint tenderness, deformity, or swelling           ASSESSMENT:  1) Rectal Bleeding  2) Known hemorrhoids  3) 2 adenomas 2014  4) Chronically anticoagulated - history of PE and hypercoagulable state with antiphospholipid syndrome. PLAN:  1) Colonoscopy - Risks and benefits of colonoscopy were discussed with Richard Munguia. In particular I discussed the possibility of incomplete colonoscopy and failure to make a diagnosis. I also discussed the risks and consequences of reactions to the sedatives, bleeding and perforation. Alternate ways of evaluating the colon including barium enema, CT colonography and sigmoidoscopy were discussed. he was also given the opportunity to have any questions answered, and encouraged to call the office with additional issues. 2) Richard Munguia has symptomatic hemorrhoids as discussed. Treatment options were reviewed in detail including:    Continuing medical therapy: Fiber, water, stool softeners, topical medications, ice pack an sitz baths. Infrared Coagulation: Works better on smaller internal hemorrhoid.   Risk of recurrence and bleeding    Rubber Banding: Useable for internal hemorrhoids. Risk of recurrence, bleeding and pain    Hemorrhoidectomy: Addresses both internal and external component. Lower risk of recurrence. Risk of significant post procedure pain, severe infections, anal stenosis. 3) Discussed observation vs repair umbilical hernia.     Electronically signed by Ji Harris MD on 12/21/2021 at 11:50 AM

## 2021-12-22 ENCOUNTER — ANESTHESIA EVENT (OUTPATIENT)
Dept: OPERATING ROOM | Age: 61
End: 2021-12-22
Payer: COMMERCIAL

## 2021-12-22 ENCOUNTER — ANESTHESIA (OUTPATIENT)
Dept: OPERATING ROOM | Age: 61
End: 2021-12-22
Payer: COMMERCIAL

## 2021-12-22 ENCOUNTER — HOSPITAL ENCOUNTER (OUTPATIENT)
Age: 61
Setting detail: OUTPATIENT SURGERY
Discharge: HOME | End: 2021-12-22
Attending: SURGERY | Admitting: SURGERY
Payer: COMMERCIAL

## 2021-12-22 VITALS
RESPIRATION RATE: 16 BRPM | DIASTOLIC BLOOD PRESSURE: 90 MMHG | BODY MASS INDEX: 37.38 KG/M2 | OXYGEN SATURATION: 97 % | SYSTOLIC BLOOD PRESSURE: 169 MMHG | HEART RATE: 78 BPM | HEIGHT: 71 IN | WEIGHT: 267 LBS

## 2021-12-22 VITALS — TEMPERATURE: 98.2 F | SYSTOLIC BLOOD PRESSURE: 148 MMHG | OXYGEN SATURATION: 95 % | DIASTOLIC BLOOD PRESSURE: 89 MMHG

## 2021-12-22 LAB — GLUCOSE BLD-MCNC: 122 MG/DL (ref 75–110)

## 2021-12-22 PROCEDURE — 82947 ASSAY GLUCOSE BLOOD QUANT: CPT

## 2021-12-22 PROCEDURE — 6360000002 HC RX W HCPCS: Performed by: NURSE ANESTHETIST, CERTIFIED REGISTERED

## 2021-12-22 PROCEDURE — 7100000011 HC PHASE II RECOVERY - ADDTL 15 MIN: Performed by: SURGERY

## 2021-12-22 PROCEDURE — 3609010300 HC COLONOSCOPY W/BIOPSY SINGLE/MULTIPLE: Performed by: SURGERY

## 2021-12-22 PROCEDURE — 45380 COLONOSCOPY AND BIOPSY: CPT | Performed by: SURGERY

## 2021-12-22 PROCEDURE — 7100000010 HC PHASE II RECOVERY - FIRST 15 MIN: Performed by: SURGERY

## 2021-12-22 PROCEDURE — 3700000001 HC ADD 15 MINUTES (ANESTHESIA): Performed by: SURGERY

## 2021-12-22 PROCEDURE — 88305 TISSUE EXAM BY PATHOLOGIST: CPT

## 2021-12-22 PROCEDURE — 2709999900 HC NON-CHARGEABLE SUPPLY: Performed by: SURGERY

## 2021-12-22 PROCEDURE — 2580000003 HC RX 258: Performed by: SURGERY

## 2021-12-22 PROCEDURE — 3700000000 HC ANESTHESIA ATTENDED CARE: Performed by: SURGERY

## 2021-12-22 RX ORDER — PROPOFOL 10 MG/ML
INJECTION, EMULSION INTRAVENOUS PRN
Status: DISCONTINUED | OUTPATIENT
Start: 2021-12-22 | End: 2021-12-22 | Stop reason: SDUPTHER

## 2021-12-22 RX ORDER — SODIUM CHLORIDE, SODIUM LACTATE, POTASSIUM CHLORIDE, CALCIUM CHLORIDE 600; 310; 30; 20 MG/100ML; MG/100ML; MG/100ML; MG/100ML
INJECTION, SOLUTION INTRAVENOUS CONTINUOUS
Status: DISCONTINUED | OUTPATIENT
Start: 2021-12-22 | End: 2021-12-22 | Stop reason: HOSPADM

## 2021-12-22 RX ADMIN — SODIUM CHLORIDE, POTASSIUM CHLORIDE, SODIUM LACTATE AND CALCIUM CHLORIDE: 600; 310; 30; 20 INJECTION, SOLUTION INTRAVENOUS at 08:34

## 2021-12-22 RX ADMIN — SODIUM CHLORIDE, POTASSIUM CHLORIDE, SODIUM LACTATE AND CALCIUM CHLORIDE: 600; 310; 30; 20 INJECTION, SOLUTION INTRAVENOUS at 08:29

## 2021-12-22 RX ADMIN — PROPOFOL 600 MG: 10 INJECTION, EMULSION INTRAVENOUS at 08:46

## 2021-12-22 ASSESSMENT — PAIN SCALES - GENERAL
PAINLEVEL_OUTOF10: 0

## 2021-12-22 ASSESSMENT — PAIN - FUNCTIONAL ASSESSMENT: PAIN_FUNCTIONAL_ASSESSMENT: 0-10

## 2021-12-22 NOTE — OP NOTE
Operative Note      Patient: Erica Ly  YOB: 1960  MRN: [id number]    Date of Procedure: 12/22/2021    Pre-Op Diagnosis: rectal bleed, hemorrhoids    Post-Op Diagnosis: Same and Extensive sigmoid diverticulosis, polyps x 6       Procedure(s):  COLONOSCOPY WITH BIOPSYX6    Surgeon(s):  Lucie Hill MD    Assistant:   * No surgical staff found *    Anesthesia: General    Estimated Blood Loss (mL): 10    Complications: None    Specimens:   ID Type Source Tests Collected by Time Destination   A : Ascending Colon Polyp Tissue Colon SURGICAL PATHOLOGY Lucie Hill MD 12/22/2021 0901    B : Hepatic Flexure Polyp Tissue Colon SURGICAL PATHOLOGY Lucie Hill MD 12/22/2021 3344    C : Hepatic Flexure Polyp #2 Tissue Colon SURGICAL PATHOLOGY Lucie Hill MD 12/22/2021 0909    D : Sigmoid Colon Polyp Tissue Colon SURGICAL PATHOLOGY Lucie Hill MD 12/22/2021 6299    E : Distal Sigmoid Colon Polyp Tissue Colon SURGICAL PATHOLOGY Lucie Hill MD 12/22/2021 0919        Implants:  * No implants in log *      Drains:          Patient was brought to the endoscopy area and IV sedation was induced by the CRNA. Subsequently his rectum passed proximally. His bowel prep was good to fair. He had quite a bit of retained pasty to heavily particulate liquid stool although majority this could be irrigated and suctioned way but he did make visualization a little more difficult in some places. Is extensive diverticulosis affecting most of his left colon. He has multiple large diverticula and probably some associated haustral hypertrophy. Scope was passed around into the cecum. He has a very long colon I did use the entire length of the scope get the scope into the cecum. I was able to visualize both the appendiceal orifice and the ileocecal valve. From there the scope was slowly and carefully withdrawn. Use narrowband imaging and also the Endo cuff device and the scope.  Into finding multiple polyps on the way out. There was a cluster of polyps around the hepatic flexure and these were fairly sessile and flat. I would say they were a centimeter or more across. The first specimen in the ascending colon actually contains 2 different polyps and then they 2 more similar polyp around hepatic flexure. Each of these was removed with about 10-12 bites the cold biopsy forceps. The smaller polyps are noted in the sigmoid each removed with 1 or 2 bite the cold biopsy forceps. Retroflexing the scope in the rectum he does have some hemorrhoids although they are not as prominent as I thought they might be based upon his description. Certainly not enough to cause some rectal bleeding. Left to see what the biopsy results show and keep in mind the polyps around the hepatic flexure are quite sessile so is difficult to know if he got them out entirely. Additionally he may want to follow-up to discuss hemorrhoid treatment.     Electronically signed by Deana Gardner MD on 12/22/2021 at 9:22 AM

## 2021-12-22 NOTE — ANESTHESIA PRE PROCEDURE
Department of Anesthesiology  Preprocedure Note       Name:  Ulysses Roots   Age:  63 y.o.  :  1960                                          MRN:  [id number]         Date:  2021      Surgeon: Jessie Mohs):  Clara Coker MD    Procedure: Procedure(s):  COLONOSCOPY    Medications prior to admission:   Prior to Admission medications    Medication Sig Start Date End Date Taking? Authorizing Provider   bisacodyl (BISACODYL) 5 MG EC tablet Take two 5mg tablets at 12pm noon the day before procedure. 21  Yes Clara Coker MD   therapeutic multivitamin-minerals [facility]) tablet Take 1 tablet by mouth daily.    Yes Historical Provider, MD   atorvastatin (LIPITOR) 20 MG tablet Take 1 tablet by mouth daily 21   Severo Pinna, APRN - CNP   Testosterone 1.62 % GEL APPLY 4 PUMP ACTUATIONS TOPICALLY ONCE DAILY 21   Severo Pinna, APRN - CNP   buPROPion (WELLBUTRIN XL) 150 MG extended release tablet TAKE 1 TABLET BY MOUTH ONCE DAILY IN THE MORNING 10/6/21   Severo Pinna, APRN - CNP   lisinopril-hydroCHLOROthiazide (PRINZIDE;ZESTORETIC) 20-25 MG per tablet Take 1 tablet by mouth once daily 21   Severo Pinna, APRN - CNP   metFORMIN (GLUCOPHAGE) 500 MG tablet TAKE 2 TABLETS BY MOUTH ONCE DAILY WITH BREAKFAST 21   Severo Pinna, APRN - CNP   rivaroxaban (XARELTO) 20 MG TABS tablet Take 1 tablet by mouth daily (with breakfast) 21   Severo Pinna, APRN - CNP   tadalafil (CIALIS) 5 MG tablet Take 1 tablet by mouth daily as needed for Erectile Dysfunction 21   Severo Pinna, APRN - CNP   hydroxychloroquine (PLAQUENIL) 200 MG tablet TAKE 2 TABLETS BY MOUTH ONCE DAILY 21   Historical Provider, MD   sildenafil (VIAGRA) 50 MG tablet TAKE 1 TABLET BY MOUTH AS NEEDED FOR  ERECTILE DYSFUNCTION 21   Severo Pinna, APRN - CNP       Current medications:    Current Facility-Administered Medications   Medication Dose Route Frequency Provider Last Rate Last Admin    lactated ringers infusion   IntraVENous Continuous Destinee Muniz  mL/hr at 12/22/21 0834 New Bag at 12/22/21 2016       Allergies: Allergies   Allergen Reactions    Gabapentin      GI side effects    Seasonal Other (See Comments)     RUNNY NOSE, WATERY EYES       Problem List:    Patient Active Problem List   Diagnosis Code    Essential hypertension I10    Depression F32. A    Hepatitis C B19.20    Erectile dysfunction N52.9    Morbid obesity (HCC) E66.01    Hypotestosteronism E34.9    Cervical myelopathy (HCC) G95.9    Prediabetes R73.03    Sleep apnea, obstructive G47.33    Acute pulmonary embolism without acute cor pulmonale (HCC) I26.99    Antiphospholipid syndrome (HCC) D68.61    History of colon polyps Z86.010    Hemorrhoids K64.9    Rectal bleeding K62.5       Past Medical History:        Diagnosis Date    Antiphospholipid syndrome (HCC)     Cervical disc disease     Depression     Diabetes mellitus ([address]     PREDIABETIC    Erectile dysfunction     Hepatitis C     tx 6mon completed 8/913 Dr Michael Montanez TREATMENT    History of gastroesophageal reflux (GERD)     Hypertension     Hypotestosteronism     Impaired fasting glucose     Laceration 05/06/2017    TOP OF HEAD    Neck pain     Obesity     Sleep apnea     CPAP MACHINE    Substance abuse in remission ([address]     IV DRUG USER 30 YEARS AGO    Substance abuse in remission ([address]     ALCOHOLIC 20 YEARS    Testicular hypofunction 2015    Wears glasses        Past Surgical History:        Procedure Laterality Date    CERVICAL FUSION  05/09/2017    C5-7    CERVICAL FUSION N/A 5/9/2017     ANTERIOR CERVICAL DECOMPRESSION FUSION C5-C7 performed by Renuka [facility]  06/18/2014    polyps    COLONOSCOPY  9/2015    sigmoid diverticulosis    HAMMER TOE SURGERY Left     2ND  AND 3RD TOE [facility] 12/20/12    LIVER BIOPSY      TYMPANIC MEMBRANE REPAIR      MULTIPLE AS A CHILD       Social History:    Social History     Tobacco Use    Smoking status: Former Smoker     Packs/day: 1.50     Years: 9.00     Pack years: 13.50     Types: Cigarettes     Quit date: 1992     Years since quittin.3    Smokeless tobacco: Former User     Quit date: 1990    Tobacco comment: Primitivo Stevenson RRT 19   Substance Use Topics    Alcohol use: No     Alcohol/week: 0.0 standard drinks     Comment: HISTORY OF ALCOHOL ABUSE 30 YEARS AGO quit                                 Counseling given: Not Answered  Comment: Primitivo Stevenson RRT 19      Vital Signs (Current):   Vitals:    21 0824   BP: (!) 149/82   Pulse: 65   Resp: 16   SpO2: 96%   Weight: 267 lb (121.1 kg)   Height: 5' 11\" (1.803 m)                                              BP Readings from Last 3 Encounters:   21 (!) 149/82   21 133/80   21 132/78       NPO Status: Time of last liquid consumption:                         Time of last solid consumption:                         Date of last liquid consumption: 21                        Date of last solid food consumption: 21    BMI:   Wt Readings from Last 3 Encounters:   21 267 lb (121.1 kg)   21 278 lb (126.1 kg)   21 267 lb (121.1 kg)     Body mass index is 37.24 kg/m².     CBC:   Lab Results   Component Value Date    WBC 5.0 2021    RBC 5.55 2021    HGB 16.3 2021    HCT 48.5 2021    MCV 87.4 2021    RDW 13.4 2021     2021       CMP:   Lab Results   Component Value Date     2021    K 4.4 2021     2021    CO2 26 2021    BUN 19 2021    CREATININE 0.92 2021    GFRAA >60 2021    LABGLOM >60 2021    GLUCOSE 133 2021    PROT 7.4 2021    CALCIUM 9.5 2021    BILITOT 0.45 2021    ALKPHOS 81 2021    AST 21 2021    ALT 25

## 2021-12-22 NOTE — ANESTHESIA POSTPROCEDURE EVALUATION
Department of Anesthesiology  Postprocedure Note    Patient: Nisha Connors  MRN: [id number]  YOB: 1960  Date of evaluation: 12/22/2021  Time:  9:24 AM     Procedure Summary     Date: 12/22/21 Room / Location: [address]    Anesthesia Start: 2987 Anesthesia Stop: 1882    Procedure: COLONOSCOPY WITH BIOPSY (N/A ) Diagnosis: (rectal bleed, hemorrhoids)    Surgeons: Mariella Ortega MD Responsible Provider: SHAKIRA Mcgowan CRNA    Anesthesia Type: general, TIVA ASA Status: 3          Anesthesia Type: general, TIVA    Jayden Phase I: Jayden Score: 10    Jayden Phase II:      Last vitals: Reviewed and per EMR flowsheets.        Anesthesia Post Evaluation    Patient location during evaluation: PACU  Patient participation: complete - patient participated  Level of consciousness: awake  Pain score: 0  Airway patency: patent  Nausea & Vomiting: no nausea and no vomiting  Complications: no  Cardiovascular status: blood pressure returned to baseline and hemodynamically stable  Respiratory status: acceptable, spontaneous ventilation and room air  Hydration status: euvolemic

## 2021-12-23 LAB — SURGICAL PATHOLOGY REPORT: NORMAL

## 2021-12-27 DIAGNOSIS — I82.411 DEEP VENOUS THROMBOSIS OF RIGHT PROFUNDA FEMORIS VEIN (HCC): ICD-10-CM

## 2021-12-27 DIAGNOSIS — I26.99 OTHER ACUTE PULMONARY EMBOLISM WITHOUT ACUTE COR PULMONALE (HCC): ICD-10-CM

## 2021-12-27 DIAGNOSIS — Z86.010 HISTORY OF COLON POLYPS: Primary | ICD-10-CM

## 2021-12-27 DIAGNOSIS — E66.01 MORBID OBESITY (HCC): ICD-10-CM

## 2021-12-27 DIAGNOSIS — Z79.01 CHRONIC ANTICOAGULATION: ICD-10-CM

## 2021-12-27 DIAGNOSIS — E34.9 HYPOTESTOSTERONISM: ICD-10-CM

## 2021-12-27 RX ORDER — RIVAROXABAN 20 MG/1
TABLET, FILM COATED ORAL
Qty: 30 TABLET | Refills: 0 | Status: SHIPPED | OUTPATIENT
Start: 2021-12-27 | End: 2022-01-17

## 2021-12-27 NOTE — TELEPHONE ENCOUNTER
Tereza Carbone called requesting a refill of the below medication which has been pended for you:     Requested Prescriptions     Pending Prescriptions Disp Refills    XARELTO 20 MG TABS tablet [Pharmacy Med Name: Xarelto 20 MG Oral Tablet] 30 tablet 0     Sig: Take 1 tablet by mouth once daily with breakfast       Last Appointment Date: 11/23/2021  Next Appointment Date: 5/24/2022    Allergies   Allergen Reactions    Gabapentin      GI side effects    Seasonal Other (See Comments)     RUNNY NOSE, WATERY EYES

## 2022-01-15 DIAGNOSIS — Z79.01 CHRONIC ANTICOAGULATION: ICD-10-CM

## 2022-01-15 DIAGNOSIS — E34.9 HYPOTESTOSTERONISM: ICD-10-CM

## 2022-01-15 DIAGNOSIS — I26.99 OTHER ACUTE PULMONARY EMBOLISM WITHOUT ACUTE COR PULMONALE (HCC): ICD-10-CM

## 2022-01-15 DIAGNOSIS — I82.411 DEEP VENOUS THROMBOSIS OF RIGHT PROFUNDA FEMORIS VEIN (HCC): ICD-10-CM

## 2022-01-15 DIAGNOSIS — E66.01 MORBID OBESITY (HCC): ICD-10-CM

## 2022-01-17 ENCOUNTER — OFFICE VISIT (OUTPATIENT)
Dept: PRIMARY CARE CLINIC | Age: 62
End: 2022-01-17
Payer: COMMERCIAL

## 2022-01-17 ENCOUNTER — HOSPITAL ENCOUNTER (OUTPATIENT)
Age: 62
Setting detail: SPECIMEN
Discharge: HOME | End: 2022-01-17
Payer: COMMERCIAL

## 2022-01-17 VITALS
TEMPERATURE: 96.7 F | DIASTOLIC BLOOD PRESSURE: 80 MMHG | BODY MASS INDEX: 38.08 KG/M2 | HEART RATE: 84 BPM | SYSTOLIC BLOOD PRESSURE: 138 MMHG | OXYGEN SATURATION: 97 % | WEIGHT: 273 LBS

## 2022-01-17 DIAGNOSIS — R09.81 CONGESTION OF NASAL SINUS: ICD-10-CM

## 2022-01-17 DIAGNOSIS — R50.9 FEVER, UNSPECIFIED FEVER CAUSE: ICD-10-CM

## 2022-01-17 DIAGNOSIS — Z20.822 PERSON UNDER INVESTIGATION FOR COVID-19: ICD-10-CM

## 2022-01-17 DIAGNOSIS — Z20.822 PERSON UNDER INVESTIGATION FOR COVID-19: Primary | ICD-10-CM

## 2022-01-17 DIAGNOSIS — J06.9 VIRAL UPPER RESPIRATORY ILLNESS: ICD-10-CM

## 2022-01-17 DIAGNOSIS — J02.9 ACUTE VIRAL PHARYNGITIS: ICD-10-CM

## 2022-01-17 DIAGNOSIS — R05.9 COUGH: ICD-10-CM

## 2022-01-17 PROCEDURE — U0005 INFEC AGEN DETEC AMPLI PROBE: HCPCS

## 2022-01-17 PROCEDURE — U0003 INFECTIOUS AGENT DETECTION BY NUCLEIC ACID (DNA OR RNA); SEVERE ACUTE RESPIRATORY SYNDROME CORONAVIRUS 2 (SARS-COV-2) (CORONAVIRUS DISEASE [COVID-19]), AMPLIFIED PROBE TECHNIQUE, MAKING USE OF HIGH THROUGHPUT TECHNOLOGIES AS DESCRIBED BY CMS-2020-01-R: HCPCS

## 2022-01-17 PROCEDURE — 99213 OFFICE O/P EST LOW 20 MIN: CPT | Performed by: NURSE PRACTITIONER

## 2022-01-17 RX ORDER — FLUTICASONE PROPIONATE 50 MCG
1 SPRAY, SUSPENSION (ML) NASAL 2 TIMES DAILY
Qty: 16 G | Refills: 0 | Status: SHIPPED | OUTPATIENT
Start: 2022-01-17

## 2022-01-17 RX ORDER — LORATADINE 10 MG/1
10 TABLET ORAL DAILY
Qty: 30 TABLET | Refills: 0 | Status: SHIPPED | OUTPATIENT
Start: 2022-01-17 | End: 2022-02-16

## 2022-01-17 RX ORDER — RIVAROXABAN 20 MG/1
TABLET, FILM COATED ORAL
Qty: 30 TABLET | Refills: 0 | Status: SHIPPED | OUTPATIENT
Start: 2022-01-17 | End: 2022-03-15

## 2022-01-17 ASSESSMENT — ENCOUNTER SYMPTOMS
RHINORRHEA: 1
CONSTIPATION: 0
SORE THROAT: 1
COUGH: 1
DIARRHEA: 0
EYES NEGATIVE: 1
SINUS PAIN: 1
VOMITING: 0
ABDOMINAL PAIN: 0
ALLERGIC/IMMUNOLOGIC NEGATIVE: 1
NAUSEA: 1

## 2022-01-17 ASSESSMENT — PATIENT HEALTH QUESTIONNAIRE - PHQ9
SUM OF ALL RESPONSES TO PHQ QUESTIONS 1-9: 0
2. FEELING DOWN, DEPRESSED OR HOPELESS: 0
1. LITTLE INTEREST OR PLEASURE IN DOING THINGS: 0
SUM OF ALL RESPONSES TO PHQ9 QUESTIONS 1 & 2: 0
SUM OF ALL RESPONSES TO PHQ QUESTIONS 1-9: 0

## 2022-01-17 NOTE — PROGRESS NOTES
[facility] A department of [facility]  [facility]  Phone: [phone]  Fax: [phone]      Asya Rayo is a 63 y.o. male who presents to the [facility] today for his medical conditions/complaints as noted below. Asya Rayo is c/o of URI (exposed to covid )          HPI:     Daughter tested positive Friday for Saul. Patient started runny nose and sore throat Saturday and developed body aches and headache yesterday      URI   This is a new problem. The current episode started in the past 7 days (1/15/2022). The problem has been gradually worsening. Maximum temperature: Felt feverish. No thermometer at home. The fever has been present for less than 1 day. Associated symptoms include congestion, coughing, headaches, nausea, a plugged ear sensation, rhinorrhea, sinus pain and a sore throat. Pertinent negatives include no abdominal pain, chest pain, diarrhea, dysuria (Saturday and Sunday 1-2 times each day), rash or vomiting. Treatments tried: Nyquil. The treatment provided mild relief.        Past Medical History:   Diagnosis Date    Antiphospholipid syndrome ([facility] [address]     Cervical disc disease     Depression     Diabetes mellitus ([facility] [address]     PREDIABETIC    Erectile dysfunction     Hepatitis C     tx 6mon completed 8/913 Dr Karie Dhaliwal TREATMENT    History of gastroesophageal reflux (GERD)     Hypertension     Hypotestosteronism     Impaired fasting glucose     Laceration 05/06/2017    TOP OF HEAD    Neck pain     Obesity     Sleep apnea     CPAP MACHINE    Substance abuse in remission ([facility])     IV DRUG USER 30 YEARS AGO    Substance abuse in remission ([facility])     ALCOHOLIC 20 YEARS    Testicular hypofunction 2015    Wears glasses         Allergies   Allergen Reactions    Gabapentin      GI side effects    Seasonal Other (See Comments)     RUNNY NOSE, WATERY EYES       Wt Readings from Last 3 Encounters:   01/17/22 273 lb (123.8 kg)   12/22/21 267 lb (121.1 kg)   12/07/21 278 lb (126.1 kg)     BP Readings from Last 3 Encounters:   01/17/22 138/80   12/22/21 (!) 169/90   12/22/21 (!) 148/89      Temp Readings from Last 3 Encounters:   01/17/22 96.7 °F (35.9 °C) (Tympanic)   12/22/21 98.2 °F (36.8 °C)   12/07/21 98.6 °F (37 °C) (Tympanic)     Pulse Readings from Last 3 Encounters:   01/17/22 84   12/22/21 78   12/07/21 91     SpO2 Readings from Last 3 Encounters:   01/17/22 97%   12/22/21 97%   12/22/21 95%       Subjective:      Review of Systems   Constitutional: Positive for fatigue and fever (not measured). Negative for activity change and appetite change. HENT: Positive for congestion, postnasal drip, rhinorrhea, sinus pain and sore throat. Eyes: Negative. Respiratory: Positive for cough. Cardiovascular: Negative for chest pain. Gastrointestinal: Positive for nausea. Negative for abdominal pain, constipation, diarrhea and vomiting. Endocrine: Negative. Genitourinary: Negative for difficulty urinating and dysuria (Saturday and Sunday 1-2 times each day). Skin: Negative. Negative for rash. Allergic/Immunologic: Negative. Neurological: Positive for headaches. Hematological: Negative. Psychiatric/Behavioral: Negative. All other systems reviewed and are negative. Objective:     Vitals:    01/17/22 1012   BP: 138/80   Site: Right Upper Arm   Position: Sitting   Cuff Size: Large Adult   Pulse: 84   Temp: 96.7 °F (35.9 °C)   TempSrc: Tympanic   SpO2: 97%   Weight: 273 lb (123.8 kg)     Body mass index is 38.08 kg/m². /80 (Site: Right Upper Arm, Position: Sitting, Cuff Size: Large Adult)   Pulse 84   Temp 96.7 °F (35.9 °C) (Tympanic)   Wt 273 lb (123.8 kg)   SpO2 97%   BMI 38.08 kg/m²   Physical Exam  Vitals and nursing note reviewed. Constitutional:       General: He is not in acute distress. Appearance: He is ill-appearing.    HENT:      Right Ear: Hearing, ear canal and external ear normal. There is no impacted cerumen. Tympanic membrane is scarred. Left Ear: Hearing, ear canal and external ear normal. There is no impacted cerumen. Tympanic membrane is scarred. Nose: Mucosal edema, congestion and rhinorrhea present. Rhinorrhea is clear. Right Turbinates: Swollen. Left Turbinates: Swollen. Right Sinus: Maxillary sinus tenderness and frontal sinus tenderness present. Left Sinus: Frontal sinus tenderness present. Mouth/Throat:      Lips: Pink. Mouth: Mucous membranes are moist.      Pharynx: Uvula midline. Pharyngeal swelling, oropharyngeal exudate, posterior oropharyngeal erythema and uvula swelling present. Tonsils: No tonsillar exudate or tonsillar abscesses. Comments: Moderate amount of mucus noted in the pharynx. Eyes:      Conjunctiva/sclera: Conjunctivae normal.      Pupils: Pupils are equal, round, and reactive to light. Cardiovascular:      Rate and Rhythm: Normal rate and regular rhythm. Pulses: Normal pulses. Heart sounds: Normal heart sounds. Pulmonary:      Effort: Pulmonary effort is normal.      Breath sounds: Normal breath sounds. No decreased air movement. No decreased breath sounds, wheezing, rhonchi or rales. Chest:   Breasts:      Right: No supraclavicular adenopathy. Left: No supraclavicular adenopathy. Musculoskeletal:         General: Normal range of motion. Cervical back: Normal range of motion. Lymphadenopathy:      Cervical: Cervical adenopathy present. Right cervical: No deep cervical adenopathy. Left cervical: Posterior cervical adenopathy present. No deep cervical adenopathy. Upper Body:      Right upper body: No supraclavicular adenopathy. Left upper body: No supraclavicular adenopathy. Skin:     General: Skin is warm and dry. Capillary Refill: Capillary refill takes less than 2 seconds. Neurological:      General: No focal deficit present.       Mental Status: He is alert and oriented to person, place, and time. Mental status is at baseline. Psychiatric:         Mood and Affect: Mood normal.         Behavior: Behavior normal.         Judgment: Judgment normal.         Assessment:      Diagnosis Orders   1. Person under investigation for COVID-19  COVID-19   2. Viral upper respiratory illness  COVID-19    loratadine (CLARITIN) 10 MG tablet    fluticasone (FLONASE) 50 MCG/ACT nasal spray   3. Congestion of nasal sinus  COVID-19    loratadine (CLARITIN) 10 MG tablet    fluticasone (FLONASE) 50 MCG/ACT nasal spray   4. Cough  COVID-19   5. Fever, unspecified fever cause  COVID-19   6. Acute viral pharyngitis  COVID-19       Plan:     Discussed exam, POCT findings, plan of care (including prescriptive and supportive as listed below) and follow-up at length with patient. Reviewed all prescribed and recommended medications, administration and side effects. Encouraged to return to the clinic for no improvement and or worsening of symptoms. Patient instructed to go to ER or call 911 if any difficulty breathing, shortness of breath, inability to swallow, hives or temp greater than 103 degrees. All questions were answered and they verbalized understanding and were agreeable with the plan. Return if symptoms worsen or fail to improve.         Electronically signed by SHAKIRA Angel CNP on 1/17/2022 at 10:30 AM

## 2022-01-17 NOTE — LETTER
[address] [facility] A department of [place] [address]  Phone: [phone]  Fax: [phone]        Sandip Parker  YOB: 1960        To Whom it May Concern:      Sandip Parker was seen in my clinic on 1/17/2022 and had COVID testing performed. They may return to work/school after a negative covid test result (results expected within 1-3 days). If their test is positive they will need to quarantine based on current CDC Guidelines for a total of five days from the onset of symptoms. They must be fever free for at least 24 hours without the use of medications prior to returning to work/school. If you have any questions or concerns, please don't hesitate to call.     Sincerely,       SHAKIRA Ceron

## 2022-01-17 NOTE — PATIENT INSTRUCTIONS
COVID swab was obtained today. Results may take 1-3 days. Please go to the emergency room if you become more short of breath, have weakness or extreme fatigue or if you feel you may be dehydrated. You may call the office if it is during normal business hours and request a nurse visit where we check you pulse oximetry/oxygen level. If your level is too low you will be sent to the hospital for further treatment. You are being provided a work or school excuse and are encouraged to quarantine until you test results are back. If you are COVID positive you will be given an additional excuse to lengthen your quarantine. Current guidelines state that you should be fever free for at least 24 hours without the use of Tylenol or ibuprofen and have significant symptom improvement 5 days from start of your symptoms. Recommended over the counter medications/treatments: The use of an antihistamine (Claritin or Zyrtec) and a nasal steroid spray (Flonase or Nasacort) may help with sinus congestion and drainage. Mucinex will also help thin secretions and improve congestion. Honey with or without Lemon may also help with coughing. Probiotics daily may help boost immune system. If you are allowed to take tylenol or ibuprofen you may take these to relieve fever, chills or body aches. Make sure to stay well hydrated by drinking plenty of water. Follow up with primary care provider in 1 to 2 days or as needed if no improvement or worsening of your symptoms. Patient Education        Learning About COVID-19 and Flu Symptoms  How can you tell COVID-19 from the flu? COVID-19 and the flu have similar symptoms. The two can be hard to tell apart. The only way to know for sure which illness you have is to be tested. Since the symptoms are so alike, it makes sense to act as if you have COVID-19 until your test results come back. This means staying home and limiting contact with people in your home.  Marci Bianchi need to wash your hands often and disinfect surfaces that you touch. And be sure to wear a mask when you're around other people. This is also good advice if you think you have the flu. COVID-19 and the flu have these symptoms in common:  · Fever or chills  · Cough  · Shortness of breath  · Fatigue (tiredness)  · Sore throat  · Runny or stuffy nose  · Muscle and body aches  · Headache  · Vomiting and diarrhea (more common in children than adults)  COVID-19 has another symptom that also may occur:  · New loss of taste or smell  COVID-19 symptoms may appear from 2 to 14 days after infection. Flu symptoms usually appear 1 to 4 days after infection. Why should you get a flu shot during the COVID-19 pandemic? It's important to get your yearly flu vaccine. Both the flu and COVID-19 can be active at the same time. You can get sick with both infections at once. And having both may make you more sick than getting just one. The flu vaccine won't protect you from COVID-19. But it can help prevent the flu or reduce its symptoms. If fewer people get very ill with the flu, this will help free up medical resources that are needed for COVID-19 patients. Where can you learn more? Go to [email or web address] and sign in to your [employer] account. Enter C123 in the [employer] box to learn more about \"Learning About COVID-19 and Flu Symptoms. \"     If you do not have an account, please click on the \"Sign Up Now\" link. Current as of: March 26, 2021               Content Version: 13.1  © 8382-9917 Healthwise, Incorporated. Care instructions adapted under license by [facility] ([facility]). If you have questions about a medical condition or this instruction, always ask your healthcare professional. [address] any warranty or liability for your use of this information.

## 2022-01-18 LAB
SARS-COV-2: ABNORMAL
SARS-COV-2: DETECTED
SOURCE: ABNORMAL

## 2022-02-07 DIAGNOSIS — E34.9 HYPOTESTOSTERONISM: ICD-10-CM

## 2022-02-07 RX ORDER — TESTOSTERONE 20.25 MG/1.25G
GEL TOPICAL
Qty: 150 G | Refills: 2 | Status: SHIPPED | OUTPATIENT
Start: 2022-02-07 | End: 2022-03-28 | Stop reason: SDUPTHER

## 2022-02-08 NOTE — TELEPHONE ENCOUNTER
Pt calling stating he got a letter from his ins stating they will not pay for the testosterone as they see no reason for pt to be on it, please advise at above number, [facility] uses [facility].

## 2022-02-08 NOTE — TELEPHONE ENCOUNTER
Medication refilled. Pt will need medication contract. Controlled Substance Monitoring:    Acute and Chronic Pain Monitoring:   RX Monitoring 2/7/2022   Attestation -   Periodic Controlled Substance Monitoring No signs of potential drug abuse or diversion identified.;Obtaining appropriate analgesic effect of treatment.

## 2022-03-03 NOTE — TELEPHONE ENCOUNTER
Pt calling stating he's on testosterone gel and his ins states he doesn't need it and we need to provide a letter of necessity, pt uses [facility], please advise at above number.

## 2022-03-04 NOTE — TELEPHONE ENCOUNTER
Spoke with patient and advised patient that he is due for a testosterone lab test. Last Lab test for testosterone level 07/2021

## 2022-03-12 ENCOUNTER — HOSPITAL ENCOUNTER (OUTPATIENT)
Dept: LAB | Age: 62
Discharge: HOME | End: 2022-03-12
Payer: COMMERCIAL

## 2022-03-12 DIAGNOSIS — E34.9 HYPOTESTOSTERONISM: ICD-10-CM

## 2022-03-12 DIAGNOSIS — R73.01 IMPAIRED FASTING GLUCOSE: ICD-10-CM

## 2022-03-12 DIAGNOSIS — E78.2 MIXED HYPERLIPIDEMIA: ICD-10-CM

## 2022-03-12 LAB
ALBUMIN SERPL-MCNC: 4.3 G/DL (ref 3.5–5.2)
ALBUMIN/GLOBULIN RATIO: 1.4 (ref 1–2.5)
ALP BLD-CCNC: 96 U/L (ref 40–129)
ALT SERPL-CCNC: 39 U/L (ref 5–41)
ANION GAP SERPL CALCULATED.3IONS-SCNC: 12 MMOL/L (ref 9–17)
AST SERPL-CCNC: 26 U/L
BILIRUB SERPL-MCNC: 0.57 MG/DL (ref 0.3–1.2)
BUN BLDV-MCNC: 13 MG/DL (ref 6–20)
BUN/CREAT BLD: 16 (ref 9–20)
CALCIUM SERPL-MCNC: 9.7 MG/DL (ref 8.6–10.4)
CHLORIDE BLD-SCNC: 103 MMOL/L (ref 98–107)
CHOLESTEROL/HDL RATIO: 4.4
CHOLESTEROL: 153 MG/DL
CO2: 24 MMOL/L (ref 20–31)
CREAT SERPL-MCNC: 0.8 MG/DL (ref 0.7–1.2)
GFR AFRICAN AMERICAN: >60 ML/MIN
GFR NON-AFRICAN AMERICAN: >60 ML/MIN
GFR SERPL CREATININE-BSD FRML MDRD: ABNORMAL ML/MIN/{1.73_M2}
GLUCOSE BLD-MCNC: 160 MG/DL (ref 70–99)
HDLC SERPL-MCNC: 35 MG/DL
LDL CHOLESTEROL: 74 MG/DL (ref 0–130)
POTASSIUM SERPL-SCNC: 4.4 MMOL/L (ref 3.7–5.3)
SEX HORMONE BINDING GLOBULIN: 19 NMOL/L (ref 11–80)
SODIUM BLD-SCNC: 139 MMOL/L (ref 135–144)
TESTOSTERONE FREE-NONMALE: 18.4 PG/ML (ref 47–244)
TESTOSTERONE TOTAL: 76 NG/DL (ref 220–1000)
TOTAL PROTEIN: 7.3 G/DL (ref 6.4–8.3)
TRIGL SERPL-MCNC: 218 MG/DL

## 2022-03-12 PROCEDURE — 84270 ASSAY OF SEX HORMONE GLOBUL: CPT

## 2022-03-12 PROCEDURE — 84403 ASSAY OF TOTAL TESTOSTERONE: CPT

## 2022-03-12 PROCEDURE — 80053 COMPREHEN METABOLIC PANEL: CPT

## 2022-03-12 PROCEDURE — 80061 LIPID PANEL: CPT

## 2022-03-12 PROCEDURE — 83036 HEMOGLOBIN GLYCOSYLATED A1C: CPT

## 2022-03-12 PROCEDURE — 36415 COLL VENOUS BLD VENIPUNCTURE: CPT

## 2022-03-13 LAB
ESTIMATED AVERAGE GLUCOSE: 131 MG/DL
HBA1C MFR BLD: 6.2 % (ref 4–6)

## 2022-03-15 DIAGNOSIS — I26.99 OTHER ACUTE PULMONARY EMBOLISM WITHOUT ACUTE COR PULMONALE (HCC): ICD-10-CM

## 2022-03-15 DIAGNOSIS — Z79.01 CHRONIC ANTICOAGULATION: ICD-10-CM

## 2022-03-15 DIAGNOSIS — I82.411 DEEP VENOUS THROMBOSIS OF RIGHT PROFUNDA FEMORIS VEIN (HCC): ICD-10-CM

## 2022-03-15 DIAGNOSIS — E66.01 MORBID OBESITY (HCC): ICD-10-CM

## 2022-03-15 DIAGNOSIS — E34.9 HYPOTESTOSTERONISM: ICD-10-CM

## 2022-03-15 RX ORDER — RIVAROXABAN 20 MG/1
TABLET, FILM COATED ORAL
Qty: 30 TABLET | Refills: 1 | Status: SHIPPED | OUTPATIENT
Start: 2022-03-15

## 2022-03-15 NOTE — TELEPHONE ENCOUNTER
Arnetta Bloch called requesting a refill of the below medication which has been pended for you:     Requested Prescriptions     Pending Prescriptions Disp Refills    XARELTO 20 MG TABS tablet [Pharmacy Med Name: Xarelto 20 MG Oral Tablet] 30 tablet 0     Sig: Take 1 tablet by mouth once daily with breakfast       Last Appointment Date: 11/23/2021  Next Appointment Date: Visit date not found    Allergies   Allergen Reactions    Gabapentin      GI side effects    Seasonal Other (See Comments)     RUNNY NOSE, WATERY EYES

## 2022-03-26 DIAGNOSIS — N52.9 ERECTILE DYSFUNCTION, UNSPECIFIED ERECTILE DYSFUNCTION TYPE: Primary | ICD-10-CM

## 2022-03-28 ENCOUNTER — TELEPHONE (OUTPATIENT)
Dept: FAMILY MEDICINE CLINIC | Age: 62
End: 2022-03-28

## 2022-03-28 DIAGNOSIS — E34.9 HYPOTESTOSTERONISM: ICD-10-CM

## 2022-03-28 RX ORDER — TESTOSTERONE 20.25 MG/1.25G
GEL TOPICAL
Qty: 150 G | Refills: 2 | Status: SHIPPED | OUTPATIENT
Start: 2022-03-28 | End: 2022-10-17

## 2022-03-28 RX ORDER — TADALAFIL 5 MG/1
TABLET ORAL
Qty: 90 TABLET | Refills: 0 | Status: SHIPPED | OUTPATIENT
Start: 2022-03-28 | End: 2022-08-11 | Stop reason: SDUPTHER

## 2022-03-28 NOTE — TELEPHONE ENCOUNTER
Judit Palacios called requesting a refill of the below medication which has been pended for you:     Requested Prescriptions     Pending Prescriptions Disp Refills    tadalafil (CIALIS) 5 MG tablet [Pharmacy Med Name: Tadalafil 5 MG Oral Tablet] 90 tablet 0     Sig: TAKE 1 TABLET BY MOUTH ONCE DAILY AS NEEDED FOR ERECTILE DYSFUNCTION       Last Appointment Date: 11/23/2021  Next Appointment Date: 5/24/2022    Allergies   Allergen Reactions    Gabapentin      GI side effects    Seasonal Other (See Comments)     RUNNY NOSE, WATERY EYES

## 2022-03-28 NOTE — TELEPHONE ENCOUNTER
Patient called [facility] to get testosterone refilled but said he needed a prior auth. Patient aware of lab results.

## 2022-03-28 NOTE — TELEPHONE ENCOUNTER
Medication sent    Controlled Substance Monitoring:    Acute and Chronic Pain Monitoring:   RX Monitoring 3/28/2022   Attestation -   Periodic Controlled Substance Monitoring No signs of potential drug abuse or diversion identified.;Obtaining appropriate analgesic effect of treatment.

## 2022-04-20 ENCOUNTER — TELEPHONE (OUTPATIENT)
Dept: FAMILY MEDICINE CLINIC | Age: 62
End: 2022-04-20

## 2022-04-20 NOTE — TELEPHONE ENCOUNTER
----- Message from Violeta Sulilvan sent at 4/19/2022  4:23 PM EDT -----  Subject: Message to Provider    QUESTIONS  Information for Provider? Patient would like to know if he could be seen   sooner than 05/2 regarding a hardened lymph node in the back of his neck. Pt states it has been there for about a year now but would like it checked   out. Pt would like a call back regarding this please. Thank you!  ---------------------------------------------------------------------------  --------------  CALL BACK INFO  What is the best way for the office to contact you? OK to leave message on   voicemail  Preferred Call Back Phone Number? [phone]  ---------------------------------------------------------------------------  --------------  SCRIPT ANSWERS  Relationship to Patient?  Self

## 2022-05-03 ENCOUNTER — HOSPITAL ENCOUNTER (OUTPATIENT)
Dept: LAB | Age: 62
Discharge: HOME | End: 2022-05-03
Payer: COMMERCIAL

## 2022-05-03 DIAGNOSIS — I26.99 OTHER ACUTE PULMONARY EMBOLISM WITHOUT ACUTE COR PULMONALE (HCC): ICD-10-CM

## 2022-05-03 DIAGNOSIS — Z79.01 CHRONIC ANTICOAGULATION: ICD-10-CM

## 2022-05-03 DIAGNOSIS — E34.9 HYPOTESTOSTERONISM: ICD-10-CM

## 2022-05-03 DIAGNOSIS — E66.01 MORBID OBESITY (HCC): ICD-10-CM

## 2022-05-03 DIAGNOSIS — I82.411 DEEP VENOUS THROMBOSIS OF RIGHT PROFUNDA FEMORIS VEIN (HCC): ICD-10-CM

## 2022-05-03 LAB
ABSOLUTE EOS #: 0.16 K/UL (ref 0–0.44)
ABSOLUTE IMMATURE GRANULOCYTE: 0.05 K/UL (ref 0–0.3)
ABSOLUTE LYMPH #: 2.1 K/UL (ref 1.1–3.7)
ABSOLUTE MONO #: 0.79 K/UL (ref 0.1–1.2)
ANION GAP SERPL CALCULATED.3IONS-SCNC: 11 MMOL/L (ref 9–17)
BASOPHILS # BLD: 1 % (ref 0–2)
BASOPHILS ABSOLUTE: 0.06 K/UL (ref 0–0.2)
BUN BLDV-MCNC: 17 MG/DL (ref 6–20)
BUN/CREAT BLD: 18 (ref 9–20)
CALCIUM SERPL-MCNC: 9.7 MG/DL (ref 8.6–10.4)
CHLORIDE BLD-SCNC: 102 MMOL/L (ref 98–107)
CO2: 26 MMOL/L (ref 20–31)
CREAT SERPL-MCNC: 0.92 MG/DL (ref 0.7–1.2)
EOSINOPHILS RELATIVE PERCENT: 2 % (ref 1–4)
GFR AFRICAN AMERICAN: >60 ML/MIN
GFR NON-AFRICAN AMERICAN: >60 ML/MIN
GFR SERPL CREATININE-BSD FRML MDRD: ABNORMAL ML/MIN/{1.73_M2}
GLUCOSE BLD-MCNC: 128 MG/DL (ref 70–99)
HCT VFR BLD CALC: 47.7 % (ref 40.7–50.3)
HEMOGLOBIN: 16.4 G/DL (ref 13–17)
IMMATURE GRANULOCYTES: 1 %
LYMPHOCYTES # BLD: 26 % (ref 24–43)
MCH RBC QN AUTO: 29.5 PG (ref 25.2–33.5)
MCHC RBC AUTO-ENTMCNC: 34.4 G/DL (ref 25.2–33.5)
MCV RBC AUTO: 85.9 FL (ref 82.6–102.9)
MONOCYTES # BLD: 10 % (ref 3–12)
NRBC AUTOMATED: 0 PER 100 WBC
PDW BLD-RTO: 13.2 % (ref 11.8–14.4)
PLATELET # BLD: 216 K/UL (ref 138–453)
PMV BLD AUTO: 8.8 FL (ref 8.1–13.5)
POTASSIUM SERPL-SCNC: 4.2 MMOL/L (ref 3.7–5.3)
RBC # BLD: 5.55 M/UL (ref 4.21–5.77)
SEG NEUTROPHILS: 60 % (ref 36–65)
SEGMENTED NEUTROPHILS ABSOLUTE COUNT: 4.98 K/UL (ref 1.5–8.1)
SODIUM BLD-SCNC: 139 MMOL/L (ref 135–144)
WBC # BLD: 8.1 K/UL (ref 3.5–11.3)

## 2022-05-03 PROCEDURE — 85025 COMPLETE CBC W/AUTO DIFF WBC: CPT

## 2022-05-03 PROCEDURE — 36415 COLL VENOUS BLD VENIPUNCTURE: CPT

## 2022-05-03 PROCEDURE — 80048 BASIC METABOLIC PNL TOTAL CA: CPT

## 2022-05-05 ENCOUNTER — OFFICE VISIT (OUTPATIENT)
Dept: FAMILY MEDICINE CLINIC | Age: 62
End: 2022-05-05
Payer: COMMERCIAL

## 2022-05-05 VITALS
SYSTOLIC BLOOD PRESSURE: 138 MMHG | WEIGHT: 285 LBS | RESPIRATION RATE: 18 BRPM | BODY MASS INDEX: 39.9 KG/M2 | OXYGEN SATURATION: 99 % | HEART RATE: 79 BPM | HEIGHT: 71 IN | DIASTOLIC BLOOD PRESSURE: 80 MMHG

## 2022-05-05 DIAGNOSIS — H60.502 ACUTE OTITIS EXTERNA OF LEFT EAR, UNSPECIFIED TYPE: ICD-10-CM

## 2022-05-05 DIAGNOSIS — R59.0 LYMPHADENOPATHY OF LEFT CERVICAL REGION: Primary | ICD-10-CM

## 2022-05-05 PROCEDURE — 99214 OFFICE O/P EST MOD 30 MIN: CPT | Performed by: NURSE PRACTITIONER

## 2022-05-05 RX ORDER — AMOXICILLIN AND CLAVULANATE POTASSIUM 875; 125 MG/1; MG/1
1 TABLET, FILM COATED ORAL 2 TIMES DAILY
Qty: 14 TABLET | Refills: 0 | Status: SHIPPED | OUTPATIENT
Start: 2022-05-05 | End: 2022-05-12

## 2022-05-05 ASSESSMENT — PATIENT HEALTH QUESTIONNAIRE - PHQ9
SUM OF ALL RESPONSES TO PHQ9 QUESTIONS 1 & 2: 1
SUM OF ALL RESPONSES TO PHQ QUESTIONS 1-9: 1
SUM OF ALL RESPONSES TO PHQ QUESTIONS 1-9: 1
2. FEELING DOWN, DEPRESSED OR HOPELESS: 1
SUM OF ALL RESPONSES TO PHQ QUESTIONS 1-9: 1
1. LITTLE INTEREST OR PLEASURE IN DOING THINGS: 0
SUM OF ALL RESPONSES TO PHQ QUESTIONS 1-9: 1

## 2022-05-05 NOTE — PROGRESS NOTES
Subjective:      Patient ID: Saji Burgess is a 63 y.o. male coming in for   Chief Complaint   Patient presents with    Skin Lesion     lump on the back of his head that he needs looked at. HPI  Presents to office for concerns of adenopathy to left posterior cervical region. Pt reports the lump has been there for at least a year if not longer. Denies any infectious process that would cause adenopathy. Denies pain     Review of Systems   HENT: Negative for ear pain. Hematological: Positive for adenopathy. Objective:  /80   Pulse 79   Resp 18   Ht 5' 11\" (1.803 m)   Wt 285 lb (129.3 kg)   SpO2 99%   BMI 39.75 kg/m²      Physical Exam  Vitals and nursing note reviewed. Constitutional:       General: He is not in acute distress. Appearance: Normal appearance. He is not ill-appearing. HENT:      Head: Normocephalic. Right Ear: Tympanic membrane normal.      Left Ear: A middle ear effusion is present. Tympanic membrane is erythematous. Mouth/Throat:      Mouth: Mucous membranes are moist.      Pharynx: Oropharynx is clear. No oropharyngeal exudate or posterior oropharyngeal erythema. Cardiovascular:      Rate and Rhythm: Normal rate and regular rhythm. Heart sounds: Normal heart sounds. Pulmonary:      Effort: Pulmonary effort is normal.      Breath sounds: Normal breath sounds. Musculoskeletal:      Right lower leg: No edema. Left lower leg: No edema. Lymphadenopathy:      Head:      Left side of head: Occipital adenopathy present. Skin:     General: Skin is warm and dry. Findings: No rash. Neurological:      General: No focal deficit present. Mental Status: He is alert and oriented to person, place, and time. Assessment:      1. Lymphadenopathy of left cervical region    2.  Acute otitis externa of left ear, unspecified type           Plan:   -will trial augmentin to see if this relieves  Ear infection along with adenopathy  -also ordered US as well  F/u in one week     Orders Placed This Encounter   Procedures    US HEAD NECK SOFT TISSUE THYROID     Standing Status:   Future     Standing Expiration Date:   5/5/2023     Order Specific Question:   Reason for exam:     Answer:   chronic left side cervical adenopathy. Outpatient Encounter Medications as of 5/5/2022   Medication Sig Dispense Refill    amoxicillin-clavulanate (AUGMENTIN) 875-125 MG per tablet Take 1 tablet by mouth 2 times daily for 7 days 14 tablet 0    tadalafil (CIALIS) 5 MG tablet TAKE 1 TABLET BY MOUTH ONCE DAILY AS NEEDED FOR ERECTILE DYSFUNCTION 90 tablet 0    Testosterone 1.62 % GEL APPLY 4 PUMP ACTUATIONS TOPICALLY ONCE DAILY 150 g 2    XARELTO 20 MG TABS tablet Take 1 tablet by mouth once daily with breakfast 30 tablet 1    fluticasone (FLONASE) 50 MCG/ACT nasal spray 1 spray by Each Nostril route 2 times daily 16 g 0    atorvastatin (LIPITOR) 20 MG tablet Take 1 tablet by mouth daily 90 tablet 1    buPROPion (WELLBUTRIN XL) 150 MG extended release tablet TAKE 1 TABLET BY MOUTH ONCE DAILY IN THE MORNING 30 tablet 2    lisinopril-hydroCHLOROthiazide (PRINZIDE;ZESTORETIC) 20-25 MG per tablet Take 1 tablet by mouth once daily 90 tablet 3    metFORMIN (GLUCOPHAGE) 500 MG tablet TAKE 2 TABLETS BY MOUTH ONCE DAILY WITH BREAKFAST 180 tablet 3    hydroxychloroquine (PLAQUENIL) 200 MG tablet TAKE 2 TABLETS BY MOUTH ONCE DAILY      sildenafil (VIAGRA) 50 MG tablet TAKE 1 TABLET BY MOUTH AS NEEDED FOR  ERECTILE DYSFUNCTION 30 tablet 1    therapeutic multivitamin-minerals (THERAGRAN-M) tablet Take 1 tablet by mouth daily.  bisacodyl (BISACODYL) 5 MG EC tablet Take two 5mg tablets at 12pm noon the day before procedure. 2 tablet 0     No facility-administered encounter medications on file as of 5/5/2022.             Gal Clark, SHAKIRA - CNP

## 2022-05-09 ENCOUNTER — HOSPITAL ENCOUNTER (OUTPATIENT)
Dept: ULTRASOUND IMAGING | Age: 62
Discharge: HOME | End: 2022-05-11
Payer: COMMERCIAL

## 2022-05-09 DIAGNOSIS — R59.0 LYMPHADENOPATHY OF LEFT CERVICAL REGION: ICD-10-CM

## 2022-05-09 PROCEDURE — 76536 US EXAM OF HEAD AND NECK: CPT

## 2022-05-13 ENCOUNTER — OFFICE VISIT (OUTPATIENT)
Dept: FAMILY MEDICINE CLINIC | Age: 62
End: 2022-05-13
Payer: COMMERCIAL

## 2022-05-13 VITALS
WEIGHT: 280 LBS | SYSTOLIC BLOOD PRESSURE: 140 MMHG | RESPIRATION RATE: 18 BRPM | HEIGHT: 71 IN | OXYGEN SATURATION: 95 % | DIASTOLIC BLOOD PRESSURE: 66 MMHG | HEART RATE: 90 BPM | BODY MASS INDEX: 39.2 KG/M2

## 2022-05-13 DIAGNOSIS — R59.0 LYMPHADENOPATHY OF LEFT CERVICAL REGION: Primary | ICD-10-CM

## 2022-05-13 PROCEDURE — 99214 OFFICE O/P EST MOD 30 MIN: CPT | Performed by: NURSE PRACTITIONER

## 2022-05-13 ASSESSMENT — ENCOUNTER SYMPTOMS
ABDOMINAL PAIN: 0
SHORTNESS OF BREATH: 0
CONSTIPATION: 0
BLOOD IN STOOL: 0
DIARRHEA: 0

## 2022-05-13 NOTE — PROGRESS NOTES
Subjective:      Patient ID: Jasmin Valdez is a 63 y.o. male coming in for   Chief Complaint   Patient presents with   [facility]     follow up US        HPI  One week f/u for left posterior cervical adenopathy. US was done and pt treated with one week of augmentin es. Pt reports no improvement in adenopathy. US head/neck 5/9/22:  No significant change in an indeterminate 1.8 cm x 1.4 cm x 0.9 cm lesion in   the deep subcutaneous tissues of the posterior left neck at the area of   palpable concern.  The most likely considerations are a subcutaneous lipoma   or area of fat necrosis with no suspicious features identified.  Recommend   clinical follow-up with consideration for MRI or CT. Review of Systems   Constitutional: Negative for fatigue and unexpected weight change. Respiratory: Negative for shortness of breath. Cardiovascular: Negative for chest pain and leg swelling. Gastrointestinal: Negative for abdominal pain, blood in stool, constipation and diarrhea. Skin: Negative for rash. Neurological: Negative for dizziness, light-headedness and headaches. Objective:  BP (!) 140/66   Pulse 90   Resp 18   Ht 5' 11\" (1.803 m)   Wt 280 lb (127 kg)   SpO2 95%   BMI 39.05 kg/m²      Physical Exam  Vitals and nursing note reviewed. Constitutional:       General: He is not in acute distress. Appearance: Normal appearance. He is not ill-appearing. HENT:      Head: Normocephalic. Right Ear: Tympanic membrane normal.      Left Ear: Tympanic membrane normal.      Mouth/Throat:      Mouth: Mucous membranes are moist.      Pharynx: Oropharynx is clear. No oropharyngeal exudate or posterior oropharyngeal erythema. Cardiovascular:      Rate and Rhythm: Normal rate and regular rhythm. Heart sounds: Normal heart sounds. Pulmonary:      Effort: Pulmonary effort is normal.      Breath sounds: Normal breath sounds. Musculoskeletal:      Right lower leg: No edema.       Left lower leg: No edema. Lymphadenopathy:      Head:      Left side of head: Occipital adenopathy present. Skin:     General: Skin is warm and dry. Findings: No rash. Neurological:      General: No focal deficit present. Mental Status: He is alert and oriented to person, place, and time. Assessment:      1. Lymphadenopathy of left cervical region           Plan:   -will refer to gen surg to see if they think it is worth biopsying and or further imaging, pt agreeable.       Orders Placed This Encounter   Procedures   [address] [place] [address],[facility] [address] General Surgery, [place]     Referral Priority:   Routine     Referral Type:   Eval and Treat     Referral Reason:   Specialty Services Required     Requested Specialty:   General Surgery     Number of Visits Requested:   1      Outpatient Encounter Medications as of 5/13/2022   Medication Sig Dispense Refill    tadalafil (CIALIS) 5 MG tablet TAKE 1 TABLET BY MOUTH ONCE DAILY AS NEEDED FOR ERECTILE DYSFUNCTION 90 tablet 0    Testosterone 1.62 % GEL APPLY 4 PUMP ACTUATIONS TOPICALLY ONCE DAILY 150 g 2    XARELTO 20 MG TABS tablet Take 1 tablet by mouth once daily with breakfast 30 tablet 1    fluticasone (FLONASE) 50 MCG/ACT nasal spray 1 spray by Each Nostril route 2 times daily 16 g 0    atorvastatin (LIPITOR) 20 MG tablet Take 1 tablet by mouth daily 90 tablet 1    buPROPion (WELLBUTRIN XL) 150 MG extended release tablet TAKE 1 TABLET BY MOUTH ONCE DAILY IN THE MORNING 30 tablet 2    lisinopril-hydroCHLOROthiazide (PRINZIDE;ZESTORETIC) 20-25 MG per tablet Take 1 tablet by mouth once daily 90 tablet 3    metFORMIN (GLUCOPHAGE) 500 MG tablet TAKE 2 TABLETS BY MOUTH ONCE DAILY WITH BREAKFAST 180 tablet 3    hydroxychloroquine (PLAQUENIL) 200 MG tablet TAKE 2 TABLETS BY MOUTH ONCE DAILY      sildenafil (VIAGRA) 50 MG tablet TAKE 1 TABLET BY MOUTH AS NEEDED FOR  ERECTILE DYSFUNCTION 30 tablet 1    therapeutic multivitamin-minerals (THERAGRAN-M) tablet Take 1 tablet by mouth daily. No facility-administered encounter medications on file as of 5/13/2022.             Jarrett Scott, SHAKIRA - CNP

## 2022-05-16 ENCOUNTER — OFFICE VISIT (OUTPATIENT)
Dept: FAMILY MEDICINE CLINIC | Age: 62
End: 2022-05-16
Payer: COMMERCIAL

## 2022-05-16 VITALS
DIASTOLIC BLOOD PRESSURE: 70 MMHG | SYSTOLIC BLOOD PRESSURE: 130 MMHG | WEIGHT: 288 LBS | BODY MASS INDEX: 40.32 KG/M2 | HEART RATE: 78 BPM | HEIGHT: 71 IN | OXYGEN SATURATION: 94 %

## 2022-05-16 DIAGNOSIS — R22.1 LOCALIZED SWELLING, MASS OR LUMP OF NECK: ICD-10-CM

## 2022-05-16 DIAGNOSIS — M54.50 ACUTE RIGHT-SIDED LOW BACK PAIN WITHOUT SCIATICA: Primary | ICD-10-CM

## 2022-05-16 DIAGNOSIS — Z87.898 HISTORY OF PREDIABETES: ICD-10-CM

## 2022-05-16 DIAGNOSIS — E66.01 MORBID OBESITY (HCC): ICD-10-CM

## 2022-05-16 PROCEDURE — 99214 OFFICE O/P EST MOD 30 MIN: CPT | Performed by: FAMILY MEDICINE

## 2022-05-16 RX ORDER — CYCLOBENZAPRINE HCL 5 MG
5 TABLET ORAL NIGHTLY PRN
Qty: 10 TABLET | Refills: 0 | Status: SHIPPED | OUTPATIENT
Start: 2022-05-16 | End: 2022-05-26

## 2022-05-16 NOTE — PROGRESS NOTES
HPI:  Patient comes in today for   Chief Complaint   Patient presents with    Back Pain     Lower back pain since yesterday. No injury. No radiculopathy. Patient here with c/o pain in the low back since yesterday has had in the past and usualy a muscle relaxant take care of it. Denies any injury. Pain is on the right lower side no radiation no bowel or bladder dysfunction. Also has h/o cervical fusion surgery in past.Denies any chest pain or SOB. h/o Prediabetes recent hgba1c was 6.2.h/o antiphospholipid syndrome is on xarelto.h/o left neck swelling  is seeing surgery next week    HISTORY:  Past Medical History:   Diagnosis Date    Antiphospholipid syndrome ([address]     Cervical disc disease     Depression     Diabetes mellitus ([address]     PREDIABETIC    Erectile dysfunction     Hepatitis C     tx 6mon completed 8/913 Dr Leyva Finger History of gastroesophageal reflux (GERD)     Hypertension     Hypotestosteronism     Impaired fasting glucose     Laceration 05/06/2017    TOP OF HEAD    Neck pain     Obesity     Sleep apnea     CPAP MACHINE    Substance abuse in remission ([address]     IV DRUG USER 30 YEARS AGO    Substance abuse in remission ([address]     ALCOHOLIC 20 YEARS    Testicular hypofunction 2015    Wears glasses        Past Surgical History:   Procedure Laterality Date    CERVICAL FUSION  05/09/2017    C5-7    CERVICAL FUSION N/A 5/9/2017     ANTERIOR CERVICAL DECOMPRESSION FUSION C5-C7 performed by Fab Osorio MD at [place] [address]  06/18/2014    polyps    COLONOSCOPY  9/2015    sigmoid diverticulosis    COLONOSCOPY N/A 12/22/2021    COLONOSCOPY WITH BIOPSY performed [facility] at [facility] Left     2ND  AND 3RD TOE DR ROLLING [facility] 12/20/12    LIVER BIOPSY      TYMPANIC MEMBRANE REPAIR      MULTIPLE AS A CHILD        Family History   Problem Relation Age of Onset    High Blood Pressure Mother     Diabetes Mother     Cataracts Organizations: Not on file    Attends Club or Organization Meetings: Not on file    Marital Status: Not on file   Intimate Partner Violence:     Fear of Current or Ex-Partner: Not on file    Emotionally Abused: Not on file    Physically Abused: Not on file    Sexually Abused: Not on file   Housing Stability:     Unable to Pay for Housing in the Last Year: Not on file    Number of [place] in the Last Year: Not on file    Unstable Housing in the Last Year: Not on file       Current Outpatient Medications   Medication Sig Dispense Refill    tadalafil (CIALIS) 5 MG tablet TAKE 1 TABLET BY MOUTH ONCE DAILY AS NEEDED FOR ERECTILE DYSFUNCTION 90 tablet 0    Testosterone 1.62 % GEL APPLY 4 PUMP ACTUATIONS TOPICALLY ONCE DAILY 150 g 2    XARELTO 20 MG TABS tablet Take 1 tablet by mouth once daily with breakfast 30 tablet 1    atorvastatin (LIPITOR) 20 MG tablet Take 1 tablet by mouth daily 90 tablet 1    buPROPion (WELLBUTRIN XL) 150 MG extended release tablet TAKE 1 TABLET BY MOUTH ONCE DAILY IN THE MORNING 30 tablet 2    lisinopril-hydroCHLOROthiazide (PRINZIDE;ZESTORETIC) 20-25 MG per tablet Take 1 tablet by mouth once daily 90 tablet 3    metFORMIN (GLUCOPHAGE) 500 MG tablet TAKE 2 TABLETS BY MOUTH ONCE DAILY WITH BREAKFAST 180 tablet 3    hydroxychloroquine (PLAQUENIL) 200 MG tablet TAKE 2 TABLETS BY MOUTH ONCE DAILY      sildenafil (VIAGRA) 50 MG tablet TAKE 1 TABLET BY MOUTH AS NEEDED FOR  ERECTILE DYSFUNCTION 30 tablet 1    therapeutic multivitamin-minerals (THERAGRAN-M) tablet Take 1 tablet by mouth daily.  fluticasone (FLONASE) 50 MCG/ACT nasal spray 1 spray by Each Nostril route 2 times daily (Patient not taking: Reported on 5/16/2022) 16 g 0     No current facility-administered medications for this visit.         Allergies   Allergen Reactions    Gabapentin      GI side effects    Seasonal Other (See Comments)     RUNNY NOSE, WATERY EYES       REVIEW OF SYSTEMS:  General: No fevers, chills, change in weight  HEENT: No double vision, blurry vision, runny nose, sore throat, tinnitus  Cardio: No chest pain, palpitations, JARAMILLO, edema, PND  Pulmonary: No cough, hemoptysis, SOB  GI: No nausea, vomiting, dysphagia, odynophagia, diarrhea, constipation. : No dysuria, hematuria, urgency, incontinence  Musculoskeletal: No muscle or joint aches, no joint swelling  Neuro: No dizziness/lightheadedness, no seizures  Endocrine: No polyuria, polydipsia, polyphagia, no temperature intolerance  Skin: No lesions or itching  No problems with ADLs  Sleep: good  Psychiatric: No depression    PHYSICAL EXAM:  VS:  /70   Pulse 78   Ht 5' 11\" (1.803 m)   Wt 288 lb (130.6 kg)   SpO2 94%   BMI 40.17 kg/m²   General:  Alert and oriented, NAD  HEENT:  TMs, ROSA, EOMI, Conjunctivae clear       Throat currently clear. NECK: Has a large left posterior subcutaneous swelling in the neck Supple without adenopathy or thyromegaly, no carotid bruits  LUNGS:  CTA all fields  HEART:  RRR without M, R, or G  ABDOMEN:  Soft and nontender without palpable abnormalities. BACK:Has tenderness right lower paravertabral area  EXTREMITIES:  Without clubbing, cyanosis, or edema, no calf tenderness  NEURO:  No focal deficits. SKIN:  warm to touch,normal texture. No active lesions. ASSESSMENT/PLAN:     Diagnosis Orders   1. Acute right-sided low back pain without sciatica     2. Morbid obesity ([address]     3. Localized swelling, mass or lump of neck     4. History of prediabetes         No orders of the defined types were placed in this encounter. Requested Prescriptions     Signed Prescriptions Disp Refills    cyclobenzaprine (FLEXERIL) 5 MG tablet 10 tablet 0     Sig: Take 1 tablet by mouth nightly as needed for Muscle spasms   Flexeril 5 mg tid prn . Will take tylenol, prn pain. Moist heat to back. F/u with surgery for neck mass.   F/U with PCP for ongoing care    Return if symptoms worsen or fail to improve.     Electronically signed by Mauricio Maza MD

## 2022-05-17 ENCOUNTER — OFFICE VISIT (OUTPATIENT)
Dept: ONCOLOGY | Age: 62
End: 2022-05-17
Payer: COMMERCIAL

## 2022-05-17 VITALS
BODY MASS INDEX: 40.15 KG/M2 | TEMPERATURE: 98.1 F | DIASTOLIC BLOOD PRESSURE: 76 MMHG | OXYGEN SATURATION: 98 % | HEIGHT: 71 IN | HEART RATE: 94 BPM | SYSTOLIC BLOOD PRESSURE: 124 MMHG | WEIGHT: 286.8 LBS

## 2022-05-17 DIAGNOSIS — I82.411 DEEP VENOUS THROMBOSIS OF RIGHT PROFUNDA FEMORIS VEIN (HCC): ICD-10-CM

## 2022-05-17 DIAGNOSIS — E66.01 MORBID OBESITY (HCC): ICD-10-CM

## 2022-05-17 DIAGNOSIS — I26.99 OTHER ACUTE PULMONARY EMBOLISM WITHOUT ACUTE COR PULMONALE (HCC): Primary | ICD-10-CM

## 2022-05-17 DIAGNOSIS — E34.9 HYPOTESTOSTERONISM: ICD-10-CM

## 2022-05-17 DIAGNOSIS — Z79.01 CHRONIC ANTICOAGULATION: ICD-10-CM

## 2022-05-17 PROCEDURE — 99214 OFFICE O/P EST MOD 30 MIN: CPT | Performed by: INTERNAL MEDICINE

## 2022-05-17 NOTE — PROGRESS NOTES
Asael Merino                                                                                                                  5/17/2022  MRN:   [id number]  YOB: 1960  PCP:                           SHAKIRA Gaitan CNP  Referring Physician: No ref. provider found  Treating Physician Name: Saray Devi MD      Reason for visit:  Chief Complaint   Patient presents with    Pulmonary Embolism     6 month follow up        Current problems/ Active and recent treatments:  Right femoral, popliteal and gastrocnemius DVT  Segmental and subsegmental pulmonary embolism. Hypogonadism on testosterone iron supplementation  Arthritis    Interval history:     Patient presents to the clinic for a follow-up visit and to discuss results of lab work-up and other relevant clinical data. Patient continues to be in anticoagulation. Tolerating anticoagulation without any complications. Patient is also continuing to be on testosterone supplements. Continues to be active. He is working on weight loss. Patient wants to go off anticoagulation. During this visit patient's allergy, social, medical, surgical history and medications were reviewed and updated. Summary of Case/History:    Aseal Merino a 63 y.o.male presents to the office to establish care and for further evaluation treatment recommendation regarding anticoagulation for patient's acute DVT and pulmonary embolism  Patient started noticing swelling of his right lower extremity back in July 2019. Work-up including ultrasound of lower extremity confirmed right lower extremity DVT involving femoral popliteal and gastrocnemius vein. Additional work-up including CT chest showed segmental and subsegmental very embolism. Patient was started on anticoagulation and discharged home on Xarelto. Patient recalls any surgery travel trauma before the pulmonary embolism and DVT. Patient does not smoke.   He does not give any personal history of previous venous thrombi embolism. Does not give any family history of venous thrombi embolism. Patient is on testosterone supplementation. His BMI is 39.4. Patient has been tolerating anticoagulation without any complications or severe side effects.       Past Medical History:   Past Medical History:   Diagnosis Date    [facility]iphosph[facility]     Cervical disc disease     Depression     Diabetes mellitus ([facility]     PREDIABETIC    Erectile dysfunction     Hepatitis C     tx 6mon completed 8/913 Dr Moreno  TREATMENT    History of gastroesophageal reflux (GERD)     Hypertension     Hypotestosteronism     Impaired fasting glucose     Laceration 05/06/2017    TOP OF HEAD    Neck pain     Obesity     Sleep apnea     CPAP MACHINE    Substance abuse in remission ([facility]     IV DRUG USER 30 YEARS AGO    Substance abuse in remission ([facility]     ALCOHOLIC 20 YEARS    Testicular hypofunction 2015    Wears glasses        Past Surgical History:     Past Surgical History:   Procedure Laterality Date    CERVICAL FUSION  05/09/2017    C5-7    CERVICAL FUSION N/A 5/9/2017     ANTERIOR CERVICAL DECOMPRESSION FUSION C5-C7 performed by Eduardo Guillaume MD at [address] [place] [address]  06/18/2014    polyps    COLONOSCOPY  9/2015    sigmoid diverticulosis    COLONOSCOPY N/A 12/22/2021    COLONOSCOPY WITH BIOPSY performed by Gurpreet Mckeon MD at [facility] Left     2ND  AND 3RD TOE  [facility] 12/20/12    LIVER BIOPSY      TYMPANIC MEMBRANE REPAIR      MULTIPLE AS A CHILD       Patient Family Social History:     Social History     Socioeconomic History    Marital status:      Spouse name: None    Number of children: None    Years of education: None    Highest education level: None   Occupational History    None   Tobacco Use    Smoking status: Former Smoker     Packs/day: 1.50     Years: 9.00     Pack years: 13.50     Types: Cigarettes     Quit date: 8/14/1992 Years since quittin.7    Smokeless tobacco: Former User     Quit date: 1990    Tobacco comment: Levi Goins RRT 19   Vaping Use    Vaping Use: Never used   Substance and Sexual Activity    Alcohol use: No     Alcohol/week: 0.0 standard drinks     Comment: HISTORY OF ALCOHOL ABUSE 30 YEARS AGO quit     Drug use: No     Types: IV     Comment: HISTORY OF IV DRUG USE 30 YEARS AGO    Sexual activity: None   Other Topics Concern    None   Social History Narrative    None     Social Determinants of Health     Financial Resource Strain: Low Risk     Difficulty of Paying Living Expenses: Not very hard   Food Insecurity: No Food Insecurity    Worried About Running Out of Food in the Last Year: Never true    Jonah of Food in the Last Year: Never true   Transportation Needs:     Lack of Transportation (Medical): Not on file    Lack of Transportation (Non-Medical):  Not on file   Physical Activity:     Days of Exercise per Week: Not on file    Minutes of Exercise per Session: Not on file   Stress:     Feeling of Stress : Not on file   Social Connections:     Frequency of Communication with Friends and Family: Not on file    Frequency of Social Gatherings with Friends and Family: Not on file    Attends [employer] Services: Not on file    Active Member of [address] [place] [address] [employer] or Organizations: Not on file    Attends Club or Organization Meetings: Not on file    Marital Status: Not on file   Intimate Partner Violence:     Fear of Current or Ex-Partner: Not on file    Emotionally Abused: Not on file    Physically Abused: Not on file    Sexually Abused: Not on file   Housing Stability:     Unable to Pay for Housing in the Last Year: Not on file    Number of [place] in the Last Year: Not on file    Unstable Housing in the Last Year: Not on file     Family History   Problem Relation Age of Onset    High Blood Pressure Mother     Diabetes Mother     Cataracts Mother     Cancer Father         Unknown primary. May have been colon cancer    Stroke Father     Diabetes Sister     Other Brother         HEPATITIS C    Diabetes Brother     Glaucoma Neg Hx        Current Medications:     Current Outpatient Medications   Medication Sig Dispense Refill    cyclobenzaprine (FLEXERIL) 5 MG tablet Take 1 tablet by mouth nightly as needed for Muscle spasms 10 tablet 0    tadalafil (CIALIS) 5 MG tablet TAKE 1 TABLET BY MOUTH ONCE DAILY AS NEEDED FOR ERECTILE DYSFUNCTION 90 tablet 0    Testosterone 1.62 % GEL APPLY 4 PUMP ACTUATIONS TOPICALLY ONCE DAILY 150 g 2    XARELTO 20 MG TABS tablet Take 1 tablet by mouth once daily with breakfast 30 tablet 1    fluticasone (FLONASE) 50 MCG/ACT nasal spray 1 spray by Each Nostril route 2 times daily 16 g 0    atorvastatin (LIPITOR) 20 MG tablet Take 1 tablet by mouth daily 90 tablet 1    buPROPion (WELLBUTRIN XL) 150 MG extended release tablet TAKE 1 TABLET BY MOUTH ONCE DAILY IN THE MORNING 30 tablet 2    lisinopril-hydroCHLOROthiazide (PRINZIDE;ZESTORETIC) 20-25 MG per tablet Take 1 tablet by mouth once daily 90 tablet 3    metFORMIN (GLUCOPHAGE) 500 MG tablet TAKE 2 TABLETS BY MOUTH ONCE DAILY WITH BREAKFAST 180 tablet 3    hydroxychloroquine (PLAQUENIL) 200 MG tablet TAKE 2 TABLETS BY MOUTH ONCE DAILY      sildenafil (VIAGRA) 50 MG tablet TAKE 1 TABLET BY MOUTH AS NEEDED FOR  ERECTILE DYSFUNCTION 30 tablet 1    therapeutic multivitamin-minerals (THERAGRAN-M) tablet Take 1 tablet by mouth daily. No current facility-administered medications for this visit. Allergies:   Gabapentin and Seasonal    Review of Systems:    Constitutional: No fever or chills.  No night sweats, no weight loss   Eyes: No eye discharge, double vision, or eye pain   HEENT: negative for sore mouth, sore throat, hoarseness and voice change   Respiratory: negative for cough , sputum, dyspnea, wheezing, hemoptysis, chest pain   Cardiovascular: negative for chest pain, dyspnea, palpitations, orthopnea, PND   Gastrointestinal: negative for nausea, vomiting, diarrhea, constipation, abdominal pain, Dysphagia, hematemesis and hematochezia   Genitourinary: negative for frequency, dysuria, nocturia, urinary incontinence, and hematuria   Integument: negative for rash, skin lesions, bruises.    Hematologic/Lymphatic: negative for easy bruising, bleeding, lymphadenopathy, or petechiae   Endocrine: negative for heat or cold intolerance,weight changes, change in bowel habits and hair loss   Musculoskeletal: Positive joint pain  Neurological: negative for headaches, dizziness, seizures, weakness, numbness        Physical Exam:    Vitals: /76 (Site: Left Upper Arm, Position: Sitting, Cuff Size: Large Adult)   Pulse 94   Temp 98.1 °F (36.7 °C)   Ht 5' 11\" (1.803 m)   Wt 286 lb 12.8 oz (130.1 kg)   SpO2 98%   BMI 40.00 kg/m²   General appearance - well appearing, no in pain or distress  Mental status - AAO X3  Eyes - pupils equal and reactive, extraocular eye movements intact  Mouth - mucous membranes moist, pharynx normal without lesions  Neck - supple, no significant adenopathy  Lymphatics - no palpable lymphadenopathy, no hepatosplenomegaly  Chest - clear to auscultation, no wheezes, rales or rhonchi, symmetric air entry  Heart - normal rate, regular rhythm, normal S1, S2, no murmurs  Abdomen - soft, nontender, nondistended, no masses or organomegaly  Neurological - alert, oriented, normal speech, no focal findings or movement disorder noted  Extremities - peripheral pulses normal, no pedal edema, no clubbing or cyanosis  Skin - normal coloration and turgor, no rashes, no suspicious skin lesions noted       DATA:    Results for orders placed or performed during the hospital encounter of 56/22/92   Basic Metabolic Panel   Result Value Ref Range    Glucose 128 (H) 70 - 99 mg/dL    BUN 17 6 - 20 mg/dL    CREATININE 0.92 0.70 - 1.20 mg/dL    Bun/Cre Ratio 18 9 - 20    Calcium 9.7 8.6 - 10.4 mg/dL    Sodium 139 135 - 144 mmol/L    Potassium 4.2 3.7 - 5.3 mmol/L    Chloride 102 98 - 107 mmol/L    CO2 26 20 - 31 mmol/L    Anion Gap 11 9 - 17 mmol/L    GFR Non-African American >60 >60 mL/min    GFR African American >60 >60 mL/min    GFR Comment         CBC Auto Differential   Result Value Ref Range    WBC 8.1 3.5 - 11.3 k/uL    RBC 5.55 4.21 - 5.77 m/uL    Hemoglobin 16.4 13.0 - 17.0 g/dL    Hematocrit 47.7 40.7 - 50.3 %    MCV 85.9 82.6 - 102.9 fL    MCH 29.5 25.2 - 33.5 pg    MCHC 34.4 (H) 25.2 - 33.5 g/dL    RDW 13.2 11.8 - 14.4 %    Platelets 364 267 - 693 k/uL    MPV 8.8 8.1 - 13.5 fL    NRBC Automated 0.0 0.0 per 100 WBC    Seg Neutrophils 60 36 - 65 %    Lymphocytes 26 24 - 43 %    Monocytes 10 3 - 12 %    Eosinophils % 2 1 - 4 %    Basophils 1 0 - 2 %    Immature Granulocytes 1 (H) 0 %    Segs Absolute 4.98 1.50 - 8.10 k/uL    Absolute Lymph # 2.10 1.10 - 3.70 k/uL    Absolute Mono # 0.79 0.10 - 1.20 k/uL    Absolute Eos # 0.16 0.00 - 0.44 k/uL    Basophils Absolute 0.06 0.00 - 0.20 k/uL    Absolute Immature Granulocyte 0.05 0.00 - 0.30 k/uL     CT chest 7/2019      Impression   Intraluminal pulmonary arterial filling defects within segmental and   subsegmental branches within the right upper lobe.  Subsegmental filling   defect within the lower lobes bilaterally.  Findings compatible with   multifocal pulmonary emboli.  No evidence for pulmonary infarction or right   heart strain.       Findings were discussed with JANY VEGAS at 10:31 am on 7/1/2019.             Vl Dup Lower Extremity Venous Right    Result Date: 1/12/2021  EXAMINATION: DUPLEX VENOUS ULTRASOUND OF THE RIGHT LOWER EXTREMITY, 1/12/2021 7:20 am TECHNIQUE: Duplex ultrasound and Doppler images were obtained of the right lower extremity.  COMPARISON: 07/01/2019 HISTORY: ORDERING SYSTEM PROVIDED HISTORY: Deep venous thrombosis of right profunda femoris vein (HCC) FINDINGS: The visualized veins of the right lower extremity are without evidence of acute thrombus. Residual thrombus is noted within the distal femoral, popliteal and posterior tibial veins with evidence of recanalized flow. No evidence of acute DVT in the right lower extremity with chronic/residual thrombosis as described. Impression:  Right lower extremity DVT involving femoral popliteal and gastrocnemius vein. Right segmental and subsegmental pulmonary embolism. Testosterone supplementation  BMI of 41.1  Hypertension  Depression    Plan:  Personally reviewed results of lab work-up and other relevant clinical data. Patient thrombophilia work-up including factor V Leyden and prothrombin gene mutation were negative. Patient's  risk factors include male sex obesity testosterone and advancing age. Patient is not willing to go off the testosterone supplements. He is working on weight loss    I discussed risk and benefit of discontinuation of anticoagulation. Patient wishes to go off anticoagulation if possible. I explained to the patient that he still has some risk factors that can lead to recurrent VTE. Patient expressed understanding. He wishes to continue testosterone. After detailed discussion we decided to discontinue anticoagulation. We will recheck D-dimer once patient has been off anticoagulation at least for a month and see patient back in office. If D-dimer is elevated this will suggest increased probability of having recurrent VTE. Patient had multiple questions which answered to the best of my ability. Venkatesh Nunze MD        This note is created with the assistance of a speech recognition program.  While intending to generate a document that actually reflects the content of the visit, the document can still have some errors including those of syntax and sound a like substitutions which may escape proof reading. It such instances, actual meaning can be extrapolated by contextual diversion.

## 2022-06-09 DIAGNOSIS — E78.2 MIXED HYPERLIPIDEMIA: ICD-10-CM

## 2022-06-09 DIAGNOSIS — I10 ESSENTIAL HYPERTENSION: ICD-10-CM

## 2022-06-09 DIAGNOSIS — R73.01 IMPAIRED FASTING GLUCOSE: ICD-10-CM

## 2022-06-10 RX ORDER — LISINOPRIL AND HYDROCHLOROTHIAZIDE 25; 20 MG/1; MG/1
TABLET ORAL
Qty: 90 TABLET | Refills: 0 | Status: SHIPPED | OUTPATIENT
Start: 2022-06-10

## 2022-06-10 RX ORDER — ATORVASTATIN CALCIUM 20 MG/1
TABLET, FILM COATED ORAL
Qty: 90 TABLET | Refills: 0 | Status: SHIPPED | OUTPATIENT
Start: 2022-06-10 | End: 2022-09-12

## 2022-06-10 NOTE — TELEPHONE ENCOUNTER
Amedeo Nails called requesting a refill of the below medication which has been pended for you:     Requested Prescriptions     Pending Prescriptions Disp Refills    atorvastatin (LIPITOR) 20 MG tablet [Pharmacy Med Name: Atorvastatin Calcium 20 MG Oral Tablet] 90 tablet 0     Sig: Take 1 tablet by mouth once daily    lisinopril-hydroCHLOROthiazide (PRINZIDE;ZESTORETIC) 20-25 MG per tablet [Pharmacy Med Name: Lisinopril-hydroCHLOROthiazide 20-25 MG Oral Tablet] 90 tablet 0     Sig: Take 1 tablet by mouth once daily    metFORMIN (GLUCOPHAGE) 500 MG tablet [Pharmacy Med Name: metFORMIN HCl 500 MG Oral Tablet] 180 tablet 0     Sig: TAKE 2 TABLETS BY MOUTH ONCE DAILY WITH BREAKFAST       Last Appointment Date: 11/23/2021  Next Appointment Date: 9/16/2022    Allergies   Allergen Reactions    Gabapentin      GI side effects    Seasonal Other (See Comments)     RUNNY NOSE, WATERY EYES

## 2022-07-09 ENCOUNTER — HOSPITAL ENCOUNTER (OUTPATIENT)
Dept: LAB | Age: 62
Discharge: HOME | End: 2022-07-09
Payer: COMMERCIAL

## 2022-07-09 DIAGNOSIS — I82.411 DEEP VENOUS THROMBOSIS OF RIGHT PROFUNDA FEMORIS VEIN (HCC): ICD-10-CM

## 2022-07-09 DIAGNOSIS — E34.9 HYPOTESTOSTERONISM: ICD-10-CM

## 2022-07-09 DIAGNOSIS — I26.99 OTHER ACUTE PULMONARY EMBOLISM WITHOUT ACUTE COR PULMONALE (HCC): ICD-10-CM

## 2022-07-09 DIAGNOSIS — E66.01 MORBID OBESITY (HCC): ICD-10-CM

## 2022-07-09 DIAGNOSIS — Z79.01 CHRONIC ANTICOAGULATION: ICD-10-CM

## 2022-07-09 LAB
ABSOLUTE EOS #: 0.14 K/UL (ref 0–0.44)
ABSOLUTE IMMATURE GRANULOCYTE: 0.04 K/UL (ref 0–0.3)
ABSOLUTE LYMPH #: 1.38 K/UL (ref 1.1–3.7)
ABSOLUTE MONO #: 0.47 K/UL (ref 0.1–1.2)
BASOPHILS # BLD: 1 % (ref 0–2)
BASOPHILS ABSOLUTE: 0.06 K/UL (ref 0–0.2)
D-DIMER QUANTITATIVE: 0.51 MG/L FEU (ref 0–0.59)
EOSINOPHILS RELATIVE PERCENT: 2 % (ref 1–4)
HCT VFR BLD CALC: 50 % (ref 40.7–50.3)
HEMOGLOBIN: 17 G/DL (ref 13–17)
IMMATURE GRANULOCYTES: 1 %
LYMPHOCYTES # BLD: 23 % (ref 24–43)
MCH RBC QN AUTO: 29.1 PG (ref 25.2–33.5)
MCHC RBC AUTO-ENTMCNC: 34 G/DL (ref 25.2–33.5)
MCV RBC AUTO: 85.5 FL (ref 82.6–102.9)
MONOCYTES # BLD: 8 % (ref 3–12)
NRBC AUTOMATED: 0 PER 100 WBC
PDW BLD-RTO: 13.2 % (ref 11.8–14.4)
PLATELET # BLD: 207 K/UL (ref 138–453)
PMV BLD AUTO: 9.1 FL (ref 8.1–13.5)
RBC # BLD: 5.85 M/UL (ref 4.21–5.77)
SEG NEUTROPHILS: 65 % (ref 36–65)
SEGMENTED NEUTROPHILS ABSOLUTE COUNT: 3.99 K/UL (ref 1.5–8.1)
WBC # BLD: 6.1 K/UL (ref 3.5–11.3)

## 2022-07-09 PROCEDURE — 36415 COLL VENOUS BLD VENIPUNCTURE: CPT

## 2022-07-09 PROCEDURE — 85025 COMPLETE CBC W/AUTO DIFF WBC: CPT

## 2022-07-09 PROCEDURE — 85379 FIBRIN DEGRADATION QUANT: CPT

## 2022-07-27 ENCOUNTER — INITIAL CONSULT (OUTPATIENT)
Dept: SURGERY | Age: 62
End: 2022-07-27
Payer: COMMERCIAL

## 2022-07-27 VITALS
DIASTOLIC BLOOD PRESSURE: 80 MMHG | SYSTOLIC BLOOD PRESSURE: 138 MMHG | BODY MASS INDEX: 40.74 KG/M2 | HEIGHT: 71 IN | TEMPERATURE: 98.6 F | HEART RATE: 86 BPM | OXYGEN SATURATION: 95 % | WEIGHT: 291 LBS

## 2022-07-27 DIAGNOSIS — K64.1 GRADE II HEMORRHOIDS: ICD-10-CM

## 2022-07-27 DIAGNOSIS — R22.1 MASS OF LEFT SIDE OF NECK: Primary | ICD-10-CM

## 2022-07-27 PROCEDURE — 99214 OFFICE O/P EST MOD 30 MIN: CPT | Performed by: SURGERY

## 2022-07-27 RX ORDER — NAPROXEN 500 MG/1
TABLET ORAL
COMMUNITY
Start: 2022-07-20

## 2022-07-27 NOTE — PROGRESS NOTES
Patient is referred from his rheumatologist for evaluation of a lump in his neck. About 2 years ago apparently a lump was noted in the left side of his posterior neck. Is been followed ever since then and has persisted. He was unaware of it until it was pointed out to him and as he says it could have been present for quite a long time and he just never noticed it. It is not painful. He has not noticed it changing at all since it was first noticed. He had an ultrasound in September 2020 and then again in May 2022. Establishing stability of the lump over a period of a year and a half or more. The images are most consistent with a lipoma although could be a lymph node. Some concern about calcification but I did not see anything to date to make me think this was calcified. Apparently as part of guarding and new medication there was concern to be sure that this was not a lymphoma. Patient also complains of some symptomatic hemorrhoids. We did a colonoscopy on him in December and at that time he was complaining of some symptoms from hemorrhoids and rectal bleeding as well. He states he feels sometimes as if his anal perianal area is swollen up after he has a bowel movement. He does have a little bit of bright red bleeding from time to time.     Past Medical History:   Diagnosis Date    Antiphospholipid syndrome ([address]     Cervical disc disease     Depression     Diabetes mellitus ([address]     PREDIABETIC    Erectile dysfunction     Hepatitis C     tx 6mon completed 8/913 Dr Justin Leo TREATMENT    History of gastroesophageal reflux (GERD)     Hypertension     Hypotestosteronism     Impaired fasting glucose     Laceration 05/06/2017    TOP OF HEAD    Neck pain     Obesity     Sleep apnea     CPAP MACHINE    Substance abuse in remission ([address]     IV DRUG USER 30 YEARS AGO    Substance abuse in remission [facility])     ALCOHOLIC 20 YEARS    Testicular hypofunction 2015    Wears glasses      Past Surgical History:   Procedure Laterality Date    CERVICAL FUSION  05/09/2017    C5-7    CERVICAL FUSION N/A 5/9/2017     ANTERIOR CERVICAL DECOMPRESSION FUSION C5-C7 performed by Danuta Marie MD at [address] [facility]  06/18/2014    polyps    COLONOSCOPY  9/2015    sigmoid diverticulosis    COLONOSCOPY N/A 12/22/2021    COLONOSCOPY WITH BIOPSY performed by Sheryl Parra MD at [address] [place] Left     2ND  AND 3RD TOE DR [facility] 12/20/12    LIVER BIOPSY      TYMPANIC MEMBRANE REPAIR      MULTIPLE AS A CHILD     Current Outpatient Medications   Medication Sig Dispense Refill    naproxen (NAPROSYN) 500 MG tablet TAKE 1 TABLET BY MOUTH EVERY 12 HOURS AS NEEDED      atorvastatin (LIPITOR) 20 MG tablet Take 1 tablet by mouth once daily 90 tablet 0    lisinopril-hydroCHLOROthiazide (PRINZIDE;ZESTORETIC) 20-25 MG per tablet Take 1 tablet by mouth once daily 90 tablet 0    metFORMIN (GLUCOPHAGE) 500 MG tablet TAKE 2 TABLETS BY MOUTH ONCE DAILY WITH BREAKFAST 180 tablet 0    tadalafil (CIALIS) 5 MG tablet TAKE 1 TABLET BY MOUTH ONCE DAILY AS NEEDED FOR ERECTILE DYSFUNCTION 90 tablet 0    Testosterone 1.62 % GEL APPLY 4 PUMP ACTUATIONS TOPICALLY ONCE DAILY 150 g 2    buPROPion (WELLBUTRIN XL) 150 MG extended release tablet TAKE 1 TABLET BY MOUTH ONCE DAILY IN THE MORNING 30 tablet 2    hydroxychloroquine (PLAQUENIL) 200 MG tablet TAKE 2 TABLETS BY MOUTH ONCE DAILY      sildenafil (VIAGRA) 50 MG tablet TAKE 1 TABLET BY MOUTH AS NEEDED FOR  ERECTILE DYSFUNCTION 30 tablet 1    therapeutic multivitamin-minerals (THERAGRAN-M) tablet Take 1 tablet by mouth daily. XARELTO 20 MG TABS tablet Take 1 tablet by mouth once daily with breakfast (Patient not taking: Reported on 7/27/2022) 30 tablet 1    fluticasone (FLONASE) 50 MCG/ACT nasal spray 1 spray by Each Nostril route 2 times daily (Patient not taking: Reported on 7/27/2022) 16 g 0     No current facility-administered medications for this visit.      Allergies Allergen Reactions    Gabapentin      GI side effects    Seasonal Other (See Comments)     RUNNY NOSE, WATERY EYES     Social History     Tobacco Use    Smoking status: Former     Packs/day: 1.50     Years: 9.00     Pack years: 13.50     Types: Cigarettes     Quit date: 1992     Years since quittin.9    Smokeless tobacco: Former     Quit date: 1990    Tobacco comments:     Michelle Beau RRT 19   Vaping Use    Vaping Use: Never used   Substance Use Topics    Alcohol use: No     Alcohol/week: 0.0 standard drinks     Comment: HISTORY OF ALCOHOL ABUSE 30 YEARS AGO quit     Drug use: No     Types: IV     Comment: HISTORY OF IV DRUG USE 30 YEARS AGO     Family History   Problem Relation Age of Onset    High Blood Pressure Mother     Diabetes Mother     Cataracts Mother     Cancer Father         Unknown primary. May have been colon cancer    Stroke Father     Diabetes Sister     Other Brother         HEPATITIS C    Diabetes Brother     Glaucoma Neg Hx      /80 (Site: Left Upper Arm, Position: Sitting, Cuff Size: Large Adult)   Pulse 86   Temp 98.6 °F (37 °C) (Tympanic)   Ht 5' 11\" (1.803 m)   Wt 291 lb (132 kg)   SpO2 95%   BMI 40.59 kg/m²     Physical Exam  Neck:        Comments: No additional masses or nodes noted. Genitourinary:     Prostate: Not enlarged, not tender and no nodules present. Rectum: Guaiac result negative. External hemorrhoid and internal hemorrhoid present. No mass, tenderness or anal fissure. Normal anal tone. Comments: Hemorrhoids do not appear very large at this time. Maybe a little irritation posteriorly. Did not elicit prolapse. No bleeding today. No fissure, fistula, abscess, dermatitis. Musculoskeletal:      Cervical back: Full passive range of motion without pain, normal range of motion and neck supple. No rigidity. Normal range of motion. Ultrasound reports and images from  and May 2022 reviewed.     IMP/PLAN  1) Mass Left side of neck - likely a lipoma  - Ultrasound documented stability  - Not suspicious for malignancy  - Excision would be very low risk, although continued observation is reasonable. 2) Grade II hemorrhoids. - He has more symptoms than one might think based on the exam.  Discussed his options:    Keya Goodwin has symptomatic hemorrhoids as discussed. Treatment options were reviewed in detail including:    Continuing medical therapy: Fiber, water, stool softeners, topical medications, ice pack an sitz baths. Infrared Coagulation: Works better on smaller internal hemorrhoid. Risk of recurrence and bleeding    Rubber Banding: Useable for internal hemorrhoids. Risk of recurrence, bleeding and pain    Hemorrhoidectomy: Addresses both internal and external component. Lower risk of recurrence. Risk of significant post procedure pain, severe infections, anal stenosis. - Given the size of his hemorrhoids, I recommend not starting with this treatment. Patient wants to proceed with continuing medical therapy at this time.

## 2022-07-28 ENCOUNTER — OFFICE VISIT (OUTPATIENT)
Dept: OPTOMETRY | Age: 62
End: 2022-07-28
Payer: COMMERCIAL

## 2022-07-28 DIAGNOSIS — E11.9 DIABETES MELLITUS TYPE 2, NONINSULIN DEPENDENT (HCC): Primary | ICD-10-CM

## 2022-07-28 DIAGNOSIS — H25.813 COMBINED FORMS OF AGE-RELATED CATARACT OF BOTH EYES: ICD-10-CM

## 2022-07-28 DIAGNOSIS — H52.203 MYOPIA OF BOTH EYES WITH ASTIGMATISM AND PRESBYOPIA: ICD-10-CM

## 2022-07-28 DIAGNOSIS — H52.13 MYOPIA OF BOTH EYES WITH ASTIGMATISM AND PRESBYOPIA: ICD-10-CM

## 2022-07-28 DIAGNOSIS — H52.4 MYOPIA OF BOTH EYES WITH ASTIGMATISM AND PRESBYOPIA: ICD-10-CM

## 2022-07-28 DIAGNOSIS — H53.8 BLURRED VISION, BILATERAL: ICD-10-CM

## 2022-07-28 PROCEDURE — 99214 OFFICE O/P EST MOD 30 MIN: CPT | Performed by: OPTOMETRIST

## 2022-07-28 PROCEDURE — 92250 FUNDUS PHOTOGRAPHY W/I&R: CPT | Performed by: OPTOMETRIST

## 2022-07-28 PROCEDURE — 3044F HG A1C LEVEL LT 7.0%: CPT | Performed by: OPTOMETRIST

## 2022-07-28 RX ORDER — TROPICAMIDE 10 MG/ML
1 SOLUTION/ DROPS OPHTHALMIC ONCE
Status: COMPLETED | OUTPATIENT
Start: 2022-07-28 | End: 2022-07-28

## 2022-07-28 RX ADMIN — TROPICAMIDE 1 DROP: 10 SOLUTION/ DROPS OPHTHALMIC at 09:12

## 2022-07-28 ASSESSMENT — VISUAL ACUITY
METHOD: SNELLEN - LINEAR
OD_CC: 20/20 OU
OS_CC: 20/25
OD_CC+: -1
CORRECTION_TYPE: GLASSES
OD_PH_CC: 20/20 OU

## 2022-07-28 ASSESSMENT — REFRACTION_WEARINGRX
OD_AXIS: 103
OS_CYLINDER: -2.00
OD_CYLINDER: -1.75
OS_ADD: +2.25
OD_SPHERE: -0.75
OS_SPHERE: -0.75
SPECS_TYPE: PAL
OD_ADD: +2.25
OS_AXIS: 062

## 2022-07-28 ASSESSMENT — TONOMETRY
OS_IOP_MMHG: 18
OD_IOP_MMHG: 19
IOP_METHOD: NON-CONTACT AIR PUFF

## 2022-07-28 ASSESSMENT — SLIT LAMP EXAM - LIDS
COMMENTS: NORMAL
COMMENTS: NORMAL

## 2022-07-28 ASSESSMENT — ENCOUNTER SYMPTOMS
GASTROINTESTINAL NEGATIVE: 0
RESPIRATORY NEGATIVE: 0
EYES NEGATIVE: 0
ALLERGIC/IMMUNOLOGIC NEGATIVE: 0

## 2022-07-28 NOTE — PROGRESS NOTES
Ernestina Cash presents today for   Chief Complaint   Patient presents with    Ophth Diabetic Exam    Spots and/or Floaters     Last Vision Exam: 11/23/2021  Last Ophthalmology Exam: Saw  Dr. Bert Hills  no cataract surgery 2/2022  Last Filled Glasses Rx: 2022 ; march ([facility])   Insurance:  [employer]  Update: Exam   Diabetic:  Sugars:    HmgA1C: 6.2 3/12/2022   . HPI       Spots and/or Floaters              Comments: Last Vision Exam: 11/23/2021  Last Ophthalmology Exam: Saw  Dr. Bert Hills  no cataract surgery 2/2022  Last Filled Glasses Rx: 2022 ; march ([facility])   Insurance:  [employer] Savings Program  Update: Exam   Diabetic:  Sugars:    HmgA1C: 6.2 3/12/2022                     Current Outpatient Medications   Medication Sig Dispense Refill    naproxen (NAPROSYN) 500 MG tablet TAKE 1 TABLET BY MOUTH EVERY 12 HOURS AS NEEDED      atorvastatin (LIPITOR) 20 MG tablet Take 1 tablet by mouth once daily 90 tablet 0    lisinopril-hydroCHLOROthiazide (PRINZIDE;ZESTORETIC) 20-25 MG per tablet Take 1 tablet by mouth once daily 90 tablet 0    metFORMIN (GLUCOPHAGE) 500 MG tablet TAKE 2 TABLETS BY MOUTH ONCE DAILY WITH BREAKFAST 180 tablet 0    tadalafil (CIALIS) 5 MG tablet TAKE 1 TABLET BY MOUTH ONCE DAILY AS NEEDED FOR ERECTILE DYSFUNCTION 90 tablet 0    Testosterone 1.62 % GEL APPLY 4 PUMP ACTUATIONS TOPICALLY ONCE DAILY 150 g 2    XARELTO 20 MG TABS tablet Take 1 tablet by mouth once daily with breakfast 30 tablet 1    fluticasone (FLONASE) 50 MCG/ACT nasal spray 1 spray by Each Nostril route 2 times daily 16 g 0    buPROPion (WELLBUTRIN XL) 150 MG extended release tablet TAKE 1 TABLET BY MOUTH ONCE DAILY IN THE MORNING 30 tablet 2    hydroxychloroquine (PLAQUENIL) 200 MG tablet TAKE 2 TABLETS BY MOUTH ONCE DAILY      sildenafil (VIAGRA) 50 MG tablet TAKE 1 TABLET BY MOUTH AS NEEDED FOR  ERECTILE DYSFUNCTION 30 tablet 1    therapeutic multivitamin-minerals (THERAGRAN-M) tablet Take 1 tablet by mouth daily.        No current facility-administered medications for this visit. ROS    Positive for: Endocrine  Negative for: Constitutional, Gastrointestinal, Neurological, Skin, Genitourinary, Musculoskeletal, HENT, Cardiovascular, Eyes, Respiratory, Psychiatric, Allergic/Imm, Heme/Lymph         Family History   Problem Relation Age of Onset    High Blood Pressure Mother     Diabetes Mother     Cataracts Mother     Cancer Father         Unknown primary.   May have been colon cancer    Stroke Father     Diabetes Sister     Other Brother         HEPATITIS C    Diabetes Brother     Glaucoma Neg Hx      Social History     Socioeconomic History    Marital status:    Tobacco Use    Smoking status: Former     Packs/day: 1.50     Years: 9.00     Pack years: 13.50     Types: Cigarettes     Quit date: 1992     Years since quittin.9    Smokeless tobacco: Former     Quit date: 1990    Tobacco comments:     Maxcine Fill RRT 19   Vaping Use    Vaping Use: Never used   Substance and Sexual Activity    Alcohol use: No     Alcohol/week: 0.0 standard drinks     Comment: HISTORY OF ALCOHOL ABUSE 30 YEARS AGO quit     Drug use: No     Types: IV     Comment: HISTORY OF IV DRUG USE 30 YEARS AGO       Past Medical History:   Diagnosis Date    Antiphospholipid syndrome ([address]     Cervical disc disease     Depression     Diabetes mellitus ([address]     PREDIABETIC    Erectile dysfunction     Hepatitis C     tx 6mon completed  Dr Hawthorne Frames TREATMENT    History of gastroesophageal reflux (GERD)     Hypertension     Hypotestosteronism     Impaired fasting glucose     Laceration 2017    TOP OF HEAD    Neck pain     Obesity     Sleep apnea     CPAP MACHINE    Substance abuse in remission ([address]     IV DRUG USER 30 YEARS AGO    Substance abuse in remission ([address]     ALCOHOLIC 20 YEARS    Testicular hypofunction     Wears glasses          Main Ophthalmology Exam       External Exam         Right Left    External Normal Normal Slit Lamp Exam         Right Left    Lids/Lashes Normal Normal    Conjunctiva/Sclera White and quiet White and quiet    Cornea Clear Clear    Anterior Chamber Deep and quiet Deep and quiet    Iris Round and reactive Round and reactive    Lens Trace Nuclear sclerosis 1+ Nuclear sclerosis, Posterior subcapsular cataract    Vitreous Normal Central vitreous floaters              Fundus Exam         Right Left    Disc Normal Normal    C/D Ratio 0.25 0.25    Macula Normal;   normal     Vessels Normal Normal    Periphery Normal Normal                   <div id=\"MAIN_EXAM_REVIEWED\"></div>     Tonometry       Tonometry (Non-contact air puff, 9:24 AM)         Right Left    Pressure 19 18   IOP.9             17.2  CH:  9.5          9.8  WS: 8.1          7.6                   Visual Acuity (Snellen - Linear)         Right Left    Dist cc 20/20 -1 20/25    Dist ph cc 20/20 OU     Near cc 20/20 OU       Correction: Glasses          Not recorded       Not recorded       Not recorded       Not recorded         Ophthalmology Exam       Wearing Rx         Sphere Cylinder Axis Add    Right -0.75 -1.75 103 +2.25    Left -0.75 -2.00 062 +2.25      Type: PAL                    Manifest Refraction       Manifest Refraction #2 (Auto)         Sphere Cylinder Axis    Right -0.75 -1.75 099    Left -0.50 -1.75 068                       Not recorded         Orders Placed This Encounter   Procedures    [facility] DIABETES EYE EXAM    Color Fundus Photography-OU-Both Eyes       IMPRESSION:  1. Diabetes mellitus type 2, noninsulin dependent ([address]    2. Blurred vision, bilateral    3. Myopia of both eyes with astigmatism and presbyopia    4. Combined forms of age-related cataract of both eyes        PLAN:    Discussed the patient's diagnosis of diabetes and the impact this can have on their ocular health, potentially even leading to permanent blindness.   I discussed with the patient the importance of continued follow-up and management with their primary care physician to control their glycemic, blood pressure, and lipid levels. The patient verbalized understanding. New glasses if desired  \"  Monitor for future progression and visual significance. Counseled patient that more glare may be noticed and more light may be needed for reading. Glycemic control as per PCP   There are no Patient Instructions on file for this visit. Return in about 1 year (around 7/28/2023).

## 2022-08-02 ENCOUNTER — OFFICE VISIT (OUTPATIENT)
Dept: ONCOLOGY | Age: 62
End: 2022-08-02
Payer: COMMERCIAL

## 2022-08-02 VITALS
BODY MASS INDEX: 40.6 KG/M2 | SYSTOLIC BLOOD PRESSURE: 132 MMHG | HEART RATE: 92 BPM | HEIGHT: 71 IN | WEIGHT: 290 LBS | TEMPERATURE: 98.3 F | OXYGEN SATURATION: 94 % | DIASTOLIC BLOOD PRESSURE: 74 MMHG

## 2022-08-02 DIAGNOSIS — I26.99 OTHER ACUTE PULMONARY EMBOLISM WITHOUT ACUTE COR PULMONALE (HCC): Primary | ICD-10-CM

## 2022-08-02 PROCEDURE — 99213 OFFICE O/P EST LOW 20 MIN: CPT | Performed by: INTERNAL MEDICINE

## 2022-08-02 NOTE — PROGRESS NOTES
Greta Mcdaniel                                                                                                                  8/2/2022  MRN:   [id number]  YOB: 1960  PCP:                           SHAKIRA Fam CNP  Referring Physician: Richard Ghosh  Treating Physician Name: Ole Baird MD      Reason for visit:  Chief Complaint   Patient presents with    Follow-up     History of pulmonary embolism        Current problems/ Active and recent treatments:  Right femoral, popliteal and gastrocnemius DVT  Segmental and subsegmental pulmonary embolism. Hypogonadism on testosterone iron supplementation  Arthritis    Interval history:     Patient presents to the clinic for a follow-up visit. Patient is now off anticoagulation. Continues to be on testosterone supplement. Hemoglobin is within range. Patient joint pain is better with naproxen. Patient is taking aspirin. During this visit patient's allergy, social, medical, surgical history and medications were reviewed and updated. Summary of Case/History:    Greta Mcdaniel a 63 y.o.male presents to the office to establish care and for further evaluation treatment recommendation regarding anticoagulation for patient's acute DVT and pulmonary embolism  Patient started noticing swelling of his right lower extremity back in July 2019. Work-up including ultrasound of lower extremity confirmed right lower extremity DVT involving femoral popliteal and gastrocnemius vein. Additional work-up including CT chest showed segmental and subsegmental very embolism. Patient was started on anticoagulation and discharged home on Xarelto. Patient recalls any surgery travel trauma before the pulmonary embolism and DVT. Patient does not smoke. He does not give any personal history of previous venous thrombi embolism. Does not give any family history of venous thrombi embolism. Patient is on testosterone supplementation. His BMI is 39.4. Patient has been tolerating anticoagulation without any complications or severe side effects. Past Medical History:   Past Medical History:   Diagnosis Date    Antiphospholipid syndrome ([facility]     Cervical disc disease     Depression     Diabetes mellitus ([facility])     PREDIABETIC    Erectile dysfunction     Hepatitis C     tx 6mon completed  Dr Sohail Mei TREATMENT    History of gastroesophageal reflux (GERD)     Hypertension     Hypotestosteronism     Impaired fasting glucose     Laceration 2017    TOP OF HEAD    Neck pain     Obesity     Sleep apnea     CPAP MACHINE    Substance abuse in remission ([facility]     IV DRUG USER 30 YEARS AGO    Substance abuse in remission ([facility]     ALCOHOLIC 20 YEARS    Testicular hypofunction     Wears glasses        Past Surgical History:     Past Surgical History:   Procedure Laterality Date    CERVICAL FUSION  2017    C5-7    CERVICAL FUSION N/A 2017     ANTERIOR CERVICAL DECOMPRESSION FUSION C5-C7 performed by Meena Leal MD at [address] [place] [address]  2014    polyps    COLONOSCOPY  2015    sigmoid diverticulosis    COLONOSCOPY N/A 2021    COLONOSCOPY WITH BIOPSY performed by Apple Ray MD at [address] [place] [address] [place] Left     2ND  AND 3RD TOE  [facility] 12    LIVER BIOPSY      TYMPANIC MEMBRANE REPAIR      MULTIPLE AS A CHILD       Patient Family Social History:     Social History     Socioeconomic History    Marital status:      Spouse name: None    Number of children: None    Years of education: None    Highest education level: None   Tobacco Use    Smoking status: Former     Packs/day: 1.50     Years: 9.00     Pack years: 13.50     Types: Cigarettes     Quit date: 1992     Years since quittin.9    Smokeless tobacco: Former     Quit date: 1990    Tobacco comments:     [facility]   Vaping Use    Vaping Use: Never used   Substance and Sexual Activity    Alcohol use:  No Alcohol/week: 0.0 standard drinks     Comment: HISTORY OF ALCOHOL ABUSE 30 YEARS AGO quit 1989    Drug use: No     Types: IV     Comment: HISTORY OF IV DRUG USE 30 YEARS AGO     Family History   Problem Relation Age of Onset    High Blood Pressure Mother     Diabetes Mother     Cataracts Mother     Cancer Father         Unknown primary. May have been colon cancer    Stroke Father     Diabetes Sister     Other Brother         HEPATITIS C    Diabetes Brother     Glaucoma Neg Hx        Current Medications:     Current Outpatient Medications   Medication Sig Dispense Refill    naproxen (NAPROSYN) 500 MG tablet TAKE 1 TABLET BY MOUTH EVERY 12 HOURS AS NEEDED      atorvastatin (LIPITOR) 20 MG tablet Take 1 tablet by mouth once daily 90 tablet 0    lisinopril-hydroCHLOROthiazide (PRINZIDE;ZESTORETIC) 20-25 MG per tablet Take 1 tablet by mouth once daily 90 tablet 0    metFORMIN (GLUCOPHAGE) 500 MG tablet TAKE 2 TABLETS BY MOUTH ONCE DAILY WITH BREAKFAST 180 tablet 0    tadalafil (CIALIS) 5 MG tablet TAKE 1 TABLET BY MOUTH ONCE DAILY AS NEEDED FOR ERECTILE DYSFUNCTION 90 tablet 0    Testosterone 1.62 % GEL APPLY 4 PUMP ACTUATIONS TOPICALLY ONCE DAILY 150 g 2    fluticasone (FLONASE) 50 MCG/ACT nasal spray 1 spray by Each Nostril route 2 times daily 16 g 0    buPROPion (WELLBUTRIN XL) 150 MG extended release tablet TAKE 1 TABLET BY MOUTH ONCE DAILY IN THE MORNING 30 tablet 2    hydroxychloroquine (PLAQUENIL) 200 MG tablet TAKE 2 TABLETS BY MOUTH ONCE DAILY      sildenafil (VIAGRA) 50 MG tablet TAKE 1 TABLET BY MOUTH AS NEEDED FOR  ERECTILE DYSFUNCTION 30 tablet 1    therapeutic multivitamin-minerals (THERAGRAN-M) tablet Take 1 tablet by mouth daily. XARELTO 20 MG TABS tablet Take 1 tablet by mouth once daily with breakfast (Patient not taking: Reported on 8/2/2022) 30 tablet 1     No current facility-administered medications for this visit.        Allergies:   Gabapentin and Seasonal    Review of Systems: Constitutional: No fever or chills. No night sweats, no weight loss   Eyes: No eye discharge, double vision, or eye pain   HEENT: negative for sore mouth, sore throat, hoarseness and voice change   Respiratory: negative for cough , sputum, dyspnea, wheezing, hemoptysis, chest pain   Cardiovascular: negative for chest pain, dyspnea, palpitations, orthopnea, PND   Gastrointestinal: negative for nausea, vomiting, diarrhea, constipation, abdominal pain, Dysphagia, hematemesis and hematochezia   Genitourinary: negative for frequency, dysuria, nocturia, urinary incontinence, and hematuria   Integument: negative for rash, skin lesions, bruises.    Hematologic/Lymphatic: negative for easy bruising, bleeding, lymphadenopathy, or petechiae   Endocrine: negative for heat or cold intolerance,weight changes, change in bowel habits and hair loss   Musculoskeletal: Positive joint pain  Neurological: negative for headaches, dizziness, seizures, weakness, numbness        Physical Exam:    Vitals: /74 (Site: Right Upper Arm, Position: Sitting, Cuff Size: Large Adult)   Pulse 92   Temp 98.3 °F (36.8 °C)   Ht 5' 11\" (1.803 m)   Wt 290 lb (131.5 kg)   SpO2 94%   BMI 40.45 kg/m²   General appearance - well appearing, no in pain or distress  Mental status - AAO X3  Eyes - pupils equal and reactive, extraocular eye movements intact  Mouth - mucous membranes moist, pharynx normal without lesions  Neck - supple, no significant adenopathy  Lymphatics - no palpable lymphadenopathy, no hepatosplenomegaly  Chest - clear to auscultation, no wheezes, rales or rhonchi, symmetric air entry  Heart - normal rate, regular rhythm, normal S1, S2, no murmurs  Abdomen - soft, nontender, nondistended, no masses or organomegaly  Neurological - alert, oriented, normal speech, no focal findings or movement disorder noted  Extremities - peripheral pulses normal, no pedal edema, no clubbing or cyanosis  Skin - normal coloration and turgor, no thrombus. Residual thrombus is noted within the distal femoral, popliteal and posterior tibial veins with evidence of recanalized flow. No evidence of acute DVT in the right lower extremity with chronic/residual thrombosis as described. Impression:  Right lower extremity DVT involving femoral popliteal and gastrocnemius vein. Right segmental and subsegmental pulmonary embolism. Testosterone supplementation  BMI of 41.1  Hypertension  Depression    Plan:  Personally reviewed results of lab work-up and other relevant clinical data. Patient thrombophilia work-up including factor V Leyden and prothrombin gene mutation were negative. Will order functional test to complete thrombophilia work-up including protein C protein S and Antithrombin III testing    D-dimer is within range. Patient's  risk factors include male sex obesity testosterone and advancing age. Patient is not willing to go off the testosterone supplements. He is working on weight loss    I discussed risk and benefit of discontinuation of anticoagulation. Patient wishes to go off anticoagulation if possible. I explained to the patient that he still has some risk factors that can lead to recurrent VTE. Patient expressed understanding. He also wishes to continue testosterone. Continue aspirin. We will continue check D-dimer periodically    Patient had multiple questions which answered to the best of my ability. Gen Lizama MD        This note is created with the assistance of a speech recognition program.  While intending to generate a document that actually reflects the content of the visit, the document can still have some errors including those of syntax and sound a like substitutions which may escape proof reading. It such instances, actual meaning can be extrapolated by contextual diversion.

## 2022-08-09 DIAGNOSIS — N52.9 ERECTILE DYSFUNCTION, UNSPECIFIED ERECTILE DYSFUNCTION TYPE: ICD-10-CM

## 2022-08-11 RX ORDER — TADALAFIL 5 MG/1
TABLET ORAL
Qty: 90 TABLET | Refills: 1 | Status: SHIPPED | OUTPATIENT
Start: 2022-08-11 | End: 2022-09-16 | Stop reason: ALTCHOICE

## 2022-09-10 DIAGNOSIS — E78.2 MIXED HYPERLIPIDEMIA: ICD-10-CM

## 2022-09-12 RX ORDER — ATORVASTATIN CALCIUM 20 MG/1
TABLET, FILM COATED ORAL
Qty: 90 TABLET | Refills: 1 | Status: SHIPPED | OUTPATIENT
Start: 2022-09-12

## 2022-09-12 NOTE — TELEPHONE ENCOUNTER
Farhana Many called requesting a refill of the below medication which has been pended for you:     Requested Prescriptions     Pending Prescriptions Disp Refills    atorvastatin (LIPITOR) 20 MG tablet [Pharmacy Med Name: Atorvastatin Calcium 20 MG Oral Tablet] 90 tablet 0     Sig: Take 1 tablet by mouth once daily       Last Appointment Date: 11/23/2021  Next Appointment Date: 9/16/2022    Allergies   Allergen Reactions    Gabapentin      GI side effects    Seasonal Other (See Comments)     RUNNY NOSE, WATERY EYES

## 2022-09-16 ENCOUNTER — OFFICE VISIT (OUTPATIENT)
Dept: FAMILY MEDICINE CLINIC | Age: 62
End: 2022-09-16
Payer: COMMERCIAL

## 2022-09-16 VITALS
SYSTOLIC BLOOD PRESSURE: 144 MMHG | BODY MASS INDEX: 40.77 KG/M2 | HEART RATE: 95 BPM | HEIGHT: 71 IN | OXYGEN SATURATION: 98 % | RESPIRATION RATE: 18 BRPM | DIASTOLIC BLOOD PRESSURE: 80 MMHG | WEIGHT: 291.2 LBS

## 2022-09-16 DIAGNOSIS — F32.A DEPRESSION, UNSPECIFIED DEPRESSION TYPE: ICD-10-CM

## 2022-09-16 DIAGNOSIS — E78.2 MIXED HYPERLIPIDEMIA: ICD-10-CM

## 2022-09-16 DIAGNOSIS — D68.61 ANTIPHOSPHOLIPID SYNDROME (HCC): ICD-10-CM

## 2022-09-16 DIAGNOSIS — E66.01 MORBID OBESITY (HCC): ICD-10-CM

## 2022-09-16 DIAGNOSIS — N52.9 ERECTILE DYSFUNCTION, UNSPECIFIED ERECTILE DYSFUNCTION TYPE: ICD-10-CM

## 2022-09-16 DIAGNOSIS — R73.01 IMPAIRED FASTING GLUCOSE: Primary | ICD-10-CM

## 2022-09-16 DIAGNOSIS — E34.9 HYPOTESTOSTERONISM: ICD-10-CM

## 2022-09-16 DIAGNOSIS — H91.93 BILATERAL HEARING LOSS, UNSPECIFIED HEARING LOSS TYPE: ICD-10-CM

## 2022-09-16 DIAGNOSIS — I10 ESSENTIAL HYPERTENSION: ICD-10-CM

## 2022-09-16 PROCEDURE — 99214 OFFICE O/P EST MOD 30 MIN: CPT | Performed by: NURSE PRACTITIONER

## 2022-09-16 RX ORDER — SILDENAFIL 50 MG/1
TABLET, FILM COATED ORAL
Qty: 30 TABLET | Refills: 3 | Status: SHIPPED | OUTPATIENT
Start: 2022-09-16

## 2022-09-16 SDOH — ECONOMIC STABILITY: FOOD INSECURITY: WITHIN THE PAST 12 MONTHS, YOU WORRIED THAT YOUR FOOD WOULD RUN OUT BEFORE YOU GOT MONEY TO BUY MORE.: PATIENT DECLINED

## 2022-09-16 SDOH — ECONOMIC STABILITY: FOOD INSECURITY: WITHIN THE PAST 12 MONTHS, THE FOOD YOU BOUGHT JUST DIDN'T LAST AND YOU DIDN'T HAVE MONEY TO GET MORE.: PATIENT DECLINED

## 2022-09-16 ASSESSMENT — SOCIAL DETERMINANTS OF HEALTH (SDOH): HOW HARD IS IT FOR YOU TO PAY FOR THE VERY BASICS LIKE FOOD, HOUSING, MEDICAL CARE, AND HEATING?: PATIENT DECLINED

## 2022-09-16 NOTE — PROGRESS NOTES
[address]  [address] [facility], [id number]  [phone]      HPI:   Pt presents to the clinic for a 6 month follow up of chronic medical conditions. He has a history of IFG. He is due for labs. He does not check his blood sugar. He does take 1000mg of metformin daily with breakfast. He does not follow a healthy diet and does not exercise routinely. He has struggled with bilateral hearing loss. He has failed a hearing screen in the past. He is requesting a refer to audiology. He has a history of depression. He is on wellbutrin which helps control his depression. He denies thoughts of suicide or homicide. He feels that he is doing ok on the medication. He has a history of hypotestosteronism and ED. He is on testosterone replacement. He is due for labs. He feels that it helps significantly. He is currently using cialis for the ED which does help however he would like to try viagra as it seems to work better if he goes back and forth between medication. He continues to follow with hematology for his history of a DVT. He has stopped the eliquis per the recommendation of hematology. He follows with rheumatology for antiphospholipid syndrome. He is taking naproxen once a day along with the plaquenil which helps significantly with his pain. He has no further concerns. Hypertension  This is a chronic problem. The current episode started more than 1 year ago. The problem is unchanged. The problem is uncontrolled. Pertinent negatives include no anxiety, chest pain, headaches, malaise/fatigue, palpitations, peripheral edema or shortness of breath. There are no associated agents to hypertension. Risk factors for coronary artery disease include dyslipidemia, male gender and obesity. Past treatments include ACE inhibitors and diuretics. The current treatment provides moderate improvement. Compliance problems include exercise and diet. Hyperlipidemia  This is a chronic problem.  The current episode started more than 1 year ago. Condition status: due for labs. Exacerbating diseases include obesity. Factors aggravating his hyperlipidemia include fatty foods and thiazides. Associated symptoms include myalgias. Pertinent negatives include no chest pain, leg pain or shortness of breath. Current antihyperlipidemic treatment includes statins. Compliance problems include adherence to diet and adherence to exercise. Risk factors for coronary artery disease include dyslipidemia, hypertension, male sex and obesity. Current Outpatient Medications   Medication Sig Dispense Refill    sildenafil (VIAGRA) 50 MG tablet TAKE 1 TABLET BY MOUTH AS NEEDED FOR  ERECTILE DYSFUNCTION 30 tablet 3    atorvastatin (LIPITOR) 20 MG tablet Take 1 tablet by mouth once daily 90 tablet 1    naproxen (NAPROSYN) 500 MG tablet TAKE 1 TABLET BY MOUTH EVERY 12 HOURS AS NEEDED      lisinopril-hydroCHLOROthiazide (PRINZIDE;ZESTORETIC) 20-25 MG per tablet Take 1 tablet by mouth once daily 90 tablet 0    metFORMIN (GLUCOPHAGE) 500 MG tablet TAKE 2 TABLETS BY MOUTH ONCE DAILY WITH BREAKFAST 180 tablet 0    Testosterone 1.62 % GEL APPLY 4 PUMP ACTUATIONS TOPICALLY ONCE DAILY 150 g 2    XARELTO 20 MG TABS tablet Take 1 tablet by mouth once daily with breakfast 30 tablet 1    fluticasone (FLONASE) 50 MCG/ACT nasal spray 1 spray by Each Nostril route 2 times daily 16 g 0    buPROPion (WELLBUTRIN XL) 150 MG extended release tablet TAKE 1 TABLET BY MOUTH ONCE DAILY IN THE MORNING 30 tablet 2    hydroxychloroquine (PLAQUENIL) 200 MG tablet TAKE 2 TABLETS BY MOUTH ONCE DAILY      therapeutic multivitamin-minerals (THERAGRAN-M) tablet Take 1 tablet by mouth daily. No current facility-administered medications for this visit.      Allergies   Allergen Reactions    Gabapentin      GI side effects    Seasonal Other (See Comments)     RUNNY NOSE, WATERY EYES       All patients pastmedical, surgical, social and family history has been reviewed. Subjective:      Review of Systems   Constitutional:  Negative for activity change, appetite change, fatigue, fever and malaise/fatigue. Respiratory:  Negative for cough, shortness of breath and wheezing. Cardiovascular:  Negative for chest pain and palpitations. Genitourinary:  Negative for testicular pain. +ED   Musculoskeletal:  Positive for arthralgias and myalgias. Neurological:  Negative for headaches. Psychiatric/Behavioral:          History of depression       Objective:      Physical Exam  Vitals and nursing note reviewed. Constitutional:       Appearance: Normal appearance. He is obese. HENT:      Head: Normocephalic and atraumatic. Cardiovascular:      Rate and Rhythm: Normal rate and regular rhythm. Heart sounds: Normal heart sounds. Pulmonary:      Effort: Pulmonary effort is normal.      Breath sounds: Normal breath sounds. Skin:     General: Skin is warm. Capillary Refill: Capillary refill takes less than 2 seconds. Neurological:      General: No focal deficit present. Mental Status: He is alert and oriented to person, place, and time. Psychiatric:         Mood and Affect: Mood normal.         Behavior: Behavior normal.         Thought Content: Thought content normal.         Judgment: Judgment normal.        Assessment:       Diagnosis Orders   1. Impaired fasting glucose  Microalbumin, Ur    Hemoglobin A1C      2. Essential hypertension  Comprehensive Metabolic Panel      3. Mixed hyperlipidemia  Lipid Panel    Comprehensive Metabolic Panel      4. Bilateral hearing loss, unspecified hearing loss type  External Referral To Audiology      5. Morbid obesity ([address]        6. Depression, unspecified depression type        7. Hypotestosteronism  Testosterone      8. Erectile dysfunction, unspecified erectile dysfunction type  sildenafil (VIAGRA) 50 MG tablet      9.  Antiphospholipid syndrome (HCC)            Plan:      IFG-due for labs, continue current dose of metformin  HTN-BP is a little elevated today. He is to monitor his BP at home and call if it is consistently over 140/80. Hyperlipidemia-due for labs continue current current medications  Bilateral hearing loss-referral to audiology  Morbid obesity-encouraged pt to work towards a healthy diet and increasing exercise  Depression-stable continue current dose of wellbutrin  Hypotestosteronism-due for labs   ED-will change to viagra 50mg as directed  Antiphospholipid syndrome-continue following with rheumatology  Pt to return in 6 months for follow up  Pt to return PRN   Return in about 6 months (around 3/16/2023), or if symptoms worsen or fail to improve. Orders Placed This Encounter   Procedures    Microalbumin, Ur     Standing Status:   Future     Standing Expiration Date:   9/16/2023    Hemoglobin A1C     Standing Status:   Future     Standing Expiration Date:   9/16/2023    Lipid Panel     Standing Status:   Future     Standing Expiration Date:   9/16/2023     Order Specific Question:   Is Patient Fasting?/# of Hours     Answer:   12    Comprehensive Metabolic Panel     Standing Status:   Future     Standing Expiration Date:   9/16/2023    Testosterone     Standing Status:   Future     Standing Expiration Date:   9/16/2023    External Referral To Audiology     Referral Priority:   Routine     Referral Type:   Eval and Treat     Referral Reason:   Specialty Services Required     Requested Specialty:   Audiology     Number of Visits Requested:   1     Orders Placed This Encounter   Medications    sildenafil (VIAGRA) 50 MG tablet     Sig: TAKE 1 TABLET BY MOUTH AS NEEDED FOR  ERECTILE DYSFUNCTION     Dispense:  30 tablet     Refill:  3       Patient given educational materials - see patient instructions. All patient questionsanswered. Pt voiced understanding. Reviewed health maintenance.      Electronically signed by SHAKIRA Talley - CNP, CNP on 9/19/2022 at 12:48 PM

## 2022-09-19 ASSESSMENT — ENCOUNTER SYMPTOMS
COUGH: 0
SHORTNESS OF BREATH: 0
WHEEZING: 0

## 2022-09-20 PROBLEM — Z86.0100 HISTORY OF COLON POLYPS: Chronic | Status: ACTIVE | Noted: 2021-12-07

## 2022-09-20 PROBLEM — Z86.010 HISTORY OF COLON POLYPS: Chronic | Status: ACTIVE | Noted: 2021-12-07

## 2022-09-20 PROBLEM — K64.9 HEMORRHOIDS: Chronic | Status: ACTIVE | Noted: 2021-12-07

## 2022-09-20 PROBLEM — D68.61 ANTIPHOSPHOLIPID SYNDROME (HCC): Chronic | Status: ACTIVE | Noted: 2021-12-07

## 2022-09-20 PROBLEM — G47.33 SLEEP APNEA, OBSTRUCTIVE: Status: ACTIVE | Noted: 2017-05-10

## 2022-09-20 PROBLEM — K62.5 RECTAL BLEEDING: Chronic | Status: ACTIVE | Noted: 2021-12-21

## 2022-09-20 PROBLEM — G95.9 CERVICAL MYELOPATHY (HCC): Chronic | Status: ACTIVE | Noted: 2017-02-28

## 2022-09-24 ENCOUNTER — HOSPITAL ENCOUNTER (OUTPATIENT)
Dept: LAB | Age: 62
Discharge: HOME | End: 2022-09-24
Payer: COMMERCIAL

## 2022-09-24 DIAGNOSIS — I10 ESSENTIAL HYPERTENSION: ICD-10-CM

## 2022-09-24 DIAGNOSIS — E34.9 HYPOTESTOSTERONISM: ICD-10-CM

## 2022-09-24 DIAGNOSIS — R73.01 IMPAIRED FASTING GLUCOSE: ICD-10-CM

## 2022-09-24 DIAGNOSIS — E78.2 MIXED HYPERLIPIDEMIA: ICD-10-CM

## 2022-09-24 LAB
ALBUMIN SERPL-MCNC: 4.4 G/DL (ref 3.5–5.2)
ALBUMIN/GLOBULIN RATIO: 1.5 (ref 1–2.5)
ALP BLD-CCNC: 94 U/L (ref 40–129)
ALT SERPL-CCNC: 40 U/L (ref 5–41)
ANION GAP SERPL CALCULATED.3IONS-SCNC: 11 MMOL/L (ref 9–17)
AST SERPL-CCNC: 28 U/L
BILIRUB SERPL-MCNC: 0.9 MG/DL (ref 0.3–1.2)
BUN BLDV-MCNC: 15 MG/DL (ref 8–23)
BUN/CREAT BLD: 18 (ref 9–20)
CALCIUM SERPL-MCNC: 9.4 MG/DL (ref 8.6–10.4)
CHLORIDE BLD-SCNC: 101 MMOL/L (ref 98–107)
CHOLESTEROL/HDL RATIO: 4
CHOLESTEROL: 95 MG/DL
CO2: 25 MMOL/L (ref 20–31)
CREAT SERPL-MCNC: 0.85 MG/DL (ref 0.7–1.2)
CREATININE URINE: 90.9 MG/DL (ref 39–259)
GFR AFRICAN AMERICAN: >60 ML/MIN
GFR NON-AFRICAN AMERICAN: >60 ML/MIN
GFR SERPL CREATININE-BSD FRML MDRD: ABNORMAL ML/MIN/{1.73_M2}
GLUCOSE BLD-MCNC: 155 MG/DL (ref 70–99)
HDLC SERPL-MCNC: 24 MG/DL
LDL CHOLESTEROL: 29 MG/DL (ref 0–130)
MICROALBUMIN/CREAT 24H UR: 18 MG/L
MICROALBUMIN/CREAT UR-RTO: 20 MCG/MG CREAT
POTASSIUM SERPL-SCNC: 4.5 MMOL/L (ref 3.7–5.3)
SODIUM BLD-SCNC: 137 MMOL/L (ref 135–144)
TESTOSTERONE TOTAL: 225 NG/DL (ref 220–1000)
TOTAL PROTEIN: 7.3 G/DL (ref 6.4–8.3)
TRIGL SERPL-MCNC: 211 MG/DL

## 2022-09-24 PROCEDURE — 36415 COLL VENOUS BLD VENIPUNCTURE: CPT

## 2022-09-24 PROCEDURE — 84403 ASSAY OF TOTAL TESTOSTERONE: CPT

## 2022-09-24 PROCEDURE — 82570 ASSAY OF URINE CREATININE: CPT

## 2022-09-24 PROCEDURE — 80053 COMPREHEN METABOLIC PANEL: CPT

## 2022-09-24 PROCEDURE — 80061 LIPID PANEL: CPT

## 2022-09-24 PROCEDURE — 83036 HEMOGLOBIN GLYCOSYLATED A1C: CPT

## 2022-09-24 PROCEDURE — 82043 UR ALBUMIN QUANTITATIVE: CPT

## 2022-09-25 LAB
ESTIMATED AVERAGE GLUCOSE: 137 MG/DL
HBA1C MFR BLD: 6.4 % (ref 4–6)

## 2022-10-05 ENCOUNTER — TELEPHONE (OUTPATIENT)
Dept: FAMILY MEDICINE CLINIC | Age: 62
End: 2022-10-05

## 2022-10-05 DIAGNOSIS — D22.9 CHANGE IN COLOR OF NEVUS: ICD-10-CM

## 2022-10-05 DIAGNOSIS — E78.2 MIXED HYPERLIPIDEMIA: Primary | ICD-10-CM

## 2022-10-05 RX ORDER — ATORVASTATIN CALCIUM 40 MG/1
40 TABLET, FILM COATED ORAL DAILY
Qty: 30 TABLET | Refills: 3 | Status: SHIPPED | OUTPATIENT
Start: 2022-10-05

## 2022-10-05 NOTE — TELEPHONE ENCOUNTER
Spoke with patient and advised patient of lab results and recommendations. Patient agrees with plan and would like script sent to [facility]. Patient states that he has a Mole on his eyelid and notices that it is changing and would like to see someone.  Referral placed for Makenzie Osullivan

## 2022-10-05 NOTE — TELEPHONE ENCOUNTER
----- Message from SHAKIRA Rodríguez CNP sent at 10/4/2022  4:04 PM EDT -----  Testosterone is WNL. Continue current treatment. Microalbumin is ok. CMP is stable. A1C is a little higher this time. Work on diet and exercise. His triglycerides continue   To elevated. Would like to increase his lipitor to 40mg daily. Recheck lipids and ast in 3 months.

## 2022-10-14 DIAGNOSIS — E34.9 HYPOTESTOSTERONISM: ICD-10-CM

## 2022-10-14 NOTE — TELEPHONE ENCOUNTER
Rachelle Issa called requesting a refill of the below medication which has been pended for you:     Requested Prescriptions     Pending Prescriptions Disp Refills    Testosterone 1.62 % GEL [Pharmacy Med Name: Testosterone 1.62 % Transdermal Gel] 150 g 0     Sig: APPLY 4 PUMP ACTUATIONS TOPICALLY ONCE DAILY       Last Appointment Date: 9/16/2022  Next Appointment Date: 3/16/2023    Allergies   Allergen Reactions    Gabapentin      GI side effects    Seasonal Other (See Comments)     RUNNY NOSE, WATERY EYES

## 2022-10-17 RX ORDER — TESTOSTERONE 20.25 MG/1.25G
GEL TOPICAL
Qty: 150 G | Refills: 1 | Status: SHIPPED | OUTPATIENT
Start: 2022-10-17

## 2022-10-17 NOTE — TELEPHONE ENCOUNTER
Medication refilled    Controlled Substance Monitoring:    Acute and Chronic Pain Monitoring:   RX Monitoring 10/17/2022   Attestation -   Periodic Controlled Substance Monitoring No signs of potential drug abuse or diversion identified.;Obtaining appropriate analgesic effect of treatment.

## 2022-10-29 ENCOUNTER — HOSPITAL ENCOUNTER (OUTPATIENT)
Dept: LAB | Age: 62
Discharge: HOME | End: 2022-10-29
Payer: COMMERCIAL

## 2022-10-29 LAB
ABSOLUTE EOS #: 0.15 K/UL (ref 0–0.44)
ABSOLUTE IMMATURE GRANULOCYTE: 0.04 K/UL (ref 0–0.3)
ABSOLUTE LYMPH #: 1.22 K/UL (ref 1.1–3.7)
ABSOLUTE MONO #: 0.46 K/UL (ref 0.1–1.2)
ALBUMIN SERPL-MCNC: 4.4 G/DL (ref 3.5–5.2)
ALP BLD-CCNC: 95 U/L (ref 40–129)
ALT SERPL-CCNC: 38 U/L (ref 5–41)
AST SERPL-CCNC: 26 U/L
BASOPHILS # BLD: 1 % (ref 0–2)
BASOPHILS ABSOLUTE: 0.05 K/UL (ref 0–0.2)
C-REACTIVE PROTEIN: <3 MG/L (ref 0–5)
CREAT SERPL-MCNC: 0.91 MG/DL (ref 0.7–1.2)
EOSINOPHILS RELATIVE PERCENT: 3 % (ref 1–4)
GFR SERPL CREATININE-BSD FRML MDRD: >60 ML/MIN/1.73M2
HCT VFR BLD CALC: 47.2 % (ref 40.7–50.3)
HEMOGLOBIN: 16.1 G/DL (ref 13–17)
IMMATURE GRANULOCYTES: 1 %
LYMPHOCYTES # BLD: 21 % (ref 24–43)
MCH RBC QN AUTO: 28.1 PG (ref 25.2–33.5)
MCHC RBC AUTO-ENTMCNC: 34.1 G/DL (ref 25.2–33.5)
MCV RBC AUTO: 82.4 FL (ref 82.6–102.9)
MONOCYTES # BLD: 8 % (ref 3–12)
NRBC AUTOMATED: 0 PER 100 WBC
PDW BLD-RTO: 13.6 % (ref 11.8–14.4)
PLATELET # BLD: 162 K/UL (ref 138–453)
PMV BLD AUTO: 10.3 FL (ref 8.1–13.5)
RBC # BLD: 5.73 M/UL (ref 4.21–5.77)
RBC # BLD: ABNORMAL 10*6/UL
SEDIMENTATION RATE, ERYTHROCYTE: 7 MM/HR (ref 0–20)
SEG NEUTROPHILS: 66 % (ref 36–65)
SEGMENTED NEUTROPHILS ABSOLUTE COUNT: 3.81 K/UL (ref 1.5–8.1)
WBC # BLD: 5.7 K/UL (ref 3.5–11.3)

## 2022-10-29 PROCEDURE — 36415 COLL VENOUS BLD VENIPUNCTURE: CPT

## 2022-10-29 PROCEDURE — 84450 TRANSFERASE (AST) (SGOT): CPT

## 2022-10-29 PROCEDURE — 84460 ALANINE AMINO (ALT) (SGPT): CPT

## 2022-10-29 PROCEDURE — 85025 COMPLETE CBC W/AUTO DIFF WBC: CPT

## 2022-10-29 PROCEDURE — 82565 ASSAY OF CREATININE: CPT

## 2022-10-29 PROCEDURE — 85652 RBC SED RATE AUTOMATED: CPT

## 2022-10-29 PROCEDURE — 84075 ASSAY ALKALINE PHOSPHATASE: CPT

## 2022-10-29 PROCEDURE — 86140 C-REACTIVE PROTEIN: CPT

## 2022-10-29 PROCEDURE — 82040 ASSAY OF SERUM ALBUMIN: CPT

## 2022-10-30 DIAGNOSIS — F32.0 CURRENT MILD EPISODE OF MAJOR DEPRESSIVE DISORDER, UNSPECIFIED WHETHER RECURRENT (HCC): ICD-10-CM

## 2022-10-31 RX ORDER — BUPROPION HYDROCHLORIDE 150 MG/1
TABLET ORAL
Qty: 90 TABLET | Refills: 1 | Status: SHIPPED | OUTPATIENT
Start: 2022-10-31

## 2022-10-31 NOTE — TELEPHONE ENCOUNTER
Emery Russell called requesting a refill of the below medication which has been pended for you:     Requested Prescriptions     Pending Prescriptions Disp Refills    buPROPion (WELLBUTRIN XL) 150 MG extended release tablet [Pharmacy Med Name: buPROPion HCl ER (XL) 150 MG Oral Tablet Extended Release 24 Hour] 90 tablet 0     Sig: TAKE 1 TABLET BY MOUTH ONCE DAILY IN THE MORNING       Last Appointment Date: 9/16/2022  Next Appointment Date: 3/16/2023    Allergies   Allergen Reactions    Gabapentin      GI side effects    Seasonal Other (See Comments)     RUNNY NOSE, WATERY EYES

## 2022-11-16 ENCOUNTER — OFFICE VISIT (OUTPATIENT)
Dept: PODIATRY | Age: 62
End: 2022-11-16
Payer: COMMERCIAL

## 2022-11-16 VITALS
DIASTOLIC BLOOD PRESSURE: 80 MMHG | BODY MASS INDEX: 40.31 KG/M2 | SYSTOLIC BLOOD PRESSURE: 138 MMHG | HEART RATE: 84 BPM | RESPIRATION RATE: 20 BRPM | WEIGHT: 289 LBS

## 2022-11-16 DIAGNOSIS — E11.51 CONTROLLED TYPE 2 DM WITH PERIPHERAL CIRCULATORY DISORDER (HCC): Primary | ICD-10-CM

## 2022-11-16 DIAGNOSIS — B35.1 DERMATOPHYTOSIS OF NAIL: ICD-10-CM

## 2022-11-16 PROCEDURE — 99999 PR OFFICE/OUTPT VISIT,PROCEDURE ONLY: CPT | Performed by: PODIATRIST

## 2022-11-16 PROCEDURE — 11720 DEBRIDE NAIL 1-5: CPT | Performed by: PODIATRIST

## 2022-11-16 RX ORDER — SULFASALAZINE 500 MG/1
TABLET, DELAYED RELEASE ORAL
COMMUNITY
Start: 2022-10-21

## 2022-11-16 NOTE — PROGRESS NOTES
Subjective:  Jen Fung is a 63 y.o. male who presents to the office today for routine DM foot care. DM control improving. Currently denies F/C/N/V. Allergies   Allergen Reactions    Gabapentin      GI side effects    Seasonal Other (See Comments)     RUNNY NOSE, WATERY EYES       Past Medical History:   Diagnosis Date    Antiphospholipid syndrome ([facility]     Cervical disc disease     Depression     Diabetes mellitus ([facility]     PREDIABETIC    Erectile dysfunction     Hepatitis C     tx 6mon completed 8/913 Dr Torrie Quiroga TREATMENT    History of gastroesophageal reflux (GERD)     Hypertension     Hypotestosteronism     Impaired fasting glucose     Laceration 05/06/2017    TOP OF HEAD    Neck pain     Obesity     Sleep apnea     CPAP MACHINE    Substance abuse in remission ([facility]     IV DRUG USER 30 YEARS AGO    Substance abuse in remission ([facility]     ALCOHOLIC 20 YEARS    Testicular hypofunction 2015    Wears glasses        Prior to Admission medications    Medication Sig Start Date End Date Taking?  Authorizing Provider   sulfaSALAzine (AZULFIDINE) 500 MG EC tablet TAKE 1 TABLET DAILY FOR 30 DAYS, THEN TAKE 1 TABLET BY MOUTH TWICE DAILY 10/21/22  Yes Historical Provider, MD   buPROPion (WELLBUTRIN XL) 150 MG extended release tablet TAKE 1 TABLET BY MOUTH ONCE DAILY IN THE MORNING 10/31/22  Yes SHAKIRA De Santiago CNP   Testosterone 1.62 % GEL APPLY 4 PUMP ACTUATIONS TOPICALLY ONCE DAILY 10/17/22  Yes SHAKIRA De Santiago CNP   atorvastatin (LIPITOR) 40 MG tablet Take 1 tablet by mouth daily 10/5/22  Yes SHAKIRA De Santiago CNP   sildenafil (VIAGRA) 50 MG tablet TAKE 1 TABLET BY MOUTH AS NEEDED FOR  ERECTILE DYSFUNCTION 9/16/22  Yes SHAKIRA De Santiago CNP   atorvastatin (LIPITOR) 20 MG tablet Take 1 tablet by mouth once daily 9/12/22  Yes SHAKIRA Smyth CNP   naproxen (NAPROSYN) 500 MG tablet TAKE 1 TABLET BY MOUTH EVERY 12 HOURS AS NEEDED 22  Yes Historical Provider, MD   lisinopril-hydroCHLOROthiazide (PRINZIDE;ZESTORETIC) 20-25 MG per tablet Take 1 tablet by mouth once daily 6/10/22  Yes CHARLOTTE Gan   metFORMIN (GLUCOPHAGE) 500 MG tablet TAKE 2 TABLETS BY MOUTH ONCE DAILY WITH BREAKFAST 6/10/22  Yes Queta Reddy [address]   hydroxychloroquine (PLAQUENIL) 200 MG tablet TAKE 2 TABLETS BY MOUTH ONCE DAILY 21  Yes Historical Provider, MD   therapeutic multivitamin-minerals (THERAGRAN-M) tablet Take 1 tablet by mouth daily. Yes Historical Provider, MD   XARELTO 20 MG TABS tablet Take 1 tablet by mouth once daily with breakfast  Patient not taking: Reported on 2022 3/15/22   Celina Riddle, APRN - CNP       Past Surgical History:   Procedure Laterality Date    CERVICAL FUSION  2017    C5-7    CERVICAL FUSION N/A 2017     ANTERIOR CERVICAL DECOMPRESSION FUSION C5-C7 performed by Marcia Mercado MD at [place] [address]  2014    polyps    COLONOSCOPY  2015    sigmoid diverticulosis    COLONOSCOPY N/A 2021    COLONOSCOPY WITH BIOPSY performed by Gema Galvez MD at [address] [facility] [place] Left     2ND  AND 3RD TOE  [facility]    LIVER BIOPSY      TYMPANIC MEMBRANE REPAIR      MULTIPLE AS A CHILD       Family History   Problem Relation Age of Onset    High Blood Pressure Mother     Diabetes Mother     Cataracts Mother     Cancer Father         Unknown primary.   May have been colon cancer    Stroke Father     Diabetes Sister     Other Brother         HEPATITIS C    Diabetes Brother     Glaucoma Neg Hx        Social History     Tobacco Use    Smoking status: Former     Packs/day: 1.50     Years: 9.00     Pack years: 13.50     Types: Cigarettes     Quit date: 1992     Years since quittin.2    Smokeless tobacco: Former     Quit date: 1990    Tobacco comments:     Jorden Dudley RRT 19   Substance Use Topics    Alcohol use: No     Alcohol/week: 0.0 standard drinks Comment: HISTORY OF ALCOHOL ABUSE 30 YEARS AGO quit 1989       Review of Systems: All 12 systems reviewed and pertinent positives noted above. Lower Extremity Physical Examination:     Vitals:   Vitals:    11/16/22 1600   BP: 138/80   Pulse: 84   Resp: 20     General: AAO x 3 in NAD. Vascular: DP and PT pulses palpable 2/4, bilateral.  CFT <3 seconds, bilateral.  Hair growth present to the level of the digits, bilateral.  Edema present, bilateral.  Varicosities present, bilateral. Erythema absent, bilateral. Distal Rubor absent bilateral.  Temperature within normal limits bilateral. Hyperpigmentation present bilateral. Atrophic skin yes. Neurological: Sensation intact to light touch to level of digits, bilateral.  Protective sensation intact 10/10 sites via 5.07/10g [place]-Ami Monofilament, bilateral.  negative Tinel's, bilateral.  negative Valleix sign, bilateral.  Vibratory intact bilateral.  Reflexes Decreased bilateral.  Paresthesias negative  Dysthesias negative. Sharp/dull intact bilateral.    Musculoskeletal: Muscle strength 5/5, bilateral.  Pain absent upon palpation of above mentioned hammerote. within normal limits medial longitudinal arch, Bilateral.  Ankle ROM decreased,Bilateral.  1st MPJ ROM within normal limits, Bilateral.  Dorsally contracted digits present digits , Bilateral.  negative Lachman's test.  Other foot deformities none. Integument: Warm, dry, supple, bilateral.  Open lesion absent, bilateral.  Interdigital maceration absent to web spaces bilateral.   Nails left 1  and right 1 thickened, dystrophic and crumbly, discolored with subungual debris. Fissures absent, bilateral. Hyperkeratotic tissue is absent. Asessment: Patient is a 63 y.o. male with:    Diagnosis Orders   1. Controlled type 2 DM with peripheral circulatory disorder ([facility]        2. Dermatophytosis of nail            Plan: Patient examined and evaluated.   Current condition and treatment options discussed in detail. DM foot ed and exam  All nails as mentioned above debrided in length and thickness. Patient advised OTC methods for treatment of the mycotic nails. Contact office with any questions/problems/concerns.   RTC in 3 months

## 2022-12-07 DIAGNOSIS — I10 ESSENTIAL HYPERTENSION: ICD-10-CM

## 2022-12-07 DIAGNOSIS — R73.01 IMPAIRED FASTING GLUCOSE: ICD-10-CM

## 2022-12-07 RX ORDER — LISINOPRIL AND HYDROCHLOROTHIAZIDE 25; 20 MG/1; MG/1
TABLET ORAL
Qty: 90 TABLET | Refills: 0 | Status: SHIPPED | OUTPATIENT
Start: 2022-12-07

## 2022-12-07 NOTE — TELEPHONE ENCOUNTER
Bailey Chaidez called requesting a refill of the below medication which has been pended for you:     Requested Prescriptions     Pending Prescriptions Disp Refills    lisinopril-hydroCHLOROthiazide (PRINZIDE;ZESTORETIC) 20-25 MG per tablet [Pharmacy Med Name: Lisinopril-hydroCHLOROthiazide 20-25 MG Oral Tablet] 90 tablet 0     Sig: Take 1 tablet by mouth once daily       Last Appointment Date: 9/16/2022  Next Appointment Date: 3/16/2023    Allergies   Allergen Reactions    Gabapentin      GI side effects    Seasonal Other (See Comments)     RUNNY NOSE, WATERY EYES

## 2023-03-16 ENCOUNTER — TELEPHONE (OUTPATIENT)
Dept: FAMILY MEDICINE CLINIC | Age: 63
End: 2023-03-16

## 2023-03-16 NOTE — TELEPHONE ENCOUNTER
Attempted to reach patient regarding missed appointment on 03/16/23. I was unable to leave a vm and I am sending a letter to pt about missed appointment.

## 2023-04-18 ENCOUNTER — TELEMEDICINE (OUTPATIENT)
Dept: FAMILY MEDICINE CLINIC | Age: 63
End: 2023-04-18
Payer: COMMERCIAL

## 2023-04-18 ENCOUNTER — TELEPHONE (OUTPATIENT)
Dept: FAMILY MEDICINE CLINIC | Age: 63
End: 2023-04-18

## 2023-04-18 DIAGNOSIS — F51.05 HYPOSOMNIA, INSOMNIA OR SLEEPLESSNESS ASSOCIATED WITH ANXIETY: ICD-10-CM

## 2023-04-18 DIAGNOSIS — F41.9 HYPOSOMNIA, INSOMNIA OR SLEEPLESSNESS ASSOCIATED WITH ANXIETY: ICD-10-CM

## 2023-04-18 DIAGNOSIS — E34.9 HYPOTESTOSTERONISM: ICD-10-CM

## 2023-04-18 DIAGNOSIS — E11.9 TYPE 2 DIABETES MELLITUS WITHOUT COMPLICATION, WITHOUT LONG-TERM CURRENT USE OF INSULIN (HCC): Primary | ICD-10-CM

## 2023-04-18 PROCEDURE — 98967 PH1 ASSMT&MGMT NQHP 11-20: CPT | Performed by: NURSE PRACTITIONER

## 2023-04-18 RX ORDER — TESTOSTERONE 20.25 MG/1.25G
GEL TOPICAL
Qty: 150 G | Refills: 3 | Status: SHIPPED | OUTPATIENT
Start: 2023-04-18 | End: 2023-04-18 | Stop reason: CLARIF

## 2023-04-18 RX ORDER — PRAZOSIN HYDROCHLORIDE 2 MG/1
2 CAPSULE ORAL NIGHTLY
Qty: 30 CAPSULE | Refills: 3 | Status: SHIPPED | OUTPATIENT
Start: 2023-04-18

## 2023-04-18 RX ORDER — TESTOSTERONE 20.25 MG/1.25G
GEL TOPICAL
Qty: 150 G | Refills: 3 | Status: SHIPPED | OUTPATIENT
Start: 2023-04-18

## 2023-04-18 SDOH — ECONOMIC STABILITY: FOOD INSECURITY: WITHIN THE PAST 12 MONTHS, THE FOOD YOU BOUGHT JUST DIDN'T LAST AND YOU DIDN'T HAVE MONEY TO GET MORE.: NEVER TRUE

## 2023-04-18 SDOH — ECONOMIC STABILITY: INCOME INSECURITY: HOW HARD IS IT FOR YOU TO PAY FOR THE VERY BASICS LIKE FOOD, HOUSING, MEDICAL CARE, AND HEATING?: NOT HARD AT ALL

## 2023-04-18 SDOH — ECONOMIC STABILITY: FOOD INSECURITY: WITHIN THE PAST 12 MONTHS, YOU WORRIED THAT YOUR FOOD WOULD RUN OUT BEFORE YOU GOT MONEY TO BUY MORE.: NEVER TRUE

## 2023-04-18 SDOH — ECONOMIC STABILITY: HOUSING INSECURITY
IN THE LAST 12 MONTHS, WAS THERE A TIME WHEN YOU DID NOT HAVE A STEADY PLACE TO SLEEP OR SLEPT IN A SHELTER (INCLUDING NOW)?: NO

## 2023-04-18 ASSESSMENT — PATIENT HEALTH QUESTIONNAIRE - PHQ9
4. FEELING TIRED OR HAVING LITTLE ENERGY: 1
SUM OF ALL RESPONSES TO PHQ QUESTIONS 1-9: 3
9. THOUGHTS THAT YOU WOULD BE BETTER OFF DEAD, OR OF HURTING YOURSELF: 0
10. IF YOU CHECKED OFF ANY PROBLEMS, HOW DIFFICULT HAVE THESE PROBLEMS MADE IT FOR YOU TO DO YOUR WORK, TAKE CARE OF THINGS AT HOME, OR GET ALONG WITH OTHER PEOPLE: 0
SUM OF ALL RESPONSES TO PHQ QUESTIONS 1-9: 3
7. TROUBLE CONCENTRATING ON THINGS, SUCH AS READING THE NEWSPAPER OR WATCHING TELEVISION: 0
2. FEELING DOWN, DEPRESSED OR HOPELESS: 1
SUM OF ALL RESPONSES TO PHQ QUESTIONS 1-9: 3
6. FEELING BAD ABOUT YOURSELF - OR THAT YOU ARE A FAILURE OR HAVE LET YOURSELF OR YOUR FAMILY DOWN: 0
8. MOVING OR SPEAKING SO SLOWLY THAT OTHER PEOPLE COULD HAVE NOTICED. OR THE OPPOSITE, BEING SO FIGETY OR RESTLESS THAT YOU HAVE BEEN MOVING AROUND A LOT MORE THAN USUAL: 0
SUM OF ALL RESPONSES TO PHQ9 QUESTIONS 1 & 2: 1
1. LITTLE INTEREST OR PLEASURE IN DOING THINGS: 0
3. TROUBLE FALLING OR STAYING ASLEEP: 1
SUM OF ALL RESPONSES TO PHQ QUESTIONS 1-9: 3
5. POOR APPETITE OR OVEREATING: 0

## 2023-04-18 ASSESSMENT — ANXIETY QUESTIONNAIRES
5. BEING SO RESTLESS THAT IT IS HARD TO SIT STILL: 1
2. NOT BEING ABLE TO STOP OR CONTROL WORRYING: 1
3. WORRYING TOO MUCH ABOUT DIFFERENT THINGS: 1
4. TROUBLE RELAXING: 1
GAD7 TOTAL SCORE: 7
7. FEELING AFRAID AS IF SOMETHING AWFUL MIGHT HAPPEN: 1
1. FEELING NERVOUS, ANXIOUS, OR ON EDGE: 1
IF YOU CHECKED OFF ANY PROBLEMS ON THIS QUESTIONNAIRE, HOW DIFFICULT HAVE THESE PROBLEMS MADE IT FOR YOU TO DO YOUR WORK, TAKE CARE OF THINGS AT HOME, OR GET ALONG WITH OTHER PEOPLE: SOMEWHAT DIFFICULT
6. BECOMING EASILY ANNOYED OR IRRITABLE: 1

## 2023-04-18 NOTE — TELEPHONE ENCOUNTER
Attempted to contact pt to schedule 2 month f/u per LSS recommendations. Per Emeterio Hamlin, openings are 6/19 @ 9am or 10am, 6/20 @ 10am, or 6/22 @ 9am. No VM.

## 2023-06-28 PROBLEM — E29.1 HYPOGONADISM IN MALE: Status: ACTIVE | Noted: 2023-06-28

## 2023-07-31 DIAGNOSIS — N52.9 ERECTILE DYSFUNCTION, UNSPECIFIED ERECTILE DYSFUNCTION TYPE: ICD-10-CM

## 2023-08-01 RX ORDER — SILDENAFIL 50 MG/1
TABLET, FILM COATED ORAL
Qty: 30 TABLET | Refills: 3 | Status: SHIPPED | OUTPATIENT
Start: 2023-08-01

## 2023-08-01 NOTE — TELEPHONE ENCOUNTER
Geoff Mcdaniel called requesting a refill of the below medication which has been pended for you:     Requested Prescriptions     Pending Prescriptions Disp Refills    sildenafil (VIAGRA) 50 MG tablet [Pharmacy Med Name: Sildenafil Citrate 50mg Tablet] 30 tablet 3     Sig: Take 1 tablet by mouth as needed, up to once daily or as directed by your prescriber for erectile dysfunction.        Last Appointment Date: 4/18/2023  Next Appointment Date: Visit date not found    Allergies   Allergen Reactions    Gabapentin      GI side effects    Seasonal Other (See Comments)     RUNNY NOSE, WATERY EYES

## 2023-08-17 DIAGNOSIS — F51.05 HYPOSOMNIA, INSOMNIA OR SLEEPLESSNESS ASSOCIATED WITH ANXIETY: ICD-10-CM

## 2023-08-17 DIAGNOSIS — F41.9 HYPOSOMNIA, INSOMNIA OR SLEEPLESSNESS ASSOCIATED WITH ANXIETY: ICD-10-CM

## 2023-08-17 RX ORDER — PRAZOSIN HYDROCHLORIDE 2 MG/1
2 CAPSULE ORAL NIGHTLY
Qty: 90 CAPSULE | Refills: 0 | OUTPATIENT
Start: 2023-08-17

## 2023-08-17 NOTE — TELEPHONE ENCOUNTER
Spoke with patient about scheduling follow up appt to continue refills and he states he no longer takes the Prozosin as it did not help. He has been seeing [facility] and they have been refilling his medications.

## 2023-08-17 NOTE — TELEPHONE ENCOUNTER
Ivy Bowen called requesting a refill of the below medication which has been pended for you:     Requested Prescriptions     Pending Prescriptions Disp Refills    prazosin (MINIPRESS) 2 MG capsule [Pharmacy Med Name: Prazosin Hydrochloride 2mg Capsule] 90 capsule 0     Sig: Take 1 capsule by mouth nightly.        Last Appointment Date: 4/18/2023  Next Appointment Date: Visit date not found    Allergies   Allergen Reactions    Gabapentin      GI side effects    Seasonal Other (See Comments)     RUNNY NOSE, WATERY EYES

## 2023-10-18 ENCOUNTER — HOSPITAL ENCOUNTER (OUTPATIENT)
Age: 63
Discharge: HOME | End: 2023-10-20
Payer: COMMERCIAL

## 2023-10-18 VITALS
WEIGHT: 275 LBS | DIASTOLIC BLOOD PRESSURE: 74 MMHG | BODY MASS INDEX: 38.5 KG/M2 | HEART RATE: 64 BPM | HEIGHT: 71 IN | SYSTOLIC BLOOD PRESSURE: 146 MMHG

## 2023-10-18 DIAGNOSIS — R01.1 HEART MURMUR: ICD-10-CM

## 2023-10-18 LAB
ECHO AO ASC DIAM: 3.1 CM
ECHO AO ASCENDING AORTA INDEX: 1.29 CM/M2
ECHO AO ROOT DIAM: 3.5 CM
ECHO AO ROOT INDEX: 1.45 CM/M2
ECHO AV AREA PEAK VELOCITY: 1.3 CM2
ECHO AV AREA VTI: 1.2 CM2
ECHO AV AREA/BSA PEAK VELOCITY: 0.5 CM2/M2
ECHO AV AREA/BSA VTI: 0.5 CM2/M2
ECHO AV MEAN GRADIENT: 10 MMHG
ECHO AV MEAN VELOCITY: 1.5 M/S
ECHO AV PEAK GRADIENT: 15 MMHG
ECHO AV PEAK VELOCITY: 1.9 M/S
ECHO AV VELOCITY RATIO: 0.42
ECHO AV VTI: 48.2 CM
ECHO BSA: 2.5 M2
ECHO EST RA PRESSURE: 3 MMHG
ECHO LA AREA 4C: 15.7 CM2
ECHO LA DIAMETER INDEX: 1.41 CM/M2
ECHO LA DIAMETER: 3.4 CM
ECHO LA MAJOR AXIS: 4.7 CM
ECHO LA TO AORTIC ROOT RATIO: 0.97
ECHO LA VOL 4C: 38 ML (ref 18–58)
ECHO LA VOLUME INDEX A4C: 16 ML/M2 (ref 16–34)
ECHO LV E' LATERAL VELOCITY: 4 CM/S
ECHO LV E' SEPTAL VELOCITY: 7 CM/S
ECHO LV EDV A2C: 94 ML
ECHO LV EDV A4C: 88 ML
ECHO LV EDV INDEX A4C: 37 ML/M2
ECHO LV EDV NDEX A2C: 39 ML/M2
ECHO LV EJECTION FRACTION A2C: 54 %
ECHO LV EJECTION FRACTION A4C: 55 %
ECHO LV EJECTION FRACTION BIPLANE: 55 % (ref 55–100)
ECHO LV ESV A2C: 43 ML
ECHO LV ESV A4C: 39 ML
ECHO LV ESV INDEX A2C: 18 ML/M2
ECHO LV ESV INDEX A4C: 16 ML/M2
ECHO LV FRACTIONAL SHORTENING: 34 % (ref 28–44)
ECHO LV INTERNAL DIMENSION DIASTOLE INDEX: 2.07 CM/M2
ECHO LV INTERNAL DIMENSION DIASTOLIC: 5 CM (ref 4.2–5.9)
ECHO LV INTERNAL DIMENSION SYSTOLIC INDEX: 1.37 CM/M2
ECHO LV INTERNAL DIMENSION SYSTOLIC: 3.3 CM
ECHO LV ISOVOLUMETRIC RELAXATION TIME (IVRT): 116 MS
ECHO LV IVSD: 1.4 CM (ref 0.6–1)
ECHO LV MASS 2D: 276.4 G (ref 88–224)
ECHO LV MASS INDEX 2D: 114.7 G/M2 (ref 49–115)
ECHO LV POSTERIOR WALL DIASTOLIC: 1.3 CM (ref 0.6–1)
ECHO LV RELATIVE WALL THICKNESS RATIO: 0.52
ECHO LVOT AREA: 3.1 CM2
ECHO LVOT AV VTI INDEX: 0.37
ECHO LVOT DIAM: 2 CM
ECHO LVOT MEAN GRADIENT: 1 MMHG
ECHO LVOT PEAK GRADIENT: 3 MMHG
ECHO LVOT PEAK VELOCITY: 0.8 M/S
ECHO LVOT STROKE VOLUME INDEX: 23.3 ML/M2
ECHO LVOT SV: 56.2 ML
ECHO LVOT VTI: 17.9 CM
ECHO MV A VELOCITY: 0.76 M/S
ECHO MV E DECELERATION TIME (DT): 201 MS
ECHO MV E VELOCITY: 0.6 M/S
ECHO MV E/A RATIO: 0.79
ECHO MV E/E' LATERAL: 15
ECHO MV E/E' RATIO (AVERAGED): 11.79
ECHO MV E/E' SEPTAL: 8.57
ECHO MV MAX VELOCITY: 0.9 M/S
ECHO MV PEAK GRADIENT: 3 MMHG
ECHO PV MAX VELOCITY: 1 M/S
ECHO PV PEAK GRADIENT: 4 MMHG
ECHO RIGHT VENTRICULAR SYSTOLIC PRESSURE (RVSP): 11 MMHG
ECHO TV PEAK GRADIENT: 3 MMHG
ECHO TV REGURGITANT MAX VELOCITY: 1.4 M/S
ECHO TV REGURGITANT PEAK GRADIENT: 8 MMHG

## 2023-10-18 PROCEDURE — 93306 TTE W/DOPPLER COMPLETE: CPT | Performed by: INTERNAL MEDICINE

## 2023-10-18 PROCEDURE — 93306 TTE W/DOPPLER COMPLETE: CPT

## 2023-12-01 ENCOUNTER — OFFICE VISIT (OUTPATIENT)
Dept: PODIATRY | Age: 63
End: 2023-12-01

## 2023-12-01 VITALS
BODY MASS INDEX: 38.64 KG/M2 | HEIGHT: 71 IN | WEIGHT: 276 LBS | DIASTOLIC BLOOD PRESSURE: 86 MMHG | HEART RATE: 66 BPM | SYSTOLIC BLOOD PRESSURE: 132 MMHG

## 2023-12-01 DIAGNOSIS — L60.0 OC (ONYCHOCRYPTOSIS): Primary | ICD-10-CM

## 2023-12-01 DIAGNOSIS — L03.032 PARONYCHIA, TOE, LEFT: ICD-10-CM

## 2023-12-01 NOTE — PROGRESS NOTES
tablet Take 1 tablet by mouth once daily 12/7/22  Yes Clarice rL, APRN - [place]   sulfaSALAzine (AZULFIDINE) 500 MG EC tablet TAKE 1 TABLET DAILY FOR 30 DAYS, THEN TAKE 1 TABLET BY MOUTH TWICE DAILY 10/21/22  Yes Shu Hui MD   buPROPion (WELLBUTRIN XL) 150 MG extended release tablet TAKE 1 TABLET BY MOUTH ONCE DAILY IN THE MORNING 10/31/22  Yes SHAKIRA Griffith CNP   atorvastatin (LIPITOR) 40 MG tablet Take 1 tablet by mouth daily 10/5/22  Yes SHAKIRA Griffith CNP   naproxen (NAPROSYN) 500 MG tablet TAKE 1 TABLET BY MOUTH EVERY 12 HOURS AS NEEDED 7/20/22  Yes ProviderShu MD   hydroxychloroquine (PLAQUENIL) 200 MG tablet TAKE 2 TABLETS BY MOUTH ONCE DAILY 4/6/21  Yes ProviderShu MD   therapeutic multivitamin-minerals (THERAGRAN-M) tablet Take 1 tablet by mouth daily   Yes Provider, MD Shu     ROS: All 14 ROS systems reviewed and pertinent positives noted above, all others negative. Objective:  Patient is alert and oriented. Vascular: DP and PT pulses palpable 2/4, bilateral.  CFT <3 seconds, bilateral.  Hair growth present to the level of the digits, bilateral.  Edema absent, bilateral.  Varicosities absent, bilateral. Erythema absent, bilateral. Distal Rubor absent bilateral.  Temperature within normal limits bilateral. Hyperpigmentation absent bilateral. No atrophic skin. Neuro: Protective sensation is intact. Reflexes WNL. Musculoskeletal:  Pain present upon palpation of left, lateral, hallux. Muscle strength 5/5, Bilateral. within normal limits medial longitudinal arch, Bilateral. ROM WNL. Integument: Onychocryptosis present left, lateral, hallux. Granuloma is absent left. Paronychia changes with drainage and crust are present left, lateral, hallux. Skin color, texture, turgor normal. No rashes or lesions. .    Assessment:   Diagnosis Orders   1. OC (onychocryptosis)        2.  Paronychia, toe, left [place]

## 2023-12-10 DIAGNOSIS — E34.9 HYPOTESTOSTERONISM: ICD-10-CM

## 2023-12-11 RX ORDER — TESTOSTERONE 20.25 MG/1.25G
GEL TOPICAL
Qty: 150 G | Refills: 2 | OUTPATIENT
Start: 2023-12-11

## 2023-12-12 DIAGNOSIS — E34.9 HYPOTESTOSTERONISM: ICD-10-CM

## 2023-12-12 RX ORDER — TESTOSTERONE 20.25 MG/1.25G
GEL TOPICAL
Qty: 150 G | Refills: 3 | OUTPATIENT
Start: 2023-12-12

## 2023-12-12 NOTE — TELEPHONE ENCOUNTER
Rochelle Joseph [facility] Clinical Staff  Subject: Refill Request     QUESTIONS   Name of Medication? Testosterone 1.62 % GEL   Patient-reported dosage and instructions? 2 bottles a month   How many days do you have left? 0   Preferred Pharmacy? [facility]   Pharmacy phone number (if available)? [phone]   Additional Information for Provider? per patient insurance rejected prior   request due to meeting limit, patient is requesting for it to be sent to   [facility] bypassing the insurance.   ---------------------------------------------------------------------------   --------------   CALL BACK INFO   What is the best way for the office to contact you? OK to leave message on   voicemail   Preferred Call Back Phone Number? [phone]   ---------------------------------------------------------------------------   --------------   SCRIPT ANSWERS   Relationship to Patient? Self

## 2024-02-15 ENCOUNTER — HOSPITAL ENCOUNTER (EMERGENCY)
Age: 64
Discharge: HOME | End: 2024-02-15
Attending: EMERGENCY MEDICINE
Payer: COMMERCIAL

## 2024-02-15 ENCOUNTER — APPOINTMENT (OUTPATIENT)
Dept: GENERAL RADIOLOGY | Age: 64
End: 2024-02-15
Payer: COMMERCIAL

## 2024-02-15 VITALS
OXYGEN SATURATION: 95 % | WEIGHT: 290 LBS | SYSTOLIC BLOOD PRESSURE: 130 MMHG | RESPIRATION RATE: 14 BRPM | DIASTOLIC BLOOD PRESSURE: 73 MMHG | TEMPERATURE: 98.6 F | BODY MASS INDEX: 40.45 KG/M2 | HEART RATE: 71 BPM

## 2024-02-15 DIAGNOSIS — R07.9 CHEST PAIN, UNSPECIFIED TYPE: Primary | ICD-10-CM

## 2024-02-15 LAB
ANION GAP SERPL CALCULATED.3IONS-SCNC: 9 MMOL/L (ref 9–17)
BASOPHILS # BLD: 0.03 K/UL (ref 0–0.2)
BASOPHILS NFR BLD: 1 % (ref 0–2)
BUN SERPL-MCNC: 21 MG/DL (ref 8–23)
BUN/CREAT SERPL: 23 (ref 9–20)
CALCIUM SERPL-MCNC: 9.3 MG/DL (ref 8.6–10.4)
CHLORIDE SERPL-SCNC: 100 MMOL/L (ref 98–107)
CO2 SERPL-SCNC: 27 MMOL/L (ref 20–31)
CREAT SERPL-MCNC: 0.9 MG/DL (ref 0.7–1.2)
EOSINOPHIL # BLD: 0.18 K/UL (ref 0–0.44)
EOSINOPHILS RELATIVE PERCENT: 3 % (ref 1–4)
ERYTHROCYTE [DISTWIDTH] IN BLOOD BY AUTOMATED COUNT: 12.6 % (ref 11.8–14.4)
GFR SERPL CREATININE-BSD FRML MDRD: >60 ML/MIN/1.73M2
GLUCOSE SERPL-MCNC: 150 MG/DL (ref 70–99)
HCT VFR BLD AUTO: 41.6 % (ref 40.7–50.3)
HGB BLD-MCNC: 14.3 G/DL (ref 13–17)
IMM GRANULOCYTES # BLD AUTO: 0.03 K/UL (ref 0–0.3)
IMM GRANULOCYTES NFR BLD: 1 %
LYMPHOCYTES NFR BLD: 1.81 K/UL (ref 1.1–3.7)
LYMPHOCYTES RELATIVE PERCENT: 31 % (ref 24–43)
MCH RBC QN AUTO: 29.5 PG (ref 25.2–33.5)
MCHC RBC AUTO-ENTMCNC: 34.4 G/DL (ref 25.2–33.5)
MCV RBC AUTO: 85.8 FL (ref 82.6–102.9)
MONOCYTES NFR BLD: 0.57 K/UL (ref 0.1–1.2)
MONOCYTES NFR BLD: 10 % (ref 3–12)
NEUTROPHILS NFR BLD: 54 % (ref 36–65)
NEUTS SEG NFR BLD: 3.31 K/UL (ref 1.5–8.1)
NRBC BLD-RTO: 0 PER 100 WBC
PLATELET # BLD AUTO: 198 K/UL (ref 138–453)
PMV BLD AUTO: 8.9 FL (ref 8.1–13.5)
POTASSIUM SERPL-SCNC: 3.8 MMOL/L (ref 3.7–5.3)
RBC # BLD AUTO: 4.85 M/UL (ref 4.21–5.77)
SODIUM SERPL-SCNC: 136 MMOL/L (ref 135–144)
TROPONIN I SERPL HS-MCNC: 11 NG/L (ref 0–22)
WBC OTHER # BLD: 5.9 K/UL (ref 3.5–11.3)

## 2024-02-15 PROCEDURE — 84484 ASSAY OF TROPONIN QUANT: CPT

## 2024-02-15 PROCEDURE — 6360000002 HC RX W HCPCS: Performed by: EMERGENCY MEDICINE

## 2024-02-15 PROCEDURE — 99284 EMERGENCY DEPT VISIT MOD MDM: CPT

## 2024-02-15 PROCEDURE — 85025 COMPLETE CBC W/AUTO DIFF WBC: CPT

## 2024-02-15 PROCEDURE — 96374 THER/PROPH/DIAG INJ IV PUSH: CPT

## 2024-02-15 PROCEDURE — 71045 X-RAY EXAM CHEST 1 VIEW: CPT

## 2024-02-15 PROCEDURE — 80048 BASIC METABOLIC PNL TOTAL CA: CPT

## 2024-02-15 RX ORDER — LISINOPRIL 2.5 MG/1
2.5 TABLET ORAL DAILY
COMMUNITY

## 2024-02-15 RX ORDER — MORPHINE SULFATE 4 MG/ML
4 INJECTION, SOLUTION INTRAMUSCULAR; INTRAVENOUS ONCE
Status: COMPLETED | OUTPATIENT
Start: 2024-02-15 | End: 2024-02-15

## 2024-02-15 RX ADMIN — MORPHINE SULFATE 4 MG: 4 INJECTION, SOLUTION INTRAMUSCULAR; INTRAVENOUS at 21:29

## 2024-02-16 NOTE — ED NOTES
Pt ambulatory to room with c/o mild chest pain for two days.Pt states that pain is midsternal, radiates from chest to mid back. Pt rates pain 2/10 at this time. Pt denies sob, n/v/d, diaphoresis and all other symptoms. Pt is alert and oriented x 4 with NAD noted. Pt placed on monitor. Call light within reach.

## 2024-02-16 NOTE — ED PROVIDER NOTES
eMERGENCY dEPARTMENT eNCOUnter      Pt Name: Romeo Loya  MRN: [id number]  Birthdate 1960  Date of evaluation: 2/15/2024      CHIEF COMPLAINT       Chief Complaint   Patient presents with    Chest Pain     Dull pain all day today and yesterday in chest and mid back, radiates back and forth         HISTORY OF PRESENT ILLNESS    Romeo Loya is a 63 y.o. male who presents with chest pain.  Patient states he started a little bit yesterday with some discomfort but today earlier today has been having this dull ache across his chest into his back no other associated symptoms no exacerbating relieving factors described as somewhat mild        REVIEW OF SYSTEMS       Review of systems are all reviewed and negative except stated above in HPI    PAST MEDICAL HISTORY    has a past medical history of Antiphospholipid syndrome (HCC), Cervical disc disease, Depression, Diabetes mellitus (HCC), Erectile dysfunction, Hepatitis C, History of gastroesophageal reflux (GERD), Hypertension, Hypotestosteronism, Impaired fasting glucose, Laceration, Neck pain, Obesity, Sleep apnea, Substance abuse in remission (HCC), Substance abuse in remission (HCC), Testicular hypofunction, and Wears glasses.    SURGICAL HISTORY      has a past surgical history that includes liver biopsy; tympanic membrane repair; Hammer toe surgery (Left); Colonoscopy (06/18/2014); Colonoscopy (9/2015); cervical fusion (05/09/2017); cervical fusion (N/A, 5/9/2017); and Colonoscopy (N/A, 12/22/2021).    CURRENT MEDICATIONS       Discharge Medication List as of 2/15/2024 10:33 PM        CONTINUE these medications which have NOT CHANGED    Details   lisinopril (PRINIVIL;ZESTRIL) 2.5 MG tablet Take 1 tablet by mouth dailyHistorical Med      silver sulfADIAZINE (SILVADENE) 1 % cream Apply topically daily., Disp-30 g, R-1, Normal      sildenafil (VIAGRA) 50 MG tablet Take 1 tablet by mouth as needed, up to once daily or as directed by your prescriber for erectile 
18-Nov-2021 21:30

## 2024-04-26 ENCOUNTER — HOSPITAL ENCOUNTER (OUTPATIENT)
Dept: INTERVENTIONAL RADIOLOGY/VASCULAR | Age: 64
Discharge: HOME | End: 2024-04-26
Payer: COMMERCIAL

## 2024-04-26 DIAGNOSIS — M79.661 RIGHT CALF PAIN: ICD-10-CM

## 2024-04-26 PROCEDURE — 93971 EXTREMITY STUDY: CPT

## 2024-07-10 DIAGNOSIS — N52.9 ERECTILE DYSFUNCTION, UNSPECIFIED ERECTILE DYSFUNCTION TYPE: ICD-10-CM

## 2024-07-11 RX ORDER — SILDENAFIL 50 MG/1
TABLET, FILM COATED ORAL
Qty: 30 TABLET | Refills: 2 | OUTPATIENT
Start: 2024-07-11

## 2024-08-20 ENCOUNTER — OFFICE VISIT (OUTPATIENT)
Dept: PRIMARY CARE CLINIC | Age: 64
End: 2024-08-20
Payer: COMMERCIAL

## 2024-08-20 VITALS
OXYGEN SATURATION: 96 % | DIASTOLIC BLOOD PRESSURE: 66 MMHG | WEIGHT: 263 LBS | HEIGHT: 71 IN | BODY MASS INDEX: 36.82 KG/M2 | RESPIRATION RATE: 24 BRPM | SYSTOLIC BLOOD PRESSURE: 122 MMHG | TEMPERATURE: 99.3 F | HEART RATE: 78 BPM

## 2024-08-20 DIAGNOSIS — U07.1 COVID-19: Primary | ICD-10-CM

## 2024-08-20 DIAGNOSIS — R52 BODY ACHES: ICD-10-CM

## 2024-08-20 LAB
Lab: ABNORMAL
QC PASS/FAIL: ABNORMAL
SARS-COV-2 RDRP RESP QL NAA+PROBE: POSITIVE

## 2024-08-20 PROCEDURE — 3074F SYST BP LT 130 MM HG: CPT

## 2024-08-20 PROCEDURE — 87635 SARS-COV-2 COVID-19 AMP PRB: CPT

## 2024-08-20 PROCEDURE — 3078F DIAST BP <80 MM HG: CPT

## 2024-08-20 PROCEDURE — 99213 OFFICE O/P EST LOW 20 MIN: CPT

## 2024-08-20 RX ORDER — ATORVASTATIN CALCIUM 80 MG/1
80 TABLET, FILM COATED ORAL
COMMUNITY
Start: 2024-06-18 | End: 2024-08-20 | Stop reason: ALTCHOICE

## 2024-08-20 RX ORDER — DAPAGLIFLOZIN 10 MG/1
10 TABLET, FILM COATED ORAL DAILY
COMMUNITY
Start: 2024-05-16

## 2024-08-20 RX ORDER — NAPROXEN 375 MG/1
TABLET, DELAYED RELEASE ORAL
COMMUNITY
Start: 2024-05-30

## 2024-08-20 SDOH — ECONOMIC STABILITY: FOOD INSECURITY: WITHIN THE PAST 12 MONTHS, YOU WORRIED THAT YOUR FOOD WOULD RUN OUT BEFORE YOU GOT MONEY TO BUY MORE.: NEVER TRUE

## 2024-08-20 SDOH — ECONOMIC STABILITY: FOOD INSECURITY: WITHIN THE PAST 12 MONTHS, THE FOOD YOU BOUGHT JUST DIDN'T LAST AND YOU DIDN'T HAVE MONEY TO GET MORE.: NEVER TRUE

## 2024-08-20 SDOH — ECONOMIC STABILITY: INCOME INSECURITY: HOW HARD IS IT FOR YOU TO PAY FOR THE VERY BASICS LIKE FOOD, HOUSING, MEDICAL CARE, AND HEATING?: NOT HARD AT ALL

## 2024-08-20 ASSESSMENT — ENCOUNTER SYMPTOMS
RHINORRHEA: 1
SORE THROAT: 1
NAUSEA: 0
SINUS PAIN: 1
COUGH: 1
DIARRHEA: 0
VOMITING: 0
SINUS PRESSURE: 1
GASTROINTESTINAL NEGATIVE: 1
EYES NEGATIVE: 1
ALLERGIC/IMMUNOLOGIC NEGATIVE: 1
CONSTIPATION: 0

## 2024-08-20 ASSESSMENT — PATIENT HEALTH QUESTIONNAIRE - PHQ9: DEPRESSION UNABLE TO ASSESS: PT REFUSES

## 2024-08-20 NOTE — PROGRESS NOTES
formerly [facility], [facility], [place], [facility] by [facility]  [address]  [facility] [address]  Dept: [phone]  Dept Fax: [phone]    Romeo Loya  is a 63 y.o. male who presents today for his medical conditions/complaints as noted below.  Romeo Loya is c/o of   Chief Complaint   Patient presents with    URI     Pt ill since Sunday with sore throat, headache, and body aches with cough.        HPI:     URI   This is a new problem. The current episode started in the past 7 days (sx began Sunday evening with MALLOY and BA). The problem has been gradually worsening. The maximum temperature recorded prior to his arrival was 100.4 - 100.9 F. Associated symptoms include congestion, coughing, headaches, rhinorrhea, sinus pain and a sore throat. Pertinent negatives include no diarrhea, nausea or vomiting. He has tried acetaminophen (nyquil) for the symptoms. The treatment provided mild relief.         Past Medical History:   Diagnosis Date    Antiphospholipid syndrome (HCC)     Cervical disc disease     Depression     Diabetes mellitus (HCC)     PREDIABETIC    Erectile dysfunction     Hepatitis C     tx 6mon completed 8/913 Dr Vincent CURED WITH TREATMENT    History of gastroesophageal reflux (GERD)     Hypertension     Hypotestosteronism     Impaired fasting glucose     Laceration 05/06/2017    TOP OF HEAD    Neck pain     Obesity     Sleep apnea     CPAP MACHINE    Substance abuse in remission (HCC)     IV DRUG USER 30 YEARS AGO    Substance abuse in remission (HCC)     ALCOHOLIC 20 YEARS    Testicular hypofunction 2015    Wears glasses      Past Surgical History:   Procedure Laterality Date    CERVICAL FUSION  05/09/2017    C5-7    CERVICAL FUSION N/A 5/9/2017     ANTERIOR CERVICAL DECOMPRESSION FUSION C5-C7 performed by Feliciano Way MD at [facility] OR    COLONOSCOPY  06/18/2014    polyps    COLONOSCOPY  9/2015    sigmoid diverticulosis

## 2024-12-11 ENCOUNTER — HOSPITAL ENCOUNTER (EMERGENCY)
Age: 64
Discharge: HOME | End: 2024-12-11
Attending: EMERGENCY MEDICINE
Payer: COMMERCIAL

## 2024-12-11 ENCOUNTER — APPOINTMENT (OUTPATIENT)
Dept: CT IMAGING | Age: 64
End: 2024-12-11
Payer: COMMERCIAL

## 2024-12-11 ENCOUNTER — APPOINTMENT (OUTPATIENT)
Dept: INTERVENTIONAL RADIOLOGY/VASCULAR | Age: 64
End: 2024-12-11
Attending: EMERGENCY MEDICINE
Payer: COMMERCIAL

## 2024-12-11 VITALS
TEMPERATURE: 98 F | DIASTOLIC BLOOD PRESSURE: 90 MMHG | HEART RATE: 66 BPM | RESPIRATION RATE: 18 BRPM | OXYGEN SATURATION: 97 % | SYSTOLIC BLOOD PRESSURE: 133 MMHG

## 2024-12-11 DIAGNOSIS — R10.9 RIGHT FLANK PAIN: Primary | ICD-10-CM

## 2024-12-11 LAB
ALBUMIN SERPL-MCNC: 4 G/DL (ref 3.5–5.2)
ALBUMIN/GLOB SERPL: 1.3 {RATIO} (ref 1–2.5)
ALP SERPL-CCNC: 89 U/L (ref 40–129)
ALT SERPL-CCNC: 48 U/L (ref 5–41)
AMPHET UR QL SCN: NEGATIVE
ANION GAP SERPL CALCULATED.3IONS-SCNC: 12 MMOL/L (ref 9–17)
AST SERPL-CCNC: 34 U/L
BARBITURATES UR QL SCN: NEGATIVE
BASOPHILS # BLD: 0.04 K/UL (ref 0–0.2)
BASOPHILS NFR BLD: 1 % (ref 0–2)
BENZODIAZ UR QL: NEGATIVE
BILIRUB SERPL-MCNC: 0.5 MG/DL (ref 0.3–1.2)
BILIRUB UR QL STRIP: NEGATIVE
BUN SERPL-MCNC: 14 MG/DL (ref 8–23)
BUN/CREAT SERPL: 18 (ref 9–20)
CALCIUM SERPL-MCNC: 9.1 MG/DL (ref 8.6–10.4)
CANNABINOIDS UR QL SCN: NEGATIVE
CHLORIDE SERPL-SCNC: 100 MMOL/L (ref 98–107)
CLARITY UR: CLEAR
CO2 SERPL-SCNC: 26 MMOL/L (ref 20–31)
COCAINE UR QL SCN: NEGATIVE
COLOR UR: YELLOW
COMMENT: ABNORMAL
CREAT SERPL-MCNC: 0.8 MG/DL (ref 0.7–1.2)
EOSINOPHIL # BLD: 0.15 K/UL (ref 0–0.44)
EOSINOPHILS RELATIVE PERCENT: 3 % (ref 1–4)
ERYTHROCYTE [DISTWIDTH] IN BLOOD BY AUTOMATED COUNT: 14.8 % (ref 11.8–14.4)
FENTANYL UR QL: NEGATIVE
GFR, ESTIMATED: >90 ML/MIN/1.73M2
GLUCOSE SERPL-MCNC: 180 MG/DL (ref 70–99)
GLUCOSE UR STRIP-MCNC: ABNORMAL MG/DL
HCT VFR BLD AUTO: 52.2 % (ref 40.7–50.3)
HGB BLD-MCNC: 17.5 G/DL (ref 13–17)
HGB UR QL STRIP.AUTO: NEGATIVE
IMM GRANULOCYTES # BLD AUTO: 0.03 K/UL (ref 0–0.3)
IMM GRANULOCYTES NFR BLD: 1 %
KETONES UR STRIP-MCNC: NEGATIVE MG/DL
LEUKOCYTE ESTERASE UR QL STRIP: NEGATIVE
LIPASE SERPL-CCNC: 42 U/L (ref 13–60)
LYMPHOCYTES NFR BLD: 1.32 K/UL (ref 1.1–3.7)
LYMPHOCYTES RELATIVE PERCENT: 23 % (ref 24–43)
MCH RBC QN AUTO: 28.5 PG (ref 25.2–33.5)
MCHC RBC AUTO-ENTMCNC: 33.5 G/DL (ref 25.2–33.5)
MCV RBC AUTO: 85 FL (ref 82.6–102.9)
METHADONE UR QL: NEGATIVE
MONOCYTES NFR BLD: 0.49 K/UL (ref 0.1–1.2)
MONOCYTES NFR BLD: 9 % (ref 3–12)
NEUTROPHILS NFR BLD: 64 % (ref 36–65)
NEUTS SEG NFR BLD: 3.62 K/UL (ref 1.5–8.1)
NITRITE UR QL STRIP: NEGATIVE
NRBC BLD-RTO: 0 PER 100 WBC
OPIATES UR QL SCN: NEGATIVE
OXYCODONE UR QL SCN: NEGATIVE
PCP UR QL SCN: NEGATIVE
PH UR STRIP: 5.5 [PH] (ref 5–6)
PLATELET # BLD AUTO: 194 K/UL (ref 138–453)
PMV BLD AUTO: 8.8 FL (ref 8.1–13.5)
POTASSIUM SERPL-SCNC: 3.7 MMOL/L (ref 3.7–5.3)
PROT SERPL-MCNC: 7 G/DL (ref 6.4–8.3)
PROT UR STRIP-MCNC: NEGATIVE MG/DL
RBC # BLD AUTO: 6.14 M/UL (ref 4.21–5.77)
RBC # BLD: ABNORMAL 10*6/UL
SODIUM SERPL-SCNC: 138 MMOL/L (ref 135–144)
SP GR UR STRIP: 1.02 (ref 1.01–1.02)
TEST INFORMATION: NORMAL
UROBILINOGEN UR STRIP-ACNC: NORMAL EU/DL (ref 0–1)
WBC OTHER # BLD: 5.7 K/UL (ref 3.5–11.3)

## 2024-12-11 PROCEDURE — 74176 CT ABD & PELVIS W/O CONTRAST: CPT

## 2024-12-11 PROCEDURE — 81003 URINALYSIS AUTO W/O SCOPE: CPT

## 2024-12-11 PROCEDURE — 96372 THER/PROPH/DIAG INJ SC/IM: CPT

## 2024-12-11 PROCEDURE — 80053 COMPREHEN METABOLIC PANEL: CPT

## 2024-12-11 PROCEDURE — 76870 US EXAM SCROTUM: CPT

## 2024-12-11 PROCEDURE — 99284 EMERGENCY DEPT VISIT MOD MDM: CPT

## 2024-12-11 PROCEDURE — 83690 ASSAY OF LIPASE: CPT

## 2024-12-11 PROCEDURE — 6360000002 HC RX W HCPCS: Performed by: EMERGENCY MEDICINE

## 2024-12-11 PROCEDURE — 85025 COMPLETE CBC W/AUTO DIFF WBC: CPT

## 2024-12-11 PROCEDURE — 80307 DRUG TEST PRSMV CHEM ANLYZR: CPT

## 2024-12-11 PROCEDURE — 36415 COLL VENOUS BLD VENIPUNCTURE: CPT

## 2024-12-11 RX ORDER — KETOROLAC TROMETHAMINE 30 MG/ML
15 INJECTION, SOLUTION INTRAMUSCULAR; INTRAVENOUS ONCE
Status: COMPLETED | OUTPATIENT
Start: 2024-12-11 | End: 2024-12-11

## 2024-12-11 RX ORDER — ORPHENADRINE CITRATE 30 MG/ML
60 INJECTION INTRAMUSCULAR; INTRAVENOUS ONCE
Status: COMPLETED | OUTPATIENT
Start: 2024-12-11 | End: 2024-12-11

## 2024-12-11 RX ADMIN — KETOROLAC TROMETHAMINE 15 MG: 30 INJECTION, SOLUTION INTRAMUSCULAR at 10:55

## 2024-12-11 RX ADMIN — ORPHENADRINE CITRATE 60 MG: 60 INJECTION INTRAMUSCULAR; INTRAVENOUS at 10:57

## 2024-12-11 ASSESSMENT — PAIN SCALES - GENERAL
PAINLEVEL_OUTOF10: 7
PAINLEVEL_OUTOF10: 2
PAINLEVEL_OUTOF10: 3

## 2024-12-11 ASSESSMENT — PAIN DESCRIPTION - LOCATION
LOCATION: BACK;SCROTUM
LOCATION: BACK;BUTTOCKS;SCROTUM

## 2024-12-11 ASSESSMENT — ENCOUNTER SYMPTOMS
COUGH: 0
DIARRHEA: 0
SHORTNESS OF BREATH: 0
NAUSEA: 0
VOMITING: 0
SORE THROAT: 0
ABDOMINAL PAIN: 0
BACK PAIN: 0
WHEEZING: 0

## 2024-12-11 ASSESSMENT — PAIN - FUNCTIONAL ASSESSMENT: PAIN_FUNCTIONAL_ASSESSMENT: 0-10

## 2024-12-11 ASSESSMENT — LIFESTYLE VARIABLES
HOW MANY STANDARD DRINKS CONTAINING ALCOHOL DO YOU HAVE ON A TYPICAL DAY: PATIENT DOES NOT DRINK
HOW OFTEN DO YOU HAVE A DRINK CONTAINING ALCOHOL: NEVER

## 2024-12-11 ASSESSMENT — PAIN DESCRIPTION - ORIENTATION: ORIENTATION: RIGHT

## 2024-12-11 NOTE — DISCHARGE INSTRUCTIONS
Return to the emergency department if symptoms worsen or persist.  Follow-up with your family doctor in 1 to 2 days.  Use over-the-counter lidoderm patches as needed.

## 2024-12-11 NOTE — ED PROVIDER NOTES
[facility] Emergency Department  [address] [facility] [address]  Phone: [phone]    eMERGENCY dEPARTMENT eNCOUnter        Pt Name: Romeo Loya  MRN: [id number]  Birthdate 1960  Date of evaluation: 12/11/24    CHIEF COMPLAINT     Chief Complaint   Patient presents with    Back Pain     Worse in the night and woke him up    Testicle Pain       HISTORY OF PRESENT ILLNESS    Romeo Loya is a 64 y.o. male who presents to the emergency department with right flank pain radiating around to the right abdomen and groin and testicle on the right side, with some radiation to the right buttock region, but not down his leg. No bowel or bladder incontinence. No weakness or numbness to the lower ext. NO reash or lesions. He admits he has had this now for several months but has become more intense to the point where last night it awoke the patient from sleep and he decided to be seen and evaluated finally for this.  He has had intermittent nausea and vomiting most recently around Thanksgiving time.  He denies any fever chills cough or congestion.  No chest pain or shortness of breath.  No urinary symptoms.  No history of a kidney stone.  No recent injury trauma rash or lesions.  His testicle on the right seems to be tender to home but he denies any swelling trauma discoloration or redness.  He is urinating without any dysuria or hematuria.    REVIEW OF SYSTEMS     Review of Systems   Constitutional:  Negative for chills and fever.   HENT:  Negative for congestion, ear pain and sore throat.    Respiratory:  Negative for cough, shortness of breath and wheezing.    Cardiovascular:  Negative for chest pain, palpitations and leg swelling.   Gastrointestinal:  Negative for abdominal pain, diarrhea, nausea and vomiting.   Genitourinary:  Positive for flank pain and testicular pain. Negative for dysuria, frequency, hematuria, scrotal swelling and urgency.   Musculoskeletal:  Negative for back pain.   Skin:  Negative

## (undated) DEVICE — CODMAN® SURGICAL PATTIES 1" X 1" (2.54CM X 2.54CM): Brand: CODMAN®

## (undated) DEVICE — GLOVE ORANGE PI 7   MSG9070

## (undated) DEVICE — MERCY DEFIANCE ENDO KIT: Brand: MEDLINE INDUSTRIES, INC.

## (undated) DEVICE — Device

## (undated) DEVICE — TRAY CATHETER 16FR F INCLUDE BARDX IC COMPLT CARE DRNGE BG

## (undated) DEVICE — NEURO SPONGES: Brand: DEROYAL

## (undated) DEVICE — DRAPE C ARM UNIV W41XL74IN CLR PLAS XR VELC CLSR POLY STRP

## (undated) DEVICE — PEN: MARKING STD 100/CS: Brand: MEDICAL ACTION INDUSTRIES

## (undated) DEVICE — CODMAN® SURGICAL PATTIES 1/2" X 1/2" (1.27CM X 1.27CM): Brand: CODMAN®

## (undated) DEVICE — SUTURE MCRYL SZ 4-0 L18IN ABSRB UD L16MM PC-3 3/8 CIR PRIM Y845G

## (undated) DEVICE — BLADE ES ELASTOMERIC COAT INSUL DURABLE BEND UPTO 90DEG

## (undated) DEVICE — NEEDLE SPNL 22GA L3.5IN BLK HUB S STL REG WALL FIT STYL W/

## (undated) DEVICE — SPONGE: SPECIALTY PEANUT XR 100/CS: Brand: MEDICAL ACTION INDUSTRIES

## (undated) DEVICE — DRAPE,REIN 53X77,STERILE: Brand: MEDLINE

## (undated) DEVICE — 60 ML SYRINGE REGULAR TIP: Brand: MONOJECT

## (undated) DEVICE — ELECTRODE PT RET AD L9FT HI MOIST COND ADH HYDRGEL CORDED

## (undated) DEVICE — SUTURE VCRL SZ 2-0 L18IN ABSRB VLT L26MM SH 1/2 CIR J775D

## (undated) DEVICE — CODMAN® SURGICAL PATTIES 1/2" X 3" (1.27CM X 7.62CM): Brand: CODMAN®

## (undated) DEVICE — BLADE ES L4IN INSUL EDGE

## (undated) DEVICE — 60 ML SYRINGE LUER-LOCK TIP: Brand: MONOJECT

## (undated) DEVICE — 3M™ TEGADERM™ TRANSPARENT FILM DRESSING FRAME STYLE, 1624W, 2-3/8 IN X 2-3/4 IN (6 CM X 7 CM), 100/CT 4CT/CASE: Brand: 3M™ TEGADERM™

## (undated) DEVICE — GOWN,AURORA,NONRNF,XL,30/CS: Brand: MEDLINE

## (undated) DEVICE — NEEDLE SPNL L3.5IN PNK HUB S STL REG WALL FIT STYL W/ QNCKE

## (undated) DEVICE — OIL CARTRIDGE: Brand: CORE, MAESTRO

## (undated) DEVICE — PREP SOL PVP IODINE 4%  4 OZ/BTL

## (undated) DEVICE — SHEET, ORTHO, SPLIT, STERILE: Brand: MEDLINE

## (undated) DEVICE — PROTECTOR ULN NRV PUR FOAM HK LOOP STRP ANATOMICALLY

## (undated) DEVICE — GLOVE SURG SZ 65 THK91MIL LTX FREE SYN POLYISOPRENE

## (undated) DEVICE — CONTROL SYRINGE LUER-LOCK TIP: Brand: MONOJECT

## (undated) DEVICE — KIT EVAC 400CC DIA2.3MM PVC RELIABLE SUCT DRNGE 3 SPR RND H

## (undated) DEVICE — FORCEPS BX L240CM JAW DIA2.4MM ORNG L CAP W/ NDL DISP RAD

## (undated) DEVICE — 1200CC GUARDIAN II: Brand: GUARDIAN

## (undated) DEVICE — SUTURE NONABSORBABLE MONOFILAMENT 3-0 PS-1 18 IN BLK ETHILON 1663H

## (undated) DEVICE — COVER,MAYO STAND,STERILE: Brand: MEDLINE

## (undated) DEVICE — EMESIS BASIN: Brand: DEROYAL

## (undated) DEVICE — DRAPE MICSCP W117XL305CM DIA65MM LENS W VARI LENS2 FOR LEICA

## (undated) DEVICE — GOWN,AURORA,NONREINFORCED,LARGE: Brand: MEDLINE

## (undated) DEVICE — STERILE LATEX POWDER-FREE SURGICAL GLOVESWITH NITRILE COATING: Brand: PROTEXIS

## (undated) DEVICE — DIFFUSER: Brand: CORE, MAESTRO

## (undated) DEVICE — IMPLANTABLE DEVICE: Type: IMPLANTABLE DEVICE | Site: SPINE CERVICAL | Status: NON-FUNCTIONAL

## (undated) DEVICE — SUTURE VCRL SZ 3-0 L18IN ABSRB UD L26MM SH 1/2 CIR J864D

## (undated) DEVICE — C-ARMOR C-ARM EQUIPMENT COVERS CLEAR STERILE UNIVERSAL FIT 12 PER CASE: Brand: C-ARMOR

## (undated) DEVICE — SYRINGE BLB 2 PC STER 50

## (undated) DEVICE — GAUZE,SPONGE,FLUFF,6"X6.75",STRL,5/TRAY: Brand: MEDLINE

## (undated) DEVICE — LINE SAMP O2 6.5FT W/FEMALE CONN F/ADULT CAPNOLINE PLUS

## (undated) DEVICE — 3M™ STERI-STRIP™ COMPOUND BENZOIN TINCTURE 40 BAGS/CARTON 4 CARTONS/CASE C1544: Brand: 3M™ STERI-STRIP™

## (undated) DEVICE — SOLUTION SURG PREP POV IOD 7.5% 4 OZ

## (undated) DEVICE — 3M™ IOBAN™ 2 ANTIMICROBIAL INCISE DRAPE 6650EZ: Brand: IOBAN™ 2

## (undated) DEVICE — 3.0MM PRECISION NEURO (MATCH HEAD)

## (undated) DEVICE — CONNECTOR TBNG WHT PLAS SUCT STR 5IN1 LTWT W/ M CONN

## (undated) DEVICE — ADHESIVE SKIN CLSR 0.7ML TOP DERMBND ADV

## (undated) DEVICE — CONNECTOR TBNG AUX H2O JET DISP FOR OLY 160/180 SER

## (undated) DEVICE — CONVERTED USE 338908 SPONGES LAP 18X18 ST

## (undated) DEVICE — PACK PROCEDURE SURG LUMBAR SPINE SVMMC

## (undated) DEVICE — PAD,NON-ADHERENT,3X8,STERILE,LF,1/PK: Brand: MEDLINE